# Patient Record
Sex: FEMALE | Race: WHITE | NOT HISPANIC OR LATINO | Employment: FULL TIME | URBAN - METROPOLITAN AREA
[De-identification: names, ages, dates, MRNs, and addresses within clinical notes are randomized per-mention and may not be internally consistent; named-entity substitution may affect disease eponyms.]

---

## 2017-02-27 ENCOUNTER — ALLSCRIPTS OFFICE VISIT (OUTPATIENT)
Dept: OTHER | Facility: OTHER | Age: 52
End: 2017-02-27

## 2017-02-27 DIAGNOSIS — E78.00 PURE HYPERCHOLESTEROLEMIA: ICD-10-CM

## 2017-02-27 DIAGNOSIS — Z00.00 ENCOUNTER FOR GENERAL ADULT MEDICAL EXAMINATION WITHOUT ABNORMAL FINDINGS: ICD-10-CM

## 2017-04-06 ENCOUNTER — ALLSCRIPTS OFFICE VISIT (OUTPATIENT)
Dept: OTHER | Facility: OTHER | Age: 52
End: 2017-04-06

## 2017-04-06 DIAGNOSIS — Z84.89 FAMILY HISTORY OF OTHER SPECIFIED CONDITIONS: ICD-10-CM

## 2017-04-06 DIAGNOSIS — R53.83 OTHER FATIGUE: ICD-10-CM

## 2017-04-06 DIAGNOSIS — E78.00 PURE HYPERCHOLESTEROLEMIA: ICD-10-CM

## 2017-04-06 DIAGNOSIS — R14.0 ABDOMINAL DISTENSION (GASEOUS): ICD-10-CM

## 2017-04-14 ENCOUNTER — LAB (OUTPATIENT)
Dept: LAB | Facility: CLINIC | Age: 52
End: 2017-04-14
Payer: COMMERCIAL

## 2017-04-14 DIAGNOSIS — R53.83 OTHER FATIGUE: ICD-10-CM

## 2017-04-14 DIAGNOSIS — Z84.89 FAMILY HISTORY OF OTHER SPECIFIED CONDITIONS: ICD-10-CM

## 2017-04-14 DIAGNOSIS — E78.00 PURE HYPERCHOLESTEROLEMIA: ICD-10-CM

## 2017-04-14 LAB
ALBUMIN SERPL BCP-MCNC: 3.5 G/DL (ref 3.5–5)
ALP SERPL-CCNC: 63 U/L (ref 46–116)
ALT SERPL W P-5'-P-CCNC: 21 U/L (ref 12–78)
ANION GAP SERPL CALCULATED.3IONS-SCNC: 4 MMOL/L (ref 4–13)
AST SERPL W P-5'-P-CCNC: 14 U/L (ref 5–45)
BILIRUB SERPL-MCNC: 0.44 MG/DL (ref 0.2–1)
BUN SERPL-MCNC: 13 MG/DL (ref 5–25)
CALCIUM SERPL-MCNC: 8.4 MG/DL (ref 8.3–10.1)
CHLORIDE SERPL-SCNC: 105 MMOL/L (ref 100–108)
CHOLEST SERPL-MCNC: 229 MG/DL (ref 50–200)
CO2 SERPL-SCNC: 27 MMOL/L (ref 21–32)
CREAT SERPL-MCNC: 0.85 MG/DL (ref 0.6–1.3)
CRP SERPL QL: <3 MG/L
GFR SERPL CREATININE-BSD FRML MDRD: >60 ML/MIN/1.73SQ M
GLUCOSE P FAST SERPL-MCNC: 89 MG/DL (ref 65–99)
HDLC SERPL-MCNC: 82 MG/DL (ref 40–60)
LDLC SERPL CALC-MCNC: 138 MG/DL (ref 0–100)
POTASSIUM SERPL-SCNC: 4.4 MMOL/L (ref 3.5–5.3)
PROT SERPL-MCNC: 6.8 G/DL (ref 6.4–8.2)
SODIUM SERPL-SCNC: 136 MMOL/L (ref 136–145)
TRIGL SERPL-MCNC: 45 MG/DL
TSH SERPL DL<=0.05 MIU/L-ACNC: 3.2 UIU/ML (ref 0.36–3.74)

## 2017-04-14 PROCEDURE — 83516 IMMUNOASSAY NONANTIBODY: CPT

## 2017-04-14 PROCEDURE — 80061 LIPID PANEL: CPT

## 2017-04-14 PROCEDURE — 36415 COLL VENOUS BLD VENIPUNCTURE: CPT

## 2017-04-14 PROCEDURE — 84443 ASSAY THYROID STIM HORMONE: CPT

## 2017-04-14 PROCEDURE — 80053 COMPREHEN METABOLIC PANEL: CPT

## 2017-04-14 PROCEDURE — 86140 C-REACTIVE PROTEIN: CPT

## 2017-04-17 LAB
GLIADIN PEPTIDE IGA SER-ACNC: 5 UNITS (ref 0–19)
GLIADIN PEPTIDE IGG SER-ACNC: 2 UNITS (ref 0–19)

## 2017-04-18 ENCOUNTER — GENERIC CONVERSION - ENCOUNTER (OUTPATIENT)
Dept: OTHER | Facility: OTHER | Age: 52
End: 2017-04-18

## 2017-05-01 ENCOUNTER — ALLSCRIPTS OFFICE VISIT (OUTPATIENT)
Dept: OTHER | Facility: OTHER | Age: 52
End: 2017-05-01

## 2017-06-09 DIAGNOSIS — Z12.31 ENCOUNTER FOR SCREENING MAMMOGRAM FOR MALIGNANT NEOPLASM OF BREAST: ICD-10-CM

## 2017-07-17 ENCOUNTER — ALLSCRIPTS OFFICE VISIT (OUTPATIENT)
Dept: OTHER | Facility: OTHER | Age: 52
End: 2017-07-17

## 2017-07-18 ENCOUNTER — ALLSCRIPTS OFFICE VISIT (OUTPATIENT)
Dept: OTHER | Facility: OTHER | Age: 52
End: 2017-07-18

## 2017-07-18 ENCOUNTER — HOSPITAL ENCOUNTER (OUTPATIENT)
Dept: RADIOLOGY | Age: 52
Discharge: HOME | End: 2017-07-18
Payer: COMMERCIAL

## 2017-07-18 DIAGNOSIS — R14.0 ABDOMINAL DISTENSION (GASEOUS): ICD-10-CM

## 2017-07-18 PROCEDURE — 76705 ECHO EXAM OF ABDOMEN: CPT

## 2017-07-21 ENCOUNTER — GENERIC CONVERSION - ENCOUNTER (OUTPATIENT)
Dept: OTHER | Facility: OTHER | Age: 52
End: 2017-07-21

## 2017-08-14 DIAGNOSIS — N83.209 CYST OF OVARY: ICD-10-CM

## 2017-08-16 ENCOUNTER — HOSPITAL ENCOUNTER (OUTPATIENT)
Dept: RADIOLOGY | Age: 52
Discharge: HOME | End: 2017-08-16
Payer: COMMERCIAL

## 2017-08-16 DIAGNOSIS — N83.209 CYST OF OVARY: ICD-10-CM

## 2017-08-16 PROCEDURE — 76856 US EXAM PELVIC COMPLETE: CPT

## 2017-08-16 PROCEDURE — 76830 TRANSVAGINAL US NON-OB: CPT

## 2017-08-17 ENCOUNTER — GENERIC CONVERSION - ENCOUNTER (OUTPATIENT)
Dept: OTHER | Facility: OTHER | Age: 52
End: 2017-08-17

## 2017-09-05 ENCOUNTER — ALLSCRIPTS OFFICE VISIT (OUTPATIENT)
Dept: OTHER | Facility: OTHER | Age: 52
End: 2017-09-05

## 2017-09-08 ENCOUNTER — LAB CONVERSION - ENCOUNTER (OUTPATIENT)
Dept: OTHER | Facility: OTHER | Age: 52
End: 2017-09-08

## 2017-09-08 LAB
ADDITIONAL INFORMATION (HISTORICAL): NORMAL
ADEQUACY: (HISTORICAL): NORMAL
COMMENT (HISTORICAL): NORMAL
CYTOTECHNOLOGIST: (HISTORICAL): NORMAL
HPV MRNA E6/E7 (HISTORICAL): NOT DETECTED
INTERPRETATION (HISTORICAL): NORMAL
LMP (HISTORICAL): NORMAL
PREV. BX: (HISTORICAL): NORMAL
PREV. PAP (HISTORICAL): NORMAL
SOURCE (HISTORICAL): NORMAL

## 2017-09-14 ENCOUNTER — TRANSCRIBE ORDERS (OUTPATIENT)
Dept: ADMINISTRATIVE | Facility: HOSPITAL | Age: 52
End: 2017-09-14

## 2017-09-14 DIAGNOSIS — Z12.31 ENCOUNTER FOR MAMMOGRAM TO ESTABLISH BASELINE MAMMOGRAM: Primary | ICD-10-CM

## 2017-09-15 DIAGNOSIS — Z12.31 ENCOUNTER FOR SCREENING MAMMOGRAM FOR MALIGNANT NEOPLASM OF BREAST: ICD-10-CM

## 2017-09-20 ENCOUNTER — HOSPITAL ENCOUNTER (OUTPATIENT)
Dept: RADIOLOGY | Age: 52
Discharge: HOME | End: 2017-09-20
Payer: COMMERCIAL

## 2017-09-20 DIAGNOSIS — Z12.31 ENCOUNTER FOR SCREENING MAMMOGRAM FOR MALIGNANT NEOPLASM OF BREAST: ICD-10-CM

## 2017-09-20 PROCEDURE — 77063 BREAST TOMOSYNTHESIS BI: CPT

## 2017-09-20 PROCEDURE — G0202 SCR MAMMO BI INCL CAD: HCPCS

## 2017-10-25 NOTE — PROGRESS NOTES
Assessment  1  Irregular menses (626 4) (N92 6)    Discussion/Summary    #1  Pap smear done with co-testing as well as annual visitEncouraged self breast examination as well as calcium supplementationRecommend annual mammogramDiscussed treatment options for irregular menses including low-dose OCPs, progesterone cycling versus surgery including hysterectomy  Risks and benefits reviewed  All questions answered  She will notify me of her decisionRecommend operation report 2012Return to office in one year  The patient has the current Goals: Goals met  The patent has the current Barriers: No barriers  Patient is able to Self-Care  Self Referrals: Yes      Chief Complaint  yearly      History of Present Illness  HPI: This is a 36-year-old female  3 para 3 who presents for her annual GYN exam  She states in the course of the year her cycles have become more irregular  Beginning of the year she skipped 2 consecutive months  By July and August she began bleeding and spotting intermittently throughout the month  She gets hot flashes and night sweats  She states her mother went through menopause at age 46 area she did have an endometrial ablation done in  followed by a laparoscopic right salpingo-oophorectomy in  for endometrioma  She is up-to-date with her screening mammogram  She did have a colonoscopy in 2012 which had revealed diverticulitis  She is sexually active and has been in a monogamous relationship for 28 years  did have a pelvic ultrasound done through her primary care physician last month which had revealed a normal endometrial stripe with normal left ovary  Review of Systems    Cardiovascular: no complaints of slow or fast heart rate, no chest pain, no palpitations, no leg claudication or lower extremity edema  Respiratory: no complaints of shortness of breath, no wheezing, no dyspnea on exertion, no orthopnea or PND     Breasts: no complaints of breast pain, breast lump or nipple discharge  Gastrointestinal: no complaints of abdominal pain, no constipation, no nausea or diarrhea, no vomiting, no bloody stools  Genitourinary: as noted in HPI  Over the past 2 weeks, how often have you been bothered by the following problems? 1 ) Little interest or pleasure in doing things? Not at all    2 ) Feeling down, depressed or hopeless? Several days  3 ) Trouble falling asleep or sleeping too much? Not at all    4 ) Feeling tired or having little energy? Not at all    5 ) Poor appetite or overeating? Not at all    6 ) Feeling bad about yourself, or that you are a failure, or have let yourself or your family down? Not at all    7 ) Trouble concentrating on things, such as reading a newspaper or watching television? Not at all    8 ) Moving or speaking so slowly that other people could have noticed, or the opposite, moving or speaking faster than usual? Not at all  How difficult have these problems made it for you to do your work, take care of things at home, or get along with people? Not at all  Score 1      OB History  Pregnancy History (Brief):   Prior pregnancies: : 3  Para:   Delivery type: 3 vaginal       Active Problems  1  Abnormal blood chemistry (790 6) (R79 9)   2  Acute pharyngitis (462) (J02 9)   3  Acute URI (465 9) (J06 9)   4  Bloating (787 3) (R14 0)   5  Corneal abrasion (918 1) (S05 00XA)   6  Cyst of uterus (621 8) (N85 8)   7  Diverticulitis (562 11) (K57 92)   8  Encounter for annual routine gynecological examination (V72 31) (Z01 419)   9  Encounter for routine gynecological examination (V72 31) (Z01 419)   10  Encounter for screening mammogram for breast cancer (V76 12) (Z12 31)   11  Fatigue (780 79) (R53 83)   12  History of family problem (V61 8) (Z84 89)   13  Hypercholesteremia (272 0) (E78 00)   14  IBS (irritable bowel syndrome) (564 1) (K58 9)   15  Infected cyst of skin (706 2) (L72 9,L08 9)   16   Menorrhagia with irregular cycle (626 2) (N92 1) 17  Ovarian cyst (620 2) (N83 209)   18  Palpitations (785 1) (R00 2)   19  Pap smear for cervical cancer screening (V76 2) (Z12 4)   20  PSVT (paroxysmal supraventricular tachycardia) (427 0) (I47 1)   21  Screening for breast cancer (V76 10) (Z12 31)   22  Seasonal allergies (477 9) (J30 2)   23  Stye (373 11) (H00 019)   24  Tachycardia (785 0) (R00 0)   25  Viral stomatitis (528 00,079 99) (K12 1,B97 89)    Past Medical History   · History of Diverticulosis (562 10) (K57 90)   · History of  3   · History of migraine (V12 49) (Z86 69)   · History of paroxysmal supraventricular tachycardia (V12 59) (Z86 79)   · Personal history of endometriosis (V13 29) (Z87 42)   · History of Raynauds disease (443 0) (I73 00)    The active problems and past medical history were reviewed and updated today  Surgical History   · History of Biopsy Breast Percutaneous Needle Core   · History of Dilation And Curettage   · History of Hysteroscopy With Endometrial Ablation   · History of Oophorectomy - Unilateral (Removal Of One Ovary)    The surgical history was reviewed and updated today  Family History  Mother    · Family history of dementia (V17 2) (Z81 8)   · Family history of malignant neoplasm of breast (V16 3) (Z80 3)  Father    · Family history of atrial fibrillation (V17 49) (Z82 49)   · Family history of hypertension (V17 49) (Z82 49)    The family history was reviewed and updated today  Social History   · Coffee   · Employed   · Former smoker (T99 60) (J69 887)   ·    · Occupation   · Social alcohol use (Z78 9)   · Tea  The social history was reviewed and updated today  Current Meds   1  Calcium TABS; Take 1 tablet daily; Therapy: (Recorded:01Jcm0995) to Recorded   2  DilTIAZem HCl  MG Oral Capsule Extended Release 24 Hour; TAKE 1 CAPSULE   Daily; Therapy: 74GUT3804 to (Olievr Alarcon)  Requested for: 20Xkc2974; Last   Rx:91Sab8681 Ordered   3   Fish Oil CAPS; take 1 capsule 4 times daily; Therapy: (Recorded:01Feb2016) to Recorded   4  Hyoscyamine Sulfate ER 0 375 MG Oral Tablet Extended Release 12 Hour; TAKE 1   TABLET EVERY 12 HOURS AS NEEDED; Therapy: 90CVA0318 to (Evaluate:00Zsx3770)  Requested for: 31JVU4782; Last   Rx:97Esc4641 Ordered   5  L-Lysine TABS; Therapy: (Recorded:01Feb2016) to Recorded   6  Multiple Vitamins Oral Tablet Recorded   7  Red Yeast Rice CAPS; bid; Therapy: (Recorded:01Feb2016) to Recorded   8  ValACYclovir HCl - 1 GM Oral Tablet; Take 1 tablet 3 times daily as needed; Therapy: 03AMZ9534 to (Evaluate:43Bfb7725)  Requested for: 10MWO2510 Recorded    Allergies  1  No Known Drug Allergies    Vitals   Recorded: 35JYD3452 54:85BA   Systolic 468   Diastolic 72   Height 5 ft 5 in   Weight 183 lb    BMI Calculated 30 45   BSA Calculated 1 9   LMP 92Kor1043     Physical Exam    Constitutional   General appearance: No acute distress, well appearing and well nourished  Pulmonary   Auscultation of lungs: Clear to auscultation  Cardiovascular   Auscultation of heart: Normal rate and rhythm, normal S1 and S2, no murmurs  Genitourinary   External genitalia: Normal and no lesions appreciated  Vagina: Normal, no lesions or dryness appreciated  Urethral meatus: Normal     Cervix: Normal, no palpable masses  Uterus: Normal, non-tender, not enlarged, and no palpable masses  -- mobile  Adnexa/parametria: Normal, non-tender and no fullness or masses appreciated  Anus, perineum, and rectum: Normal sphincter tone, no masses, and no prolapse  Chest   Breasts: Normal and no dimpling or skin changes noted  Abdomen   Abdomen: Normal, non-tender, and no organomegaly noted         Signatures   Electronically signed by : Andrea Burgos DO; Sep  5 2017  2:17PM EST                       (Author)

## 2017-11-29 DIAGNOSIS — R00.2 PALPITATIONS: ICD-10-CM

## 2017-11-29 DIAGNOSIS — I47.1 SUPRAVENTRICULAR TACHYCARDIA (HCC): ICD-10-CM

## 2017-12-01 ENCOUNTER — HOSPITAL ENCOUNTER (OUTPATIENT)
Dept: NON INVASIVE DIAGNOSTICS | Facility: HOSPITAL | Age: 52
Discharge: HOME | End: 2017-12-01
Payer: COMMERCIAL

## 2017-12-01 ENCOUNTER — GENERIC CONVERSION - ENCOUNTER (OUTPATIENT)
Dept: OTHER | Facility: OTHER | Age: 52
End: 2017-12-01

## 2017-12-01 DIAGNOSIS — I47.1 SUPRAVENTRICULAR TACHYCARDIA (HCC): ICD-10-CM

## 2017-12-01 DIAGNOSIS — R00.2 PALPITATIONS: ICD-10-CM

## 2017-12-01 PROCEDURE — 93226 XTRNL ECG REC<48 HR SCAN A/R: CPT

## 2017-12-01 PROCEDURE — 93225 XTRNL ECG REC<48 HRS REC: CPT

## 2018-01-09 NOTE — PROGRESS NOTES
Assessment  Assessed    1  PSVT (paroxysmal supraventricular tachycardia) (427 0) (I47 1)   2  Palpitations (785 1) (R00 2)   3  Hypercholesteremia (272 0) (E78 0)    Plan  Hypercholesteremia    · Follow-up visit in 1 year Evaluation and Treatment  Follow-up  Status: Hold For -  Scheduling  Requested for: 97HZT3211   Ordered; For: Hypercholesteremia; Ordered By: Damion Winn Performed:  Due: 84DYY3230  PSVT (paroxysmal supraventricular tachycardia)    · Diltiazem HCl  MG Oral Capsule Extended Release 24 Hour; TAKE 1  CAPSULE Daily   Rx By: Damion Winn; Dispense: 90 Days ; #:90 Capsule Extended Release 24 Hour; Refill: 3; For: PSVT (paroxysmal supraventricular tachycardia); AFSANEH = N; Sent To: [facility]    Discussion/Summary  Discussion Summary:   1  Paroxysmal SVT  Echo 7/2/14 showed normal LV size and function, EF 60%, no RWMA, normal diastolic function, normal RV size and function, no significant valvular heart disease  She had 30 day event monitor from 6/23-7/22/14, which showed sinus rhythm  When patient felt fluttering, EKG showed sinus rhythm with PACs  She did have an episode of SVT on 7/14/14 while exercising, maximum  bpm  She is on Diltiazem now and feels palpitations are well controlled  HR is bradycardic, but she denies any dizziness, lightheadedness  2  Dyslipidemia  Lipid panel 5/27/14 showed total cholesterol 228, , TG 78, HDL 62  She is currently taking fish oil, which was started after the lipid panel results  She is supposed to go for lipid panel this year  Chief Complaint  Chief Complaint Free Text Note Form: 12 mth f/u      History of Present Illness  HPI: 53year old woman with a reported history of paroxysmal SVT, who is here for follow up  She states she was diagnosed with paroxysmal SVT approximately 17 years ago, which was diagnosed with event monitor, which was done for palpitations  She is not on any medications currently   Normally the episodes convert on their own within minutes, but in 2014 she had two episodes that lasted longer than 10 minutes  She saw Dr Netta Vaughn, who ordered a Holter monitor  Holter monitor 5/19/14 showed sinus rhythm, avg HR 71 bpm, minimum HR 41 bpm, maximum  bpm  There were isolated PVCs and PACs, rare atrial couplets, but no supraventricular tachycardia  When the patient reported possible starting of a tachycardia, Holter showed sinus tachycardia,  bpm, which occurred during exercise, with an atrial couplet  She has tried bearing down without success  In general she tells me she gets palpitations several times a week, but most of them are short lived  She has only had two that are persisting greater than 10 minutes  She has only had dizziness once with it, when the tachycardia lasted longer than usual  The two episodes of longer episodes of palpitations occurred while she was having her menses  Echo 7/2/14 showed normal LV size and function, EF 60%, no RWMA, normal diastolic function, normal RV size and function, no significant valvular heart disease  She had 30 day event monitor from 6/23-7/22/14, which showed sinus rhythm  When patient felt fluttering, EKG showed sinus rhythm with PACs  She did have an episode of SVT on 7/14/14 while exercising, maximum  bpm      I had started her on Diltiazem, and she noted reduction in palpitations, less than once a month  Currently states that she has not had any significant palpitations for the last few months  She normally exercises via step classes and spin classes, denies any significant chest pain or shortness of breath with exertion  She gets mild ankle edema, worse when she stands for prolonged periods  No orthopnea, uses 1 pillow to sleep, no orthopnea  She does have a family history of atrial fibrillation in her father and uncle  She has been tried on beta blockers in the past, but developed Raynaud's phenomenon and was unable to tolerate it   No dizziness or lightheadedness  Review of Systems  Cardiology Female ROS:     Cardiac: rhythm problems and palpitations present , but no chest pain, no fainting/blackouts, no heart murmur present and no signs of swelling  Skin: No complaints of nonhealing sores or skin rash  Genitourinary: No complaints of recurrent urinary tract infections, frequent urination at night, difficult urination, blood in urine, kidney stones, loss of bladder control, kidney problems, denies any birth control or hormone replacement, is not post menopausal, not currently pregnant  Psychological: No complaints of feeling depressed, anxiety, panic attacks, or difficulty concentrating  General: No complaints of trouble sleeping, lack of energy, fatigue, appetite changes, weight changes, fever, frequent infections, or night sweats  Respiratory: No complaints of shortness of breath, cough with sputum, or wheezing  HEENT: No complaints of serious problems, hearing problems, nose problems, throat problems, or snoring  Gastrointestinal: No complaints of liver problems, nausea, vomiting, heartburn, constipation, bloody stools, diarrhea, problems swallowing, adbominal pain, or rectal bleeding  Hematologic: No complaints of bleeding disorders, anemia, blood clots, or excessive brusing  Neurological: No complaints of numbness, tingling, dizziness, weakness, seizures, headaches, syncope or fainting, AM fatigue, daytime sleepiness, no witnessed apnea episodes  Musculoskeletal: No complaints of arthritis, back pain, or painfull swelling  ROS Reviewed:   ROS reviewed  Active Problems  Problems    1  Abnormal blood chemistry (790 6) (R79 9)   2  Acute pharyngitis (462) (J02 9)   3  Acute URI (465 9) (J06 9)   4  Corneal abrasion (918 1) (S05 00XA)   5  Hypercholesteremia (272 0) (E78 0)   6  Infected cyst of skin (706 2) (L72 9,L08 9)   7  Palpitations (785 1) (R00 2)   8   PSVT (paroxysmal supraventricular tachycardia) (427 0) (I47 1) 9  Stye (373 11) (H00 019)   10  Viral stomatitis (528 00,079 99) (K12 1,B97 89)    Past Medical History  Problems    1  History of Diverticulosis (562 10) (K57 90)   2  History of migraine (V12 49) (Z86 69)   3  History of paroxysmal supraventricular tachycardia (V12 59) (Z86 79)   4  Personal history of endometriosis (V13 29) (Z87 42)   5  History of Raynauds disease (443 0) (I73 00)  Active Problems And Past Medical History Reviewed: The active problems and past medical history were reviewed and updated today  Surgical History  Problems    1  History of Oophorectomy - Unilateral (Removal Of One Ovary)  Surgical History Reviewed: The surgical history was reviewed and updated today  Family History  Mother    1  Family history of dementia (V17 2) (Z81 8)   2  Family history of malignant neoplasm of breast (V16 3) (Z80 3)  Father    3  Family history of atrial fibrillation (V17 49) (Z82 49)   4  Family history of hypertension (V17 49) (Z82 49)  Family History Reviewed: The family history was reviewed and updated today  Social History  Problems    · Coffee   · Never a smoker   · Social alcohol use (F10 99)   · Tea  Social History Reviewed: The social history was reviewed and updated today  Current Meds   1  Calcium TABS; Take 1 tablet daily; Therapy: (Recorded:01Feb2016) to Recorded   2  Diltiazem HCl  MG Oral Capsule Extended Release 24 Hour; TAKE 1 CAPSULE   Daily; Therapy: 71FEG2691 to (Evaluate:82Duf6963)  Requested for: 51HNZ0257; Last   Rx:13Rmd0036 Ordered   3  Fish Oil CAPS; take 1 capsule 4 times daily; Therapy: (Recorded:01Feb2016) to Recorded   4  L-Lysine TABS; Therapy: (Recorded:01Feb2016) to Recorded   5  Promethazine-DM 6 25-15 MG/5ML Oral Syrup; Take 1 teaspoonful every 6 hours as   needed; Therapy: 72YVN3265 to (Last Rx:25Jan2016)  Requested for: 88BLN5920 Ordered   6  Red Yeast Rice CAPS; bid; Therapy: (Recorded:01Feb2016) to Recorded   7   ValACYclovir HCl - 1 GM Oral Tablet; Take 1 tablet 3 times daily as needed; Therapy: 45ANH4743 to (Evaluate:31Bkk8079)  Requested for: 43FFK4582 Recorded  Medication List Reviewed: The medication list was reviewed and updated today  Allergies  Medication    1  No Known Drug Allergies    Vitals  Vital Signs [Data Includes: Current Encounter]    Recorded: 33VKH5060 03:08PM   Heart Rate 50   Systolic 789, RUE, Sitting   Diastolic 60, RUE, Sitting   BP Cuff Size Large   Height 5 ft 10 in   Weight 199 lb 1 oz   BMI Calculated 28 56   BSA Calculated 2 08     Physical Exam    Constitutional   General appearance: No acute distress, well appearing and well nourished  Eyes   Conjunctiva and Sclera examination: Conjunctiva pink, sclera anicteric  Ears, Nose, Mouth, and Throat - Oropharynx: Clear, nares are clear, mucous membranes are moist    Neck   Neck and thyroid: Normal, supple, trachea midline, no thyromegaly  Pulmonary   Respiratory effort: No increased work of breathing or signs of respiratory distress  Auscultation of lungs: Clear to auscultation, no rales, no rhonchi, no wheezing, good air movement  Cardiovascular   Palpation of heart: Normal PMI, no thrills  Auscultation of heart: Abnormal   Bradycardic, regular, no m/g/r  Carotid pulses: Normal, 2+ bilaterally  Examination of extremities for edema and/or varicosities: Abnormal   Trace ankle edema, skin WWP  Chest - Chest: Normal    Abdomen   Abdomen: Non-tender and no distention  Musculoskeletal Gait and station: Normal gait  Skin - Skin and subcutaneous tissue: Normal without rashes or lesions  Skin is warm and well perfused, normal turgor  Neurologic - Speech: Normal     Psychiatric - Orientation to person, place, and time: Normal  Mood and affect: Normal       Signatures   Electronically signed by :  Francisca Jacobo MD; Feb 1 2016  3:29PM EST                       (Author)

## 2018-01-11 NOTE — RESULT NOTES
Verified Results  (1) GLIADIN AB IgG/IgA ADULT 14Apr2017 07:16AM [facility]     Test Name Result Flag Reference   GLIADA 5 units  0 - 19   Negative                   0 - 19                     Weak Positive             20 - 30                     Moderate to Strong Positive   >30  Performed at:  [address]  [id number]  : Juliann Raman MD, Phone:  [phone]   GLIADG 2 units  0 - 19   Negative                   0 - 19                     Weak Positive             20 - 30                     Moderate to Strong Positive   >30

## 2018-01-13 VITALS
HEART RATE: 60 BPM | BODY MASS INDEX: 27.83 KG/M2 | RESPIRATION RATE: 16 BRPM | HEIGHT: 70 IN | DIASTOLIC BLOOD PRESSURE: 78 MMHG | TEMPERATURE: 95.1 F | SYSTOLIC BLOOD PRESSURE: 106 MMHG | WEIGHT: 194.38 LBS

## 2018-01-13 VITALS
HEIGHT: 65 IN | WEIGHT: 183 LBS | BODY MASS INDEX: 30.49 KG/M2 | DIASTOLIC BLOOD PRESSURE: 72 MMHG | SYSTOLIC BLOOD PRESSURE: 114 MMHG

## 2018-01-13 NOTE — RESULT NOTES
Verified Results  * [facility] [id number] 07:30AM Daisy Arm Order Number: [id number]    - Patient Instructions: To schedule this appointment, please contact Central Scheduling at [phone]  Test Name Result Flag Reference   US PELVIS COMPLETE (TRANSABDOMINAL AND TRANSVAGINAL) (Report)     PELVIC ULTRASOUND, COMPLETE     INDICATION: Follow-up ovarian cyst           COMPARISON: 6/8/2016     TECHNIQUE:  Transabdominal pelvic ultrasound was performed in sagittal and transverse planes with a curvilinear transducer  Additional transvaginal imaging was performed to better evaluate the endometrium and ovaries  Imaging included volumetric    sweeps as well as traditional still imaging technique  FINDINGS:     UTERUS:   The uterus is anteverted in position, measuring 9 3 x 4 4 x 5 3 cm  Contour and echotexture appear normal      The cervix shows no suspicious abnormality  ENDOMETRIUM:    Normal caliber of 10 mm  Homogenous and normal in appearance  OVARIES/ADNEXA:   Right ovary: Surgically absent  Left ovary: 3 6 x 2 8 x 1 9 cm  No suspicious left ovarian abnormality  Dominant simple benign appearing follicle measuring 1 9 cm noted  Doppler flow within normal limits  No suspicious adnexal mass or loculated collections  There is no free fluid  IMPRESSION:      Normal  Status post right oophorectomy            Workstation performed: [id number]     Signed by:   Elena Samano MD   8/17/17

## 2018-01-14 VITALS
HEIGHT: 70 IN | BODY MASS INDEX: 27.82 KG/M2 | SYSTOLIC BLOOD PRESSURE: 112 MMHG | DIASTOLIC BLOOD PRESSURE: 78 MMHG | HEART RATE: 50 BPM | WEIGHT: 194.31 LBS

## 2018-01-14 VITALS
SYSTOLIC BLOOD PRESSURE: 112 MMHG | TEMPERATURE: 99.1 F | HEART RATE: 64 BPM | HEIGHT: 65 IN | DIASTOLIC BLOOD PRESSURE: 74 MMHG | WEIGHT: 183.25 LBS | BODY MASS INDEX: 30.53 KG/M2

## 2018-01-14 NOTE — RESULT NOTES
Verified Results  Katerine Holliday 1154 11:15AM Nikita Jett Order Number: [id number]    - Patient Instructions: To schedule this appointment, please contact Central Scheduling at [phone]  Test Name Result Flag Reference   US ABDOMEN LIMITED (Report)     RIGHT UPPER QUADRANT ULTRASOUND     INDICATION: Intermittent abdominal pain     COMPARISON: None  TECHNIQUE:  Real-time ultrasound of the right upper quadrant was performed with a curvilinear transducer with both volumetric sweeps and still imaging techniques  FINDINGS:     PANCREAS: Visualized portions of the pancreas are within normal limits  AORTA AND IVC: Visualized portions are normal for patient age  LIVER:   Size: Within normal range  The liver measures 14 7 cm in the midclavicular line  Contour: Surface contour is smooth  Parenchyma: Echogenicity and echotexture are within normal limits  No evidence of suspicious mass  Limited imaging of the main portal vein shows it to be patent and hepatopetal       BILIARY:   The gallbladder is normal in caliber  No wall thickening or pericholecystic fluid  No stones or sludge identified  No sonographic Tariq's sign  No intrahepatic biliary dilatation  CBD measures 3 mm  No choledocholithiasis  KIDNEY:    Right kidney measures 10 2 x 4 2 cm  Within normal limits  ASCITES:  None         IMPRESSION:   Unremarkable exam        Workstation performed: [id number]     Signed by:   Wilner Moses MD   7/21/17

## 2018-01-15 VITALS
HEIGHT: 65 IN | DIASTOLIC BLOOD PRESSURE: 70 MMHG | WEIGHT: 183 LBS | BODY MASS INDEX: 30.49 KG/M2 | SYSTOLIC BLOOD PRESSURE: 100 MMHG

## 2018-01-16 NOTE — PROGRESS NOTES
Assessment    1  History of family problem (V61 8) (Z84 89)   2  Fatigue (780 79) (R53 83)   3  Bloating (787 3) (R14 0)   4  Encounter for preventive health examination (V70 0) (Z00 00)    Plan  Bloating    · (LC) Antigliadin Abs, IgA; Status:Active; Requested for:06Apr2017;    · [facility]; Status:Active; Requested for:06Apr2017;   Fatigue    · (1) COMPREHENSIVE METABOLIC PANEL; Status:Active; Requested for:06Apr2017;    · (1) TSH; Status:Active; Requested for:06Apr2017; History of family problem    · (1) C-REACTIVE PROTEIN; Status:Active; Requested for:06Apr2017;   Hypercholesteremia    · (1) LIPID PANEL FASTING W DIRECT LDL REFLEX; Status:Active; Requested  for:06Apr2017;     Discussion/Summary  Currently, she eats a healthy diet  cervical cancer screening is current Breast cancer screening: mammogram is current  Colorectal cancer screening: colorectal cancer screening is current  The immunizations are up to date  Await pending lab work and will treat accordingly pending results  Possible side effects of new medications were reviewed with the patient/guardian today  The treatment plan was reviewed with the patient/guardian  The patient/guardian understands and agrees with the treatment plan      Chief Complaint  WELLNESS PE      History of Present Illness  , Adult Female: The patient is being seen for a health maintenance evaluation  General Health: The patient's health since the last visit is described as good  She has regular dental visits  She denies vision problems  She denies hearing loss  Immunizations status: up to date  Lifestyle:  She consumes a diverse and healthy diet  She has weight concerns  She exercises regularly  She uses tobacco  She consumes alcohol  She uses illicit drugs  Reproductive health: the patient is premenopausal    Screening: cancer screening reviewed and current  metabolic screening reviewed and current  risk screening reviewed and current     HPI: The patient is here for a checkup also complains of fatigue and bloating as well  Review of Systems    Constitutional: feeling tired  Gastrointestinal: Positive bloating  Active Problems    1  Abnormal blood chemistry (790 6) (R79 9)   2  Acute pharyngitis (462) (J02 9)   3  Acute URI (465 9) (J06 9)   4  Corneal abrasion (918 1) (S05 00XA)   5  Diverticulitis (562 11) (K57 92)   6  Encounter for routine gynecological examination (V72 31) (Z01 419)   7  Hypercholesteremia (272 0) (E78 00)   8  Infected cyst of skin (706 2) (L72 9,L08 9)   9  Menorrhagia with irregular cycle (626 2) (N92 1)   10  Palpitations (785 1) (R00 2)   11  Pap smear for cervical cancer screening (V76 2) (Z12 4)   12  PSVT (paroxysmal supraventricular tachycardia) (427 0) (I47 1)   13  Screening for breast cancer (V76 10) (Z12 39)   14  Seasonal allergies (477 9) (J30 2)   15  Stye (373 11) (H00 019)   16  Tachycardia (785 0) (R00 0)   17   Viral stomatitis (528 00,079 99) (K12 1,B97 89)    Past Medical History    · History of Diverticulosis (562 10) (K57 90)   · History of  3   · History of migraine (V12 49) (Z86 69)   · History of paroxysmal supraventricular tachycardia (V12 59) (Z86 79)   · Personal history of endometriosis (V13 29) (Z87 42)   · History of Raynauds disease (443 0) (I73 00)    Surgical History    · History of Biopsy Breast Percutaneous Needle Core   · History of Dilation And Curettage   · History of Hysteroscopy With Endometrial Ablation   · History of Oophorectomy - Unilateral (Removal Of One Ovary)    Family History  Mother    · Family history of dementia (V17 2) (Z81 8)   · Family history of malignant neoplasm of breast (V16 3) (Z80 3)  Father    · Family history of atrial fibrillation (V17 49) (Z82 49)   · Family history of hypertension (V17 49) (Z82 49)    Social History    · Coffee   · Employed   · Former smoker (T17 26) (A79 831)   ·    · Occupation   · Social alcohol use (Z78 9)   · Tea    Current Meds   1  Calcium TABS; Take 1 tablet daily; Therapy: (Recorded:01Feb2016) to Recorded   2  DilTIAZem HCl  MG Oral Capsule Extended Release 24 Hour; TAKE 1 CAPSULE   Daily; Therapy: 80AAV0389 to (Cleopatra Salazar)  Requested for: 64Fkp3958; Last   Rx:59Qla8067 Ordered   3  Fish Oil CAPS; take 1 capsule 4 times daily; Therapy: (Recorded:01Feb2016) to Recorded   4  L-Lysine TABS; Therapy: (Recorded:01Feb2016) to Recorded   5  Multiple Vitamins Oral Tablet Recorded   6  Red Yeast Rice CAPS; bid; Therapy: (Recorded:01Feb2016) to Recorded   7  ValACYclovir HCl - 1 GM Oral Tablet; Take 1 tablet 3 times daily as needed; Therapy: 74WHE6147 to (Evaluate:23Ctq6952)  Requested for: 78XNF8707 Recorded    Allergies    1  No Known Drug Allergies    Vitals    Physical Exam    Constitutional   General appearance: No acute distress, well appearing and well nourished  Eyes   Conjunctiva and lids: No swelling, erythema or discharge  Pupils and irises: Equal, round, reactive to light  Ophthalmoscopic examination: Normal fundi and optic discs  Ears, Nose, Mouth, and Throat   External inspection of ears and nose: Normal     Otoscopic examination: Tympanic membranes translucent with normal light reflex  Canals patent without erythema  Hearing: Normal     Nasal mucosa, septum, and turbinates: Normal without edema or erythema  Lips, teeth, and gums: Normal, good dentition  Oropharynx: Normal with no erythema, edema, exudate or lesions  Neck   Neck: Supple, symmetric, trachea midline, no masses  Thyroid: Normal, no thyromegaly  Pulmonary   Respiratory effort: No increased work of breathing or signs of respiratory distress  Auscultation of lungs: Clear to auscultation  Cardiovascular   Palpation of heart: Normal PMI, no thrills  Auscultation of heart: Normal rate and rhythm, normal S1 and S2, no murmurs  Carotid pulses: 2+ bilaterally      Pedal pulses: 2+ bilaterally  Peripheral vascular exam: Normal     Examination of extremities for edema and/or varicosities: Normal     Abdomen   Abdomen: Non-tender, no masses  Liver and spleen: No hepatomegaly or splenomegaly  Lymphatic   Palpation of lymph nodes in neck: No lymphadenopathy  Musculoskeletal   Gait and station: Normal     Digits and nails: Normal without clubbing or cyanosis  Skin   Skin and subcutaneous tissue: Normal without rashes or lesions  Palpation of skin and subcutaneous tissue: Normal turgor  Neurologic   Cranial nerves: Cranial nerves II-XII intact  Reflexes: 2+ and symmetric  Sensation: No sensory loss  Psychiatric   Judgment and insight: Normal     Orientation to person, place, and time: Normal     Recent and remote memory: Intact  Mood and affect: Normal        Results/Data  PHQ-2 Adult Depression Screening 06Apr2017 04:04PM User, s     Test Name Result Flag Reference   PHQ-2 Adult Depression Score 0     Over the last two weeks, how often have you been bothered by any of the following problems? Little interest or pleasure in doing things: Not at all - 0  Feeling down, depressed, or hopeless: Not at all - 0   PHQ-2 Adult Depression Screening Negative         Signatures   Electronically signed by : Claudia Valle DO;  Apr 10 2017  5:47AM EST                       (Author)

## 2018-01-17 NOTE — PROGRESS NOTES
Assessment    1  Acute URI (465 9) (J06 9)   2  Viral stomatitis (528 00,079 99) (K12 1,B97 89)   3  Stye (373 11) (H00 019)    Plan  Acute URI    · Azithromycin 250 MG Oral Tablet; TAKE 2 TABLETS ON DAY 1 THEN TAKE 1  TABLET A DAY FOR 4 DAYS   · Promethazine-DM 6 25-15 MG/5ML Oral Syrup; Take 1 teaspoonful every 6 hours  as needed  Stye    · Tobramycin 0 3 % Ophthalmic Solution; INSTILL 1 DROP INTO AFFECTED EYE 4  TIMES A DAY FOR 10 DAYS  Viral stomatitis    · ValACYclovir HCl - 1 GM Oral Tablet; TAKE 1 TABLET 3 TIMES DAILY    Discussion/Summary    If no better in 72 rhs; pt to call  Possible side effects of new medications were reviewed with the patient/guardian today  The treatment plan was reviewed with the patient/guardian  The patient/guardian understands and agrees with the treatment plan      Chief Complaint  cyst on her left eye      History of Present Illness  HPI: The pateint reports one week of recurrent cold sores, dry , nonproductive cough, and stye on bottom of left eye  Review of Systems    Constitutional: No fever, no chills, feels well, no tiredness, no recent weight gain or loss  ENT: stye bottom of left eye; apthus ulcers of external lips, but no ear ache, no loss of hearing, no nosebleeds or nasal discharge, no sore throat or hoarseness  Cardiovascular: no complaints of slow or fast heart rate, no chest pain, no palpitations, no leg claudication or lower extremity edema  Respiratory: cough, PND and +dry cough, but no complaints of shortness of breath, no wheezing, no dyspnea on exertion, no orthopnea or PND  Breasts: no complaints of breast pain, breast lump or nipple discharge  Gastrointestinal: no complaints of abdominal pain, no constipation, no nausea or diarrhea, no vomiting, no bloody stools  Genitourinary: no complaints of dysuria, no incontinence, no pelvic pain, no dysmenorrhea, no vaginal discharge or abnormal vaginal bleeding     Musculoskeletal: no complaints of arthralgia, no myalgia, no joint swelling or stiffness, no limb pain or swelling  Integumentary: no complaints of skin rash or lesion, no itching or dry skin, no skin wounds  Neurological: no complaints of headache, no confusion, no numbness or tingling, no dizziness or fainting  Active Problems    1  Abnormal blood chemistry (790 6) (R79 9)   2  Acute pharyngitis (462) (J02 9)   3  Corneal abrasion (918 1) (S05 00XA)   4  Hypercholesteremia (272 0) (E78 0)   5  Infected cyst of skin (706 2) (L72 9,L08 9)   6  Palpitations (785 1) (R00 2)   7  PSVT (paroxysmal supraventricular tachycardia) (427 0) (I47 1)    Past Medical History    1  History of Diverticulosis (562 10) (K57 90)   2  History of migraine (V12 49) (Z86 69)   3  History of paroxysmal supraventricular tachycardia (V12 59) (Z86 79)   4  Personal history of endometriosis (V13 29) (Z87 42)   5  History of Raynauds disease (443 0) (I73 00)  Active Problems And Past Medical History Reviewed: The active problems and past medical history were reviewed and updated today  Family History    1  Family history of dementia (V17 2) (Z81 8)   2  Family history of malignant neoplasm of breast (V16 3) (Z80 3)    3  Family history of atrial fibrillation (V17 49) (Z82 49)   4  Family history of hypertension (V17 49) (Z82 49)  Family History Reviewed: The family history was reviewed and updated today  Social History    · Coffee   · Never a smoker   · Social alcohol use (F10 99)   · Tea  The social history was reviewed and updated today  The social history was reviewed and is unchanged  Surgical History    1  History of Oophorectomy - Unilateral (Removal Of One Ovary)  Surgical History Reviewed: The surgical history was reviewed and updated today  Current Meds   1  Calcium TABS; TAKE 1 TABLET Other; Therapy: (Recorded:73Tae7801) to Recorded   2   Diltiazem HCl  MG Oral Capsule Extended Release 24 Hour; TAKE 1 CAPSULE   Daily; Therapy: 70HSS9761 to (Evaluate:37Fhq5145)  Requested for: 03RLM3544; Last   Rx:[id number] Ordered   3  Fish Oil CAPS; TAKE 2 CAPSULE Daily; Therapy: (Recorded:14Hgb8289) to Recorded   4  Red Yeast Rice CAPS; Therapy: (Recorded:84Kwm6088) to Recorded    The medication list was reviewed and updated today  Allergies    1  No Known Drug Allergies    Vitals   Recorded: 49OWQ8090 02:51PM   Temperature 99 7 F   Heart Rate 80   Systolic 179   Diastolic 62   Height 5 ft 10 in   Weight 194 lb    BMI Calculated 27 84   BSA Calculated 2 07     Physical Exam    Constitutional   General appearance: No acute distress, well appearing and well nourished  Eyes   Conjunctiva and lids: Abnormal   bottom left conjuctival injection with stye noted  Pupils and irises: Equal, round and reactive to light  Ears, Nose, Mouth, and Throat   External inspection of ears and nose: Normal     Otoscopic examination: Abnormal   tm dull bilat  Nasal mucosa, septum, and turbinates: Abnormal   + turb injection  Oropharynx: Abnormal   + pnd    Pulmonary   Respiratory effort: No increased work of breathing or signs of respiratory distress  Auscultation of lungs: Clear to auscultation  Cardiovascular   Auscultation of heart: Normal rate and rhythm, normal S1 and S2, without murmurs  Examination of extremities for edema and/or varicosities: Normal     Abdomen   Abdomen: Non-tender, no masses  Liver and spleen: No hepatomegaly or splenomegaly  Lymphatic   Palpation of lymph nodes in neck: Abnormal   + ant cervical lymph  Musculoskeletal   Digits and nails: Normal without clubbing or cyanosis  Skin   Skin and subcutaneous tissue: Normal without rashes or lesions  Neurologic   Reflexes: 2+ and symmetric      Psychiatric   Orientation to person, place, and time: Normal     Mood and affect: Normal          Future Appointments    Date/Time Provider Specialty Site   02/01/2016 03:00 PM Francisco Laguna MD Cardiology [facility] [address] [place] [address] [facility]     Signatures   Electronically signed by : Gregorio Nicole DO; Jan 26 2016  6:07AM EST                       (Author)

## 2018-01-22 VITALS
SYSTOLIC BLOOD PRESSURE: 102 MMHG | BODY MASS INDEX: 27.94 KG/M2 | WEIGHT: 195.13 LBS | HEART RATE: 56 BPM | DIASTOLIC BLOOD PRESSURE: 70 MMHG | TEMPERATURE: 98.8 F | HEIGHT: 70 IN

## 2018-01-23 NOTE — RESULT NOTES
Message  Reviewed holter showing SR with PACs causing palpitations  Called and left VM for patient to let her know results, and recommending she can try increasing Diltiazem from 120 to 240 if she would like to help prevent her from feeling PACs as much  Signatures   Electronically signed by :  Ramya Brody MD; Dec  4 2017 10:12AM EST                       (Author)

## 2018-01-30 PROBLEM — E78.5 DYSLIPIDEMIA: Status: ACTIVE | Noted: 2018-01-30

## 2018-01-30 PROBLEM — K58.9 IBS (IRRITABLE BOWEL SYNDROME): Status: ACTIVE | Noted: 2017-07-18

## 2018-01-30 PROBLEM — I49.1 PAC (PREMATURE ATRIAL CONTRACTION): Status: ACTIVE | Noted: 2018-01-30

## 2018-01-30 PROBLEM — N85.8 CYST OF UTERUS: Status: ACTIVE | Noted: 2017-08-14

## 2018-01-30 PROBLEM — N83.209 OVARIAN CYST: Status: ACTIVE | Noted: 2017-08-14

## 2018-01-30 NOTE — PROGRESS NOTES
Cardiology Follow up Note    Aylin Naidu 47 y o  female MRN: [id number]    02/02/18          Assessment/Plan:    1  Paroxysmal SVT/PACs  Echo 7/2/14 showed normal LV size and function, EF 60%, no RWMA, normal diastolic function, normal RV size and function, no significant valvular heart disease  She had 30 day event monitor from 6/23-7/22/14, which showed sinus rhythm  When patient felt fluttering, EKG showed sinus rhythm with PACs  She did have an episode of SVT on 7/14/14 while exercising, maximum  bpm  Diltiazem 120 was started in 2014 with symptomatic improvement  Holter monitor was done 12/17 for increased palpitations, which showed average HR 61 BPM, minimum HR 42 BPM, and maximum  BPM  3412 supraventricular ectopic beats, majority of which were PACs, 11 atrial couplets  Longest R-R interval was 1 7 seconds  Palpitations mostly correlated with PACs  2  Dyslipidemia  Lipid panel 5/27/14 showed total cholesterol 228, , TG 78, HDL 62  She is currently taking fish oil, which was started after the lipid panel results  Lipid panel 4/17 showed total cholesterol 229, , TG 45, HDL 82  She is now also taking red yeast rice  Will order repeat lipid panel for 4/18  3  LE edema  Occurred when she traveled long distances, I told her to call me prior to next travel, can prescribe HCTZ or Lasix prior to next trip  None currently  HPI:     47year old woman with a reported history of paroxysmal SVT, who is here for follow up  She states she was diagnosed with paroxysmal SVT around 2000, which was diagnosed with event monitor, which was done for palpitations  Normally the episodes convert on their own within minutes, but in 2014 she had two episodes that lasted longer than 10 minutes  She has tried bearing down without success  She does have a family history of atrial fibrillation in her father and uncle   She has been tried on beta blockers in the past, but developed Raynaud's phenomenon and was unable to tolerate it  Holter monitor 5/19/14 showed sinus rhythm, avg HR 71 bpm, minimum HR 41 bpm, maximum  bpm  There were isolated PVCs and PACs, rare atrial couplets, but no supraventricular tachycardia  When the patient reported possible starting of a tachycardia, Holter showed sinus tachycardia,  bpm, which occurred during exercise, with an atrial couplet  Echo 7/2/14 showed normal LV size and function, EF 60%, no RWMA, normal diastolic function, normal RV size and function, no significant valvular heart disease  She had 30 day event monitor from 6/23-7/22/14, which showed sinus rhythm  When patient felt fluttering, EKG showed sinus rhythm with PACs  She did have an episode of SVT on 7/14/14 while exercising, maximum  bpm      I had started her on Diltiazem 120 in 2014, and she had noted reduction in palpitations  Holter monitor was done 12/17 for increased palpitations, which showed average HR 61 BPM, minimum HR 42 BPM, and maximum  BPM  3412 supraventricular ectopic beats, majority of which were PACs, 11 atrial couplets  Longest R-R interval was 1 7 seconds  Palpitations mostly correlated with PACs  I had recommended she consider increasing Diltiazem from 120 to 240 if she needed to symptomatically  She reports she never increased the Diltiazem, because by the time Holter results were available the palpitations had improved on their own  She normally exercises via step classes and spin classes, also swimming, denies any significant chest pain or significant shortness of breath with exertion  She gets mild ankle edema, worse when she stands for prolonged periods or when she travels on flights, none recently  No orthopnea, uses 1 pillow to sleep, no PND  No dizziness or lightheadedness          Patient Active Problem List   Diagnosis    Hypercholesteremia    IBS (irritable bowel syndrome)    Menorrhagia with irregular cycle    Diverticulitis  Cyst of uterus    Corneal abrasion    Ovarian cyst    PSVT (paroxysmal supraventricular tachycardia) (HCC)    Seasonal allergies    PAC (premature atrial contraction)    Dyslipidemia       No Known Allergies      Current Outpatient Prescriptions:     Calcium Carb-Cholecalciferol (CALCIUM 1000 + D) 1000-800 MG-UNIT TABS, Take 1 tablet by mouth daily, Disp: , Rfl:     diltiazem (CARDIZEM SR) 120 mg 12 hr capsule, Take 1 capsule by mouth daily, Disp: , Rfl:     L-Lysine 1000 MG TABS, Take by mouth, Disp: , Rfl:     MULTIPLE VITAMINS PO, Take by mouth, Disp: , Rfl:     Omega-3 Fatty Acids (FISH OIL) 645 MG CAPS, Take 1 capsule by mouth 4 (four) times a day, Disp: , Rfl:     Red Yeast Rice 600 MG CAPS, Take by mouth 2 (two) times a day, Disp: , Rfl:     valACYclovir (VALTREX) 1,000 mg tablet, Take 1 tablet by mouth 3 (three) times a day as needed, Disp: , Rfl:     hyoscyamine (LEVBID) 0 375 mg 12 hr tablet, Take 1 tablet by mouth every 12 (twelve) hours as needed, Disp: , Rfl:     Past Medical History:   Diagnosis Date    Hypercholesterolemia     Palpitations     Paroxysmal a-fib ([address] )        Family History   Problem Relation Age of Onset    Cancer Mother     Arrhythmia Father     Hypertension Father        Past Surgical History:   Procedure Laterality Date    BREAST BIOPSY      DILATION AND CURETTAGE OF UTERUS      ENDOMETRIAL ABLATION      OOPHORECTOMY         Social History     Social History    Marital status: /Civil Union     Spouse name: N/A    Number of children: N/A    Years of education: N/A     Occupational History    Not on file       Social History Main Topics    Smoking status: Former Smoker    Smokeless tobacco: Never Used    Alcohol use Yes    Drug use: No    Sexual activity: Not on file     Other Topics Concern    Not on file     Social History Narrative    No narrative on file       Review of Systems   Constitution: Negative for chills, decreased appetite, diaphoresis, fever, weakness, malaise/fatigue, night sweats, weight gain and weight loss  HENT: Negative for ear pain, hearing loss, hoarse voice, nosebleeds, sore throat and tinnitus  Eyes: Negative for blurred vision and pain  Cardiovascular: Negative  Negative for chest pain, claudication, cyanosis, dyspnea on exertion, irregular heartbeat, leg swelling, near-syncope, orthopnea, palpitations, paroxysmal nocturnal dyspnea and syncope  Respiratory: Negative for cough, hemoptysis, shortness of breath, sleep disturbances due to breathing, snoring, sputum production and wheezing  Hematologic/Lymphatic: Negative for adenopathy and bleeding problem  Does not bruise/bleed easily  Skin: Negative for color change, dry skin, flushing, itching, poor wound healing and rash  Musculoskeletal: Negative for arthritis, back pain, falls, joint pain, muscle cramps, muscle weakness, myalgias and neck pain  Gastrointestinal: Negative for abdominal pain, constipation, diarrhea, dysphagia, heartburn, hematemesis, hematochezia, melena, nausea and vomiting  Genitourinary: Negative for dysuria, frequency, hematuria, hesitancy, non-menstrual bleeding and urgency  Neurological: Negative for excessive daytime sleepiness, dizziness, focal weakness, headaches, light-headedness, loss of balance, numbness, paresthesias, tremors and vertigo  Psychiatric/Behavioral: Negative for altered mental status, depression and memory loss  The patient does not have insomnia and is not nervous/anxious  Allergic/Immunologic: Positive for environmental allergies  Negative for persistent infections  Vitals: /62 (BP Location: Right arm, Patient Position: Sitting, Cuff Size: Standard)   Pulse (!) 52   Ht 5' 5" (1 651 m)   Wt 87 4 kg (192 lb 9 6 oz)   BMI 32 05 kg/m²       Physical Exam:     GEN: Alert and oriented x 3, in no acute distress  Well appearing and well nourished     HEENT: Sclera anicteric, conjunctivae pink, mucous membranes moist  Oropharynx clear  NECK: Supple, no carotid bruits, no significant JVD  Trachea midline, no thyromegaly  HEART: Regular rhythm, normal S1 and S2, no murmurs, clicks, gallops or rubs  PMI nondisplaced, no thrills  LUNGS: Clear to auscultation bilaterally; no wheezes, rales, or rhonchi  No increased work of breathing or signs of respiratory distress  ABDOMEN: Soft, nontender, nondistended, normoactive bowel sounds  EXTREMITIES: Skin warm and well perfused, no clubbing, cyanosis, or edema  NEURO: No focal findings  Normal gait  Normal speech  Mood and affect normal    SKIN: Normal without suspicious lesions on exposed skin  Lab Results:       No results found for: HGBA1C  Lab Results   Component Value Date    CHOL 229 (H) 2017     Lab Results   Component Value Date    HDL 82 (H) 2017     Lab Results   Component Value Date    LDLCALC 138 (H) 2017     Lab Results   Component Value Date    TRIG 45 2017     No components found for: [place], [place]      Cardiac testing:   Results for orders placed in visit on 14   Echo complete with contrast if indicated    [facility] [address] [address]   [address] [place] [address]   Phone: [phone]   TRANSTHORACIC ECHOCARDIOGRAM   2D, M-MODE, DOPPLER, AND COLOR DOPPLER   Study date:  2014   Patient: Lennox Cai   MR number: [id number]   Account number: [de-identified]   : 1965   Age: 53 years   Gender: Female   Status: Outpatient   Location: [address] [place] [address] [facility] [place]   Height: 65 in   Weight: 182 lb   BP: 122/ 80 mmHg   Indications: Assess left ventricular function     Diagnoses: 785 0 - TACHYCARDIA NOS   Sonographer:  ARIC Geiger   Primary Physician:  Joe Vega DO   Referring Physician:  Ramya Brody MD   Group:  [facility]   Interpreting Physician:  Reine Hodgkin, MD   SUMMARY   LEFT VENTRICLE:   Size was normal    Systolic function was normal  Ejection fraction was estimated to be 60 %  Wall thickness was normal    Left ventricular diastolic function parameters were normal    RIGHT VENTRICLE:   The size was normal    Systolic function was normal    MITRAL VALVE:   There was trace regurgitation  TRICUSPID VALVE:   There was trace regurgitation  HISTORY: PRIOR HISTORY: Supraventricular arrhythmia  No history of   hypercholesterolemia or hypertension  PROCEDURE: The study was performed in the [address] [facility] [place]  This was a routine study  The transthoracic approach was used  The study   TRANSTHORACIC ECHOCARDIOGRAM   Patient: Jean Carlos Sultana   MR number: [id number]    ------ Page 2   included complete 2D imaging, M-mode, complete spectral Doppler, and color   Doppler  Echocardiographic views were limited due to decreased penetration  This was a technically difficult study  LEFT VENTRICLE: Size was normal  Systolic function was normal  Ejection   fraction was estimated to be 60 %  There were no regional wall motion   abnormalities  Wall thickness was normal  DOPPLER: Left ventricular diastolic   function parameters were normal    RIGHT VENTRICLE: The size was normal  Systolic function was normal  Wall   thickness was normal    LEFT ATRIUM: Size was normal    RIGHT ATRIUM: Size was normal    MITRAL VALVE: Valve structure was normal  There was normal leaflet separation  DOPPLER: The transmitral velocity was within the normal range  There was no   evidence for stenosis  There was trace regurgitation  AORTIC VALVE: The valve was trileaflet  Leaflets exhibited normal thickness    and   normal cuspal separation  DOPPLER: Transaortic velocity was within the normal   range  There was no evidence for stenosis  There was no regurgitation  TRICUSPID VALVE: The valve structure was normal  There was normal leaflet   separation  DOPPLER: The transtricuspid velocity was within the normal range     There was no evidence for stenosis  There was trace regurgitation  The   tricuspid jet envelope definition was inadequate for estimation of RV systolic   pressure  There are no indirect findings suggestive of moderate or severe   pulmonary hypertension  PULMONIC VALVE: Leaflets exhibited normal thickness, no calcification, and   normal cuspal separation  DOPPLER: The transpulmonic velocity was within the   normal range  There was no regurgitation  PERICARDIUM: There was no pericardial effusion  The pericardium was normal in   appearance  AORTA: The root exhibited normal size  SYSTEMIC VEINS: IVC: The inferior vena cava was normal in size and course     Respirophasic changes were normal    SYSTEM MEASUREMENT TABLES   2D   %FS: 34 57 %   Ao Diam: 3 11 cm   EDV(Teich): 104 82 ml   EF(Cube): 71 99 %   TRANSTHORACIC ECHOCARDIOGRAM   Patient: Nisha Rizo   MR number: [id number]    ------ Page 3   EF(Teich): 63 64 %   ESV(Cube): 29 98 ml   ESV(Teich): 38 11 ml   IVSd: 1 17 cm   LA Area: 15 38 cm2   LA Diam: 3 77 cm   LVEDV MOD A4C: 116 43 ml   LVEF MOD A4C: 65 04 %   LVESV MOD A4C: 40 7 ml   LVIDd: 4 75 cm   LVIDs: 3 11 cm   LVLd A4C: 8 37 cm   LVLs A4C: 6 67 cm   LVPWd: 1 07 cm   RA Area: 17 52 cm2   RV Diam : 3 09 cm   SV MOD A4C: 75 73 ml   SV(Cube): 77 06 ml   SV(Teich): 66 71 ml   CW   TR Vmax: 2 24 m/s   TR maxP 09 mmHg   MM   TAPSE: 2 43 cm   PW   E': 0 09 m/s   E/E': 10   MV A [place]: 0 59 m/s   MV Dec [place]: 3 95 m/s2   MV DecT: 238 15 ms   MV E Dam[place]: 0 94 m/s   MV E/A Ratio: 1 6   [facility] Accredited [facility]   Prepared and electronically signed by   Estelle Abraham MD   Signed [id number] 16:45:51

## 2018-02-01 RX ORDER — HYOSCYAMINE SULFATE EXTENDED-RELEASE 0.38 MG/1
1 TABLET ORAL EVERY 12 HOURS PRN
COMMUNITY
Start: 2017-07-18 | End: 2019-05-21 | Stop reason: SDUPTHER

## 2018-02-01 RX ORDER — DILTIAZEM HYDROCHLORIDE 120 MG/1
1 CAPSULE, EXTENDED RELEASE ORAL DAILY
COMMUNITY
Start: 2014-06-16 | End: 2018-02-02 | Stop reason: SDUPTHER

## 2018-02-01 RX ORDER — VALACYCLOVIR HYDROCHLORIDE 1 G/1
1 TABLET, FILM COATED ORAL 3 TIMES DAILY PRN
COMMUNITY
Start: 2016-01-25

## 2018-02-01 RX ORDER — AMPICILLIN TRIHYDRATE 250 MG
CAPSULE ORAL 2 TIMES DAILY
COMMUNITY
End: 2019-10-04 | Stop reason: ALTCHOICE

## 2018-02-02 ENCOUNTER — OFFICE VISIT (OUTPATIENT)
Dept: CARDIOLOGY CLINIC | Facility: CLINIC | Age: 53
End: 2018-02-02
Payer: COMMERCIAL

## 2018-02-02 VITALS
HEIGHT: 65 IN | BODY MASS INDEX: 32.09 KG/M2 | SYSTOLIC BLOOD PRESSURE: 102 MMHG | WEIGHT: 192.6 LBS | HEART RATE: 52 BPM | DIASTOLIC BLOOD PRESSURE: 62 MMHG

## 2018-02-02 DIAGNOSIS — R00.2 PALPITATION: Primary | ICD-10-CM

## 2018-02-02 DIAGNOSIS — I49.1 PAC (PREMATURE ATRIAL CONTRACTION): ICD-10-CM

## 2018-02-02 DIAGNOSIS — E78.2 MIXED DYSLIPIDEMIA: ICD-10-CM

## 2018-02-02 DIAGNOSIS — I47.1 PSVT (PAROXYSMAL SUPRAVENTRICULAR TACHYCARDIA) (HCC): ICD-10-CM

## 2018-02-02 PROBLEM — E78.5 DYSLIPIDEMIA: Status: RESOLVED | Noted: 2018-01-30 | Resolved: 2018-02-02

## 2018-02-02 PROCEDURE — 99214 OFFICE O/P EST MOD 30 MIN: CPT | Performed by: INTERNAL MEDICINE

## 2018-02-02 PROCEDURE — 93000 ELECTROCARDIOGRAM COMPLETE: CPT | Performed by: INTERNAL MEDICINE

## 2018-02-02 RX ORDER — DILTIAZEM HYDROCHLORIDE 120 MG/1
120 CAPSULE, EXTENDED RELEASE ORAL DAILY
Qty: 90 CAPSULE | Refills: 3 | Status: SHIPPED | OUTPATIENT
Start: 2018-02-02 | End: 2019-02-11 | Stop reason: SDUPTHER

## 2018-02-26 DIAGNOSIS — I47.1 PAROXYSMAL SVT (SUPRAVENTRICULAR TACHYCARDIA) (HCC): Primary | ICD-10-CM

## 2018-02-26 RX ORDER — DILTIAZEM HYDROCHLORIDE 120 MG/1
CAPSULE, EXTENDED RELEASE ORAL
Qty: 90 CAPSULE | Refills: 3 | Status: SHIPPED | OUTPATIENT
Start: 2018-02-26 | End: 2019-02-11 | Stop reason: SDUPTHER

## 2018-07-02 ENCOUNTER — OFFICE VISIT (OUTPATIENT)
Dept: OBGYN CLINIC | Facility: OTHER | Age: 53
End: 2018-07-02
Payer: COMMERCIAL

## 2018-07-02 VITALS
DIASTOLIC BLOOD PRESSURE: 79 MMHG | HEIGHT: 65 IN | BODY MASS INDEX: 31.99 KG/M2 | WEIGHT: 192 LBS | HEART RATE: 54 BPM | SYSTOLIC BLOOD PRESSURE: 117 MMHG

## 2018-07-02 DIAGNOSIS — M76.61 ACHILLES TENDINITIS OF RIGHT LOWER EXTREMITY: Primary | ICD-10-CM

## 2018-07-02 PROCEDURE — 99203 OFFICE O/P NEW LOW 30 MIN: CPT | Performed by: STUDENT IN AN ORGANIZED HEALTH CARE EDUCATION/TRAINING PROGRAM

## 2018-07-02 RX ORDER — PREDNISOLONE ACETATE 10 MG/ML
SUSPENSION/ DROPS OPHTHALMIC
Refills: 0 | COMMUNITY
Start: 2018-03-28 | End: 2019-10-04 | Stop reason: ALTCHOICE

## 2018-07-02 NOTE — PROGRESS NOTES
Assessment:       1  Achilles tendinitis of right lower extremity          Plan:    Eduardo Solomon appears to be experiencing insertional achilles tendinitis based on history and physical exam likely secondary to a calcaneal spur  She has tried a foot night-splint, which helps slightly  She was advised to undergo a course of physical therapy, as well as home modalities such as TENS unit, ice, rest and a heel lift in her shoes  She will return to the office in approximately 2 months for follow evaluation  She acknowledged understanding and agreement with the plan  Subjective:     Patient ID: Maynor Iglesias is a 47 y o  female  Chief Complaint:    HPI     Eduardo Solomon is a 47year old female that comes to the office due to concerns of right posterior heel pain  Her symptoms began in October 2017, which have been becoming gradually more progressive  She undertakes regular activity in the form of spinning classes, step-up aerobics, walking and running  Her pain is intermittent, worse when taking a first step in the morning and after prolonged sitting, it is sharp in quality, lasts a couple of seconds, non-radiating  She is not taking anything for the pain  Has tried a night splint she obtained previously while being treated for plantar fascitis, which helps a little bit  No precipitating injuries or trauma reported  Further denies numbness, tingling, weakness  Social History     Occupational History    Not on file  Social History Main Topics    Smoking status: Former Smoker    Smokeless tobacco: Never Used    Alcohol use Yes    Drug use: No    Sexual activity: Not on file      Review of Systems   Constitutional: Negative  HENT: Negative  Respiratory: Negative  Cardiovascular: Negative  Gastrointestinal: Negative  Musculoskeletal: Positive for arthralgias (foot pain (right))  Negative for back pain, gait problem and joint swelling  Neurological: Negative              Objective: Right Ankle Exam     Tenderness   Right ankle tenderness location: insertional site of achilles tendon on calcaneus  Range of Motion   The patient has normal right ankle ROM  Muscle Strength   The patient has normal right ankle strength  Tests   Anterior drawer: negative  Other   Erythema: absent  Scars: absent  Sensation: normal  Pulse: present           Observations     Right Ankle/Foot   Negative for adhesive scar  Strength/Myotome Testing     Right Ankle/Foot   Normal strength  Physical Exam   Constitutional: She is oriented to person, place, and time  She appears well-developed and well-nourished  No distress  HENT:   Head: Normocephalic and atraumatic  Eyes: EOM are normal    Neck: Neck supple  Pulmonary/Chest: Effort normal    Abdominal: Soft  Neurological: She is alert and oriented to person, place, and time  Psychiatric: She has a normal mood and affect

## 2018-07-10 ENCOUNTER — EVALUATION (OUTPATIENT)
Dept: PHYSICAL THERAPY | Facility: CLINIC | Age: 53
End: 2018-07-10
Payer: COMMERCIAL

## 2018-07-10 DIAGNOSIS — M76.61 ACHILLES TENDINITIS OF RIGHT LOWER EXTREMITY: ICD-10-CM

## 2018-07-10 PROCEDURE — G8979 MOBILITY GOAL STATUS: HCPCS | Performed by: PHYSICAL THERAPIST

## 2018-07-10 PROCEDURE — 97161 PT EVAL LOW COMPLEX 20 MIN: CPT | Performed by: PHYSICAL THERAPIST

## 2018-07-10 PROCEDURE — G8978 MOBILITY CURRENT STATUS: HCPCS | Performed by: PHYSICAL THERAPIST

## 2018-07-10 PROCEDURE — 97110 THERAPEUTIC EXERCISES: CPT | Performed by: PHYSICAL THERAPIST

## 2018-07-10 NOTE — PROGRESS NOTES
PT Evaluation     Today's date: 7/10/2018  Patient name: Musa Worthington  : 1965  MRN: [id number]  Referring provider: Camelia Hoyt MD  Dx:   Encounter Diagnosis     ICD-10-CM    1  Achilles tendinitis of right lower extremity M76 61 Ambulatory referral to Physical Therapy                  Assessment    Assessment details: Musa Worthington is a pleasant 47 y o  presenting to physical therapy with MD referral for Achilles tendinitis of right lower extremity  Problem list:  Mild Limited foot ROM, decreased lower extremity/core strength, and limited lower extremity flexibility    Treatment to include: Manual therapy techniques (gabi), lower extremity/core strengthening, neuromuscular control exercises, balance/proprioception training, instruction in a comprehensive HEP, and modalities as needed  This pt would benefit from skilled PT services to address their impairments and functional limitations to maximize functional outcome  Barriers to therapy: Tachycardia, palpitations  Understanding of Dx/Px/POC: good   Prognosis: good    Goals  ST  Pt will improve hip abduction strength 4+/5 in 3 weeks  2  Pt will be able to perform 10 heel raises on right prior to increase in pain in 3 weeks  LT  Pt will be able to return to running with minimal to no discomfort in 6 weeks  2  Pt will be independent in a comprehensive HEP in 6 weeks  Plan  Patient would benefit from: skilled physical therapy  Frequency: 2x week  Duration in weeks: 6  Treatment plan discussed with: patient        Subjective Evaluation    History of Present Illness  Mechanism of injury: Pt reports she spins, runs, walks, swims and does step aerobics  Pt states 2017, she began to experience heel pain which is sharp in nature which occurs initiating walking after sitting  Pt states she has been self treating with stretching and ice   Pt saw ortho who performed a diagnostic ultrasound which revealed small bone spurs and  recommended physical therapy  Premorbid status:  - ADLs: Independent with no difficulty  - Work: Full time, Full duty - [employer]   - Recreation:aerobics 5 days per week  Current status:  - ADLs/Functional activities:   - Stairs Reciprocal pattern descending with Pain Levels: severe pain   - Sit to stand after prolonged sitting with severe pain   - Walking over uneven ground with sporadic sharp pain   - Sleeping with 0 nightly sleep disturbances due to pain (wearing a foot splint)   - Running pain prior to warming up, then every few strides with a sharp stabbing pain, and no pain afterwards   - Spinning- pain in dorsiflexed position               - Moderate pain with direct pressure      - Work: Full time, Full duty  - Recreation: 3-4 days per week  Pain  Current pain ratin  At best pain ratin  At worst pain rating: 10  Location: distal achilles  Quality: sharp  Relieving factors: rest  Aggravating factors: stair climbing and running  Progression: worsening          Objective     Observations     Additional Observation Details  Normal arches bilaterally, no swelling present    Palpation     Additional Palpation Details  Increased tenderness to palpation over distal achilles insertion, no tenderness over mid achilles  Increased tension noted in right gastroc as compared to left      Active Range of Motion   Left Ankle/Foot   Dorsiflexion (ke): 10 degrees   Plantar flexion: 70 degrees   Inversion: 40 degrees   Eversion: 10 degrees   Great toe flexion: 40 degrees     Right Ankle/Foot   Dorsiflexion (ke): 13 degrees   Plantar flexion: 70 degrees   Inversion: 40 degrees   Eversion: 10 degrees   Great toe extension: 55 degrees     Passive Range of Motion   Left Ankle/Foot    Dorsiflexion (ke): 13 degrees     Right Ankle/Foot    Dorsiflexion (ke): 15 degrees     Strength/Myotome Testing     Left Hip   Planes of Motion   Flexion: 4+  Extension: 4-  Abduction: 4-  Adduction: 4-  External rotation: 4+  Internal rotation: 4+    Right Hip   Planes of Motion   Flexion: 4+  Extension: 4-  Abduction: 4-  Adduction: 4-  External rotation: 4+  Internal rotation: 4+    Left Knee   Flexion: 4+  Extension: 4+    Right Knee   Flexion: 4+  Extension: 4+    Left Ankle/Foot   Dorsiflexion: 5  Plantar flexion: 5  Inversion: 5  Eversion: 5    Right Ankle/Foot   Dorsiflexion: 5  Plantar flexion: 5  Inversion: 5  Eversion: 5    Additional Strength Details  SLS L: 30 seconds  SLS R: 30  Seconds    Squat: to 90 degrees knee flexion with no abnormalities    Heel raises:  L: 25 reps, no pain  R: 25 reps today, (first 5 reps or so, mod pain, by rep 25, mild pain and tightness in gastroc)    Tests   Left Ankle/Foot   Negative for [place]  Right Ankle/Foot   Negative for [place]  Precautions: tachycardia, palpitations    Daily Treatment Diary     Manual  7-10 (IE)            Graston to achilles insertion             STM to gastroc                                                        Exercise Diary  7-10 (IE)            Upright bike  5 mins NV                         Seated:             - BAPs (M/L, A/P, CW/CCW) 20 ea NV            - ankle 4 way 20 x ea GTB                         Standing:             - rockerboard A/P, M/L 20 taps, 30" balance NV            - TB hip 4 way              - lateral step up and over             - lateral heel taps             - Eccentric heel raises  10 x (up with 2, down on R)            - lateral band walk             - retroband walks                          Laying:             - hip abd 3 x 10 ea NV  HEP          - hip add 3 x 10 ea NV  HEP          - hip ext 3 x 10 ea NV  HEP          - bridges with TB around knees 2 x 10 Green TB NV            - SL clams 2 x 10 ea Green TB NV                                          Modalities                                                       * On initial evaluation, educated pt on anatomy, pathology, and exercise rationale   Reviewed that rest is important and to temporarily restrict running and step aerobics and focus on cycling and swimming  Educated pt on proper footwear choices  Provided pt with basic HEP and ensured proper exercise performance  Pt arrived to session 15 minutes late  Access Code: [email or web address]   URL: [email or web address]   Date: 07/10/2018   Prepared by: Allison Ellsworth      Exercises  · Gastroc Stretch on Wall - 4 reps - 30 seconds hold - 4x weekly  · Soleus Stretch on Wall - 4 reps - 30 seconds hold - 4x weekly

## 2018-07-16 ENCOUNTER — OFFICE VISIT (OUTPATIENT)
Dept: PHYSICAL THERAPY | Facility: CLINIC | Age: 53
End: 2018-07-16
Payer: COMMERCIAL

## 2018-07-16 DIAGNOSIS — M76.61 ACHILLES TENDINITIS OF RIGHT LOWER EXTREMITY: Primary | ICD-10-CM

## 2018-07-16 PROCEDURE — 97140 MANUAL THERAPY 1/> REGIONS: CPT

## 2018-07-16 PROCEDURE — 97110 THERAPEUTIC EXERCISES: CPT

## 2018-07-16 NOTE — PROGRESS NOTES
Daily Note     Today's date: 2018  Patient name: Anjum Hall  : 1965  MRN: [id number]  Referring provider: Lesly Colby MD  Dx:   Encounter Diagnosis     ICD-10-CM    1  Achilles tendinitis of right lower extremity M76 61                   Subjective: Patient reports no significant change in symptoms since here IE  She has been compliant with HEP and notes gastroc stretch feels good  Objective: Margo Goldmann, STM, and exercise program today as indicated  Provided patient with updated HEP to include table hip exercises and gastroc towel stretch   See treatment diary below    Precautions: tachycardia, palpitations     Daily Treatment Diary      Manual  -10 (IE)                     Graston to achilles insertion    5'                   STM to gastroc    10'                                                                                                 Exercise Diary  7-10 (IE)                     Upright bike  5 mins NV  5'                                           Seated:                       - BAPs (M/L, A/P, CW/CCW) 20 ea NV  20 ea                   - ankle 4 way 20 x ea GTB 20 x ea GTB                                           Standing:                       - rockerboard A/P, M/L 20 taps, 30" balance NV  20 taps, 30" balance                   - TB hip 4 way                        - lateral step up and over                       - lateral heel taps                       - Eccentric heel raises  10 x (up with 2, down on R)  15 x (up with 2, down on R)                   - lateral band walk                       - retroband walks                                               Laying:                       - hip abd 3 x 10 ea NV  3 x 10 ea HEP                 - hip add 3 x 10 ea NV  3 x 10 ea HEP                 - hip ext 3 x 10 ea NV  3 x 10 ea HEP                 - bridges with TB around knees 2 x 10 Green TB NV 2 x 10 GTB                   - SL clams 2 x 10 ea Green TB NV 2 x 10 ea GTB                                                                         Modalities                                                                                                  Assessment: Tolerated treatment well  Tender to achilles tendon near insertion on calcaneous with graston  No increased pain with exercises performed this session  Patient would benefit from continued PT      Plan: Progress treatment as tolerated

## 2018-07-19 ENCOUNTER — APPOINTMENT (OUTPATIENT)
Dept: PHYSICAL THERAPY | Facility: CLINIC | Age: 53
End: 2018-07-19
Payer: COMMERCIAL

## 2018-07-24 ENCOUNTER — OFFICE VISIT (OUTPATIENT)
Dept: PHYSICAL THERAPY | Facility: CLINIC | Age: 53
End: 2018-07-24
Payer: COMMERCIAL

## 2018-07-24 DIAGNOSIS — M76.61 ACHILLES TENDINITIS OF RIGHT LOWER EXTREMITY: Primary | ICD-10-CM

## 2018-07-24 PROCEDURE — 97530 THERAPEUTIC ACTIVITIES: CPT

## 2018-07-24 PROCEDURE — 97110 THERAPEUTIC EXERCISES: CPT

## 2018-07-24 PROCEDURE — 97140 MANUAL THERAPY 1/> REGIONS: CPT

## 2018-07-24 NOTE — PROGRESS NOTES
Daily Note     Today's date: 2018  Patient name: Jace Hensley  : 1965  MRN: [id number]  Referring provider: Anisa Cristina MD  Dx:   Encounter Diagnosis     ICD-10-CM    1  Achilles tendinitis of right lower extremity M76 61                   Subjective: Patient reports she felt "really good, like I was healed" for ~36 hours following last session at which point her symptoms returned      Objective: See treatment diary below     Precautions: tachycardia, palpitations     Daily Treatment Diary      Manual  -10 (IE)                   Graston to achilles insertion    5'  7'                 STM to gastroc    10'  8' /c stretching                                                                                               Exercise Diary  -10 (IE)                   Upright bike  5 mins NV  5'  5'                                         Seated:                       - BAPs (M/L, A/P, CW/CCW) 20 ea NV  20 ea  20 ea                  - ankle 4 way 20 x ea GTB 20 x ea GTB  30 x ea GTB                 - towel gastroc stretch   :20x4 :20x4                              Standing:                       - rockerboard A/P, M/L 20 taps, 30" balance NV  20 taps, 30" balance  20 taps, 30" balance ea                 - TB hip 4 way                        - lateral step up and over                       - lateral heel taps                       - Eccentric heel raises  10 x (up with 2, down on R)  15 x (up with 2, down on R)  10 x lowering to count of :05 (up /c 2, down on R)                 - lateral band walk                       - retroband walks                                               Laying:                       - hip abd 3 x 10 ea NV  3 x 10 ea HEP                 - hip add 3 x 10 ea NV  3 x 10 ea HEP                 - hip ext 3 x 10 ea NV  3 x 10 ea HEP                 - bridges with TB around knees 2 x 10 Green TB NV 2 x 10 GTB  3 x 10 GTB                 - SL clams 2 x 10 ea Green TB NV 2 x 10 ea GTB  3 x 10 ea GTB                                                                       Modalities                                                                                                   Assessment: Tolerated treatment well  Continued tenderness to achilles tendon at insertion on calcaneous  Challenged by single leg eccentric lowering to a count of :05    Patient would benefit from continued PT      Plan: Progress treatment as tolerated

## 2018-07-26 ENCOUNTER — OFFICE VISIT (OUTPATIENT)
Dept: PHYSICAL THERAPY | Facility: CLINIC | Age: 53
End: 2018-07-26
Payer: COMMERCIAL

## 2018-07-26 DIAGNOSIS — M76.61 ACHILLES TENDINITIS OF RIGHT LOWER EXTREMITY: Primary | ICD-10-CM

## 2018-07-26 PROCEDURE — 97140 MANUAL THERAPY 1/> REGIONS: CPT

## 2018-07-26 PROCEDURE — 97110 THERAPEUTIC EXERCISES: CPT

## 2018-07-26 NOTE — PROGRESS NOTES
Daily Note     Today's date: 2018  Patient name: Jace Hensley  : 1965  MRN: [id number]  Referring provider: Anisa Cristina MD  Dx:   Encounter Diagnosis     ICD-10-CM    1   Achilles tendinitis of right lower extremity M76 61                   Subjective: Patient reports she felt good all day Wednesday (last session was Tuesday), but pain returned upon waking this morning      Objective: See treatment diary below     Precautions: tachycardia, palpitations     Daily Treatment Diary      Manual  7-10 (IE)                 Graston to achilles insertion    5'  7'  7'               STM to gastroc    10'  8' /c stretching  8'                                                                                             Exercise Diary  7-10 (IE)                 Upright bike  5 mins NV  5'  5'  5'                                       Seated:                       - BAPs (M/L, A/P, CW/CCW) 20 ea NV  20 ea  20 ea   20 ea               - ankle 4 way 20 x ea GTB 20 x ea GTB  30 x ea GTB  30 x ea GTB               - towel gastroc stretch   :20x4 :20x4 :20x5                                       Standing:                       - rockerboard A/P, M/L 20 taps, 30" balance NV  20 taps, 30" balance  20 taps, 30" balance ea  20 taps, 30" balance ea               - TB hip 4 way                        - lateral step up and over                       - lateral heel taps                       - Eccentric heel raises  10 x (up with 2, down on R)  15 x (up with 2, down on R)  10 x lowering to count of :05 (up /c 2, down on R)  15x lowering to count of :05 (up /c 2, down on R)               - lateral band walk                       - retroband walks                                               Laying:                       - hip abd 3 x 10 ea NV  3 x 10 ea HEP                 - hip add 3 x 10 ea NV  3 x 10 ea HEP                 - hip ext 3 x 10 ea NV  3 x 10 ea HEP                 - bridges with TB around knees 2 x 10 Green TB NV 2 x 10 GTB  3 x 10 GTB                 - SL clams 2 x 10 ea Green TB NV 2 x 10 ea GTB  3 x 10 ea GTB                                                                       Modalities                                                                                                    Assessment: Tolerated treatment fair  "Hot spot" present with graston at achilles tendon insertion today  Continues to be most challenged by single leg eccentric heel raises  Suggested patient trial performing stretches before getting out of bed in the AM   Patient would benefit from continued PT      Plan: Progress treatment as tolerated

## 2018-07-31 ENCOUNTER — OFFICE VISIT (OUTPATIENT)
Dept: PHYSICAL THERAPY | Facility: CLINIC | Age: 53
End: 2018-07-31
Payer: COMMERCIAL

## 2018-07-31 DIAGNOSIS — M76.61 ACHILLES TENDINITIS OF RIGHT LOWER EXTREMITY: Primary | ICD-10-CM

## 2018-07-31 PROCEDURE — 97140 MANUAL THERAPY 1/> REGIONS: CPT | Performed by: PHYSICAL THERAPIST

## 2018-07-31 PROCEDURE — 97110 THERAPEUTIC EXERCISES: CPT | Performed by: PHYSICAL THERAPIST

## 2018-07-31 NOTE — PROGRESS NOTES
Daily Note     Today's date: 2018  Patient name: David Becerril  : 1965  MRN: [id number]  Referring provider: Marjan Greenwood MD  Dx:   Encounter Diagnosis     ICD-10-CM    1   Achilles tendinitis of right lower extremity M76 61                   Subjective: notes less sharp pain and more stiffness; with initial weight bearing was painful when getting out of the car prior to entering the building today     Objective: See treatment diary below     Precautions: tachycardia, palpitations     Daily Treatment Diary      Manual  7-10 (IE)               Graston to achilles insertion    5'  7'  7'  7'             STM to gastroc    10'  8' /c stretching  8' 8'                                                                                           Exercise Diary  7-10 (IE)               Upright bike  5 mins NV  5'  5'  5'  5'                                     Seated:                       - BAPs (M/L, A/P, CW/CCW) 20 ea NV  20 ea  20 ea   20 ea  30ea             - ankle 4 way 20 x ea GTB 20 x ea GTB  30 x ea GTB  30 x ea GTB  blue 30ea, :30x4 for PF             - towel gastroc stretch   :20x4 :20x4 :20x5  :20x5                                     Standing:                       - rockerboard A/P, M/L 20 taps, 30" balance NV  20 taps, 30" balance  20 taps, 30" balance ea  20 taps, 30" balance ea  30ea             - TB hip 4 way                        - lateral step up and over                       - lateral heel taps                       - Eccentric heel raises  10 x (up with 2, down on R)  15 x (up with 2, down on R)  10 x lowering to count of :05 (up /c 2, down on R)  15x lowering to count of :05 (up /c 2, down on R)  #70  :10 hold, :05 lower, 10 reps             - lateral band walk                       - retroband walks                                               Laying:                       - hip abd 3 x 10 ea NV  3 x 10 ea HEP                 - hip add 3 x 10 ea NV  3 x 10 ea HEP                 - hip ext 3 x 10 ea NV  3 x 10 ea HEP                 - bridges with TB around knees 2 x 10 Green TB NV 2 x 10 GTB  3 x 10 GTB                 - SL clams 2 x 10 ea Green TB NV 2 x 10 ea GTB  3 x 10 ea GTB                                                                       Modalities                                                                                                    Assessment: Tolerated treatment fair  Patient would benefit from continued PT  Tender area about 5cm in length, 1cm width along medial achilles between 1/2 and distal 1/3rd  Plan: Progress treatment as tolerated

## 2018-08-02 ENCOUNTER — OFFICE VISIT (OUTPATIENT)
Dept: PHYSICAL THERAPY | Facility: CLINIC | Age: 53
End: 2018-08-02
Payer: COMMERCIAL

## 2018-08-02 DIAGNOSIS — M76.61 ACHILLES TENDINITIS OF RIGHT LOWER EXTREMITY: Primary | ICD-10-CM

## 2018-08-02 PROCEDURE — 97110 THERAPEUTIC EXERCISES: CPT

## 2018-08-02 PROCEDURE — 97140 MANUAL THERAPY 1/> REGIONS: CPT

## 2018-08-02 NOTE — PROGRESS NOTES
Daily Note     Today's date: 2018  Patient name: Dima Son  : 1965  MRN: [id number]  Referring provider: Enoc De MD  Dx:   Encounter Diagnosis     ICD-10-CM    1   Achilles tendinitis of right lower extremity M76 61                   Subjective: Patient did not have pain for two days after treatment, notes onset of stiffness today but no sharp pain      Objective: See treatment diary below     Precautions: tachycardia, palpitations     Daily Treatment Diary      Manual  7-10 (IE)    82           Graston to achilles insertion    5'  7'  7'  7'  7'           STM to gastroc    10'  8' /c stretching  8'  8'  8'                                                                                         Exercise Diary  7-10 (IE)  2           Upright bike  5 mins NV  5'  5'  5'  5'  5'                                   Seated:                       - BAPs (M/L, A/P, CW/CCW) 20 ea NV  20 ea  20 ea   20 ea  30ea  30 ea           - ankle 4 way 20 x ea GTB 20 x ea GTB  30 x ea GTB  30 x ea GTB  blue 30ea, :30x4 for PF  blue 30ea :30x4 for PF           - towel gastroc stretch   :20x4 :20x4 :20x5  :20x5 :20x5                                   Standing:                       - rockerboard A/P, M/L 20 taps, 30" balance NV  20 taps, 30" balance  20 taps, 30" balance ea  20 taps, 30" balance ea  30ea  30 ea           - TB hip 4 way                        - lateral step up and over                       - lateral heel taps                       - Eccentric heel raises  10 x (up with 2, down on R)  15 x (up with 2, down on R)  10 x lowering to count of :05 (up /c 2, down on R)  15x lowering to count of :05 (up /c 2, down on R)  #70  :10 hold, :05 lower, 10 reps performed at gym this AM           - lateral band walk                       - retroband walks                                               Laying:                       - hip abd 3 x 10 ea NV  3 x 10 ea HEP                 - hip add 3 x 10 ea NV  3 x 10 ea HEP                 - hip ext 3 x 10 ea NV  3 x 10 ea HEP                 - bridges with TB around knees 2 x 10 Green TB NV 2 x 10 GTB  3 x 10 GTB                 - SL clams 2 x 10 ea Green TB NV 2 x 10 ea GTB  3 x 10 ea GTB                                                                       Modalities                                                                                                     Assessment: Tolerated treatment well  Tender area along medial achilles between 1/2 and distal 1/3rd  Patient would benefit from continued PT       Plan: Continue per plan of care

## 2018-08-06 ENCOUNTER — APPOINTMENT (OUTPATIENT)
Dept: PHYSICAL THERAPY | Facility: CLINIC | Age: 53
End: 2018-08-06
Payer: COMMERCIAL

## 2018-08-09 ENCOUNTER — APPOINTMENT (OUTPATIENT)
Dept: PHYSICAL THERAPY | Facility: CLINIC | Age: 53
End: 2018-08-09
Payer: COMMERCIAL

## 2018-08-13 ENCOUNTER — APPOINTMENT (OUTPATIENT)
Dept: PHYSICAL THERAPY | Facility: CLINIC | Age: 53
End: 2018-08-13
Payer: COMMERCIAL

## 2018-08-16 ENCOUNTER — APPOINTMENT (OUTPATIENT)
Dept: PHYSICAL THERAPY | Facility: CLINIC | Age: 53
End: 2018-08-16
Payer: COMMERCIAL

## 2018-08-20 ENCOUNTER — OFFICE VISIT (OUTPATIENT)
Dept: PHYSICAL THERAPY | Facility: CLINIC | Age: 53
End: 2018-08-20
Payer: COMMERCIAL

## 2018-08-20 DIAGNOSIS — M76.61 ACHILLES TENDINITIS OF RIGHT LOWER EXTREMITY: Primary | ICD-10-CM

## 2018-08-20 PROCEDURE — 97110 THERAPEUTIC EXERCISES: CPT | Performed by: PHYSICAL MEDICINE & REHABILITATION

## 2018-08-20 PROCEDURE — 97140 MANUAL THERAPY 1/> REGIONS: CPT | Performed by: PHYSICAL MEDICINE & REHABILITATION

## 2018-08-20 NOTE — PROGRESS NOTES
Daily Note     Today's date: 2018  Patient name: Clinton Patel  : 1965  MRN: [id number]  Referring provider: Jessica Narayan MD  Dx:   Encounter Diagnosis     ICD-10-CM    1  Achilles tendinitis of right lower extremity M76 61                   Subjective: Today is patient's first visit since  secondary to family commitments  Patient denies issue or change in status since last visit   Patient notes she wore a low heel today which keeps symptoms bearable vs  Flats      Objective: See treatment diary below     Precautions: tachycardia, palpitations     Daily Treatment Diary      Manual  7-10 (IE)           Graston to achilles insertion    5'  7'  7'  7'  7' [facility]         STM to gastroc    10'  8' /c stretching  8'  8'  8' [facility]                                                                                       Exercise Diary  7-10 (IE)         Upright bike  5 mins NV  5'  5'  5'  5'  5'  5'                             Seated:                     - BAPs (M/L, A/P, CW/CCW) 20 ea NV  20 ea  20 ea   20 ea  30ea  30 ea         - ankle 4 way 20 x ea GTB 20 x ea GTB  30 x ea GTB  30 x ea GTB  blue 30ea, :30x4 for PF  blue 30ea :30x4 for PF         - towel gastroc stretch   :20x4 :20x4 :20x5  :20x5 :20x5                               Standing:                     - rockerboard A/P, M/L 20 taps, 30" balance NV  20 taps, 30" balance  20 taps, 30" balance ea  20 taps, 30" balance ea  30ea  30 ea  30x ea       - TB hip 4 way                      - lateral step up and over                     - lateral heel taps                     - Eccentric heel raises  10 x (up with 2, down on R)  15 x (up with 2, down on R)  10 x lowering to count of :05 (up /c 2, down on R)  15x lowering to count of :05 (up /c 2, down on R)  #70  :10 hold, :05 lower, 10 reps performed at gym this AM  15x, 70#, 10" hold w/ 5" lower       - lateral band walk                   - retroband walks                                           Laying:                     - hip abd 3 x 10 ea NV  3 x 10 ea HEP               - hip add 3 x 10 ea NV  3 x 10 ea HEP               - hip ext 3 x 10 ea NV  3 x 10 ea HEP               - bridges with TB around knees 2 x 10 Green TB NV 2 x 10 GTB  3 x 10 GTB               - SL clams 2 x 10 ea Green TB NV 2 x 10 ea GTB  3 x 10 ea GTB                                                                 Modalities                                                                                                     Assessment: Tolerated treatment well  Adhesions and discomfort through medial Achilles insertion  Patient would benefit from continued PT       Plan: Continue per plan of care

## 2018-08-23 ENCOUNTER — OFFICE VISIT (OUTPATIENT)
Dept: PHYSICAL THERAPY | Facility: CLINIC | Age: 53
End: 2018-08-23
Payer: COMMERCIAL

## 2018-08-23 DIAGNOSIS — M76.61 ACHILLES TENDINITIS OF RIGHT LOWER EXTREMITY: Primary | ICD-10-CM

## 2018-08-23 PROCEDURE — 97530 THERAPEUTIC ACTIVITIES: CPT

## 2018-08-23 PROCEDURE — 97140 MANUAL THERAPY 1/> REGIONS: CPT

## 2018-08-23 PROCEDURE — 97110 THERAPEUTIC EXERCISES: CPT

## 2018-08-23 NOTE — PROGRESS NOTES
Daily Note     Today's date: 2018  Patient name: Sher Cisneros  : 1965  MRN: [id number]  Referring provider: Prakash Parekh MD  Dx:   Encounter Diagnosis     ICD-10-CM    1  Achilles tendinitis of right lower extremity M76 61                   Subjective: Patient reports she has felt pretty good since last visit though notes she has not yet tried running        Objective: Trialed MH to begin session and continued with graston and TE as indicated    See treatment diary below     Precautions: tachycardia, palpitations     Daily Treatment Diary      Manual  7-10 (IE)         Graston to achilles insertion    5'  7'  7'  7'  7' [facility]  7'       STM to gastroc    10'  8' /c stretching  8'  8'  8' [facility]  8'                                                                                     Exercise Diary  7-10 (IE)       Upright bike  5 mins NV  5'  5'  5'  5'  5'  5'                             Seated:                     - BAPs (M/L, A/P, CW/CCW) 20 ea NV  20 ea  20 ea   20 ea  30ea  30 ea         - ankle 4 way 20 x ea GTB 20 x ea GTB  30 x ea GTB  30 x ea GTB  blue 30ea, :30x4 for PF  blue 30ea :30x4 for PF   Blue 30 ea :30x4 PF     - towel gastroc stretch   :20x4 :20x4 :20x5  :20x5 :20x5  :20 5x                           Standing:                     - rockerboard A/P, M/L 20 taps, 30" balance NV  20 taps, 30" balance  20 taps, 30" balance ea  20 taps, 30" balance ea  30ea  30 ea  30x ea  30x ea     - TB hip 4 way                      - lateral step up and over                     - lateral heel taps                     - Eccentric heel raises  10 x (up with 2, down on R)  15 x (up with 2, down on R)  10 x lowering to count of :05 (up /c 2, down on R)  15x lowering to count of :05 (up /c 2, down on R)  #70  :10 hold, :05 lower, 10 reps performed at gym this AM  15x, 70#, 10" hold w/ 5" lower  70# :10 hold /c :05 lower 15x     - lateral band walk                     - retroband walks                                           Laying:                     - hip abd 3 x 10 ea NV  3 x 10 ea HEP               - hip add 3 x 10 ea NV  3 x 10 ea HEP               - hip ext 3 x 10 ea NV  3 x 10 ea HEP               - bridges with TB around knees 2 x 10 Green TB NV 2 x 10 GTB  3 x 10 GTB               - SL clams 2 x 10 ea Green TB NV 2 x 10 ea GTB  3 x 10 ea GTB                                                                 Modalities                                                                                                     Assessment: Tolerated treatment well  Continued tenderness to medial edge of achilles tendon insertion  No increased pain with exercises  Patient would benefit from continued PT      Plan: Progress treatment as tolerated

## 2018-08-27 ENCOUNTER — APPOINTMENT (OUTPATIENT)
Dept: PHYSICAL THERAPY | Facility: CLINIC | Age: 53
End: 2018-08-27
Payer: COMMERCIAL

## 2018-08-27 PROCEDURE — G8978 MOBILITY CURRENT STATUS: HCPCS | Performed by: PHYSICAL THERAPIST

## 2018-08-27 PROCEDURE — G8979 MOBILITY GOAL STATUS: HCPCS | Performed by: PHYSICAL THERAPIST

## 2018-08-30 ENCOUNTER — APPOINTMENT (OUTPATIENT)
Dept: PHYSICAL THERAPY | Facility: CLINIC | Age: 53
End: 2018-08-30
Payer: COMMERCIAL

## 2018-09-04 ENCOUNTER — OFFICE VISIT (OUTPATIENT)
Dept: OBGYN CLINIC | Facility: OTHER | Age: 53
End: 2018-09-04
Payer: COMMERCIAL

## 2018-09-04 VITALS
DIASTOLIC BLOOD PRESSURE: 72 MMHG | SYSTOLIC BLOOD PRESSURE: 103 MMHG | BODY MASS INDEX: 33.05 KG/M2 | WEIGHT: 198.6 LBS | HEART RATE: 66 BPM

## 2018-09-04 DIAGNOSIS — M76.61 ACHILLES TENDINITIS OF RIGHT LOWER EXTREMITY: Primary | ICD-10-CM

## 2018-09-04 PROCEDURE — 99213 OFFICE O/P EST LOW 20 MIN: CPT | Performed by: ORTHOPAEDIC SURGERY

## 2018-09-04 NOTE — PROGRESS NOTES
Assessment:       1  Achilles tendinitis of right lower extremity          Plan:        Explain my current clinical findings to [facility] today  She has had good improvement of her right ankle insertional Achilles tendinitis  I have advised her to continue with home-based exercises and she may also consider using a topical Lidoderm patch for comfort on an as-needed basis  I will see her back in the office on an as-needed basis  Subjective:     Patient ID: Becky France is a 47 y o  female  Chief Complaint:    HPI  [facility] is here today for follow-up of right posterior heel pain  She was seen in this regard about 2 months ago and was referred to physical therapy for insertional Achilles tendinitis  Now she reports a significant improvement in her right posterior heel pain the she has not yet started exercising  Currently the posterior heel pain is of mild intensity and only intermittent in nature and does not interfere with her regular activities  Denies any distal tingling numbness or new injuries in this regard  Social History     Occupational History    Not on file  Social History Main Topics    Smoking status: Former Smoker    Smokeless tobacco: Never Used    Alcohol use Yes    Drug use: No    Sexual activity: Not on file      Review of Systems   Constitutional: Negative  HENT: Negative  Eyes: Negative  Respiratory: Negative  Cardiovascular: Negative  Gastrointestinal: Negative  Endocrine: Negative  Genitourinary: Negative  Skin: Negative  Allergic/Immunologic: Negative  Neurological: Negative  Hematological: Negative  Psychiatric/Behavioral: Negative  Objective:     Ortho ExamPhysical Exam   Constitutional: She is oriented to person, place, and time  She appears well-developed and well-nourished  HENT:   Head: Normocephalic and atraumatic  Eyes: Conjunctivae are normal  Pupils are equal, round, and reactive to light  Cardiovascular: Normal rate and regular rhythm  Pulmonary/Chest: Effort normal  No respiratory distress  Neurological: She is alert and oriented to person, place, and time  No cranial nerve deficit  Skin: Skin is warm and dry  No erythema  Psychiatric: She has a normal mood and affect  Her behavior is normal  Judgment and thought content normal        Right ankle examination:   No swelling or deformity  Minimal tenderness on the medial aspect of Achilles insertion  No retro malleolar tenderness on the medial lateral side  No tenderness or thickening of the Achilles midsubstance or proximal myotendinous junction  Ankle dorsiflexion is 7° with the knee in extension and normal plantar flexion  Grade 5/5 strength of all ankle movements  Clinically intact distal neurovascular status

## 2018-09-20 ENCOUNTER — ANNUAL EXAM (OUTPATIENT)
Dept: OBGYN CLINIC | Facility: CLINIC | Age: 53
End: 2018-09-20
Payer: COMMERCIAL

## 2018-09-20 VITALS
BODY MASS INDEX: 33.26 KG/M2 | SYSTOLIC BLOOD PRESSURE: 118 MMHG | DIASTOLIC BLOOD PRESSURE: 84 MMHG | WEIGHT: 199.6 LBS | HEIGHT: 65 IN

## 2018-09-20 DIAGNOSIS — Z01.419 ENCOUNTER FOR ANNUAL ROUTINE GYNECOLOGICAL EXAMINATION: Primary | ICD-10-CM

## 2018-09-20 DIAGNOSIS — Z12.39 BREAST CANCER SCREENING: ICD-10-CM

## 2018-09-20 PROCEDURE — S0612 ANNUAL GYNECOLOGICAL EXAMINA: HCPCS | Performed by: OBSTETRICS & GYNECOLOGY

## 2018-09-20 NOTE — PROGRESS NOTES
Assessment/Plan:    Pap smear deferred due to low risk status  Encouraged self-breast examination as well as calcium supplementation  Continue annual 3D mammogram   Reviewed jeremiah menopausal symptoms  She will keep a menstrual diary over the year  She does have follow-up colonoscopy with gastroenterology next month  Return to office in 1 year  No problem-specific Assessment & Plan notes found for this encounter  Diagnoses and all orders for this visit:    Encounter for annual routine gynecological examination    Breast cancer screening  -     Mammo screening bilateral w 3d & cad; Future          Subjective:      Patient ID: Jody Whitman is a 47 y o  female  HPI     This is a 46-year-old female P3 ( x3) presents for annual gyn exam   In the course of the year her cycles are irregular at mo she skipped 4 consecutive months with hot flashes and night sweats more recently she has regular every 4 weeks lasting 7 days with no breakthrough bleeding  She is currently being evaluated for 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 and is scheduled for colonoscopy next month  She denies any changes in bladder function  Her gyn history is significant for endometrial ablation in  followed by laparoscopy right salpingo-oophorectomy in  for endometriosis  Patient is sexually active and has been in a monogamous relationship for the last 30 years  All her Pap smears have been normal   Last Pap smear 2017 within normal limits  The following portions of the patient's history were reviewed and updated as appropriate: allergies, current medications, past family history, past medical history, past social history, past surgical history and problem list     Review of Systems   Constitutional: Negative for fatigue, fever and unexpected weight change  Respiratory: Negative for cough, chest tightness, shortness of breath and wheezing  Cardiovascular: Negative    Negative for chest pain and palpitations  Gastrointestinal: Negative  Negative for abdominal distention, abdominal pain, blood in stool, constipation, diarrhea, nausea and vomiting  Genitourinary: Negative  Negative for difficulty urinating, dyspareunia, dysuria, flank pain, frequency, genital sores, hematuria, pelvic pain, urgency, vaginal bleeding, vaginal discharge and vaginal pain  Skin: Negative for rash  Objective:      /84   Ht 5' 5" (1 651 m)   Wt 90 5 kg (199 lb 9 6 oz)   LMP 09/13/2018 (Exact Date)   Breastfeeding? No   BMI 33 22 kg/m²          Physical Exam   Constitutional: She appears well-developed and well-nourished  Cardiovascular: Normal rate and regular rhythm  Pulmonary/Chest: Effort normal and breath sounds normal  Right breast exhibits no inverted nipple, no mass, no nipple discharge, no skin change and no tenderness  Left breast exhibits no inverted nipple, no mass, no nipple discharge, no skin change and no tenderness  Abdominal: Soft  Bowel sounds are normal  She exhibits no distension  There is no tenderness  There is no rebound and no guarding  Genitourinary: Vagina normal and uterus normal  There is no lesion on the right labia  There is no lesion on the left labia  Cervix exhibits no discharge and no friability  Right adnexum displays no mass, no tenderness and no fullness  Left adnexum displays no mass, no tenderness and no fullness  No vaginal discharge found  Genitourinary Comments: Rectovaginal exam is confirmatory

## 2019-01-17 ENCOUNTER — OFFICE VISIT (OUTPATIENT)
Dept: FAMILY MEDICINE CLINIC | Facility: CLINIC | Age: 54
End: 2019-01-17
Payer: COMMERCIAL

## 2019-01-17 VITALS
HEART RATE: 60 BPM | BODY MASS INDEX: 34.82 KG/M2 | DIASTOLIC BLOOD PRESSURE: 80 MMHG | SYSTOLIC BLOOD PRESSURE: 124 MMHG | TEMPERATURE: 98.9 F | HEIGHT: 65 IN | WEIGHT: 209 LBS

## 2019-01-17 DIAGNOSIS — Z12.11 COLON CANCER SCREENING: ICD-10-CM

## 2019-01-17 DIAGNOSIS — R63.5 WEIGHT GAIN: ICD-10-CM

## 2019-01-17 DIAGNOSIS — E78.00 HYPERCHOLESTEROLEMIA: ICD-10-CM

## 2019-01-17 DIAGNOSIS — R53.83 OTHER FATIGUE: ICD-10-CM

## 2019-01-17 DIAGNOSIS — I10 ESSENTIAL HYPERTENSION: Primary | ICD-10-CM

## 2019-01-17 PROCEDURE — 99214 OFFICE O/P EST MOD 30 MIN: CPT | Performed by: FAMILY MEDICINE

## 2019-01-17 PROCEDURE — 3079F DIAST BP 80-89 MM HG: CPT | Performed by: FAMILY MEDICINE

## 2019-01-17 PROCEDURE — 3074F SYST BP LT 130 MM HG: CPT | Performed by: FAMILY MEDICINE

## 2019-01-17 NOTE — PROGRESS NOTES
Patient ID: Ruchi Platt is a 47 y o  female  HPI: 47 y  o female presents for follow up of hypertension, hypercholesterolemia, fatigue and complains of weight gain over past 6 mos  She is also due for a colon screening and has decided on a cologuard test      SUBJECTIVE    Family History   Problem Relation Age of Onset    Cancer Mother     Stroke Mother     Dementia Mother     Arrhythmia Father     Hypertension Father     Atrial fibrillation Father      Social History     Social History    Marital status: /Civil Union     Spouse name: N/A    Number of children: N/A    Years of education: N/A     Occupational History    Not on file       Social History Main Topics    Smoking status: Former Smoker    Smokeless tobacco: Never Used    Alcohol use Yes    Drug use: No    Sexual activity: Not on file     Other Topics Concern    Not on file     Social History Narrative    No narrative on file     Past Medical History:   Diagnosis Date    Hypercholesterolemia     Palpitations     Paroxysmal A-fib ([address] )     Per pt, she has tachycardia not A-fib on 7-10-18     Past Surgical History:   Procedure Laterality Date    BREAST BIOPSY      DILATION AND CURETTAGE OF UTERUS  2003    ENDOMETRIAL ABLATION  11/2009    OOPHORECTOMY Right 04/2012    Robotic assisted laparoscopic RSO, excision endometriosis, elevated      No Known Allergies    Current Outpatient Prescriptions:     Calcium Carb-Cholecalciferol (CALCIUM 1000 + D) 1000-800 MG-UNIT TABS, Take 1 tablet by mouth daily, Disp: , Rfl:     CARTIA  MG 24 hr capsule, TAKE 1 CAPSULE DAILY, Disp: 90 capsule, Rfl: 3    diltiazem (CARDIZEM SR) 120 mg 12 hr capsule, Take 1 capsule (120 mg total) by mouth daily, Disp: 90 capsule, Rfl: 3    hyoscyamine (LEVBID) 0 375 mg 12 hr tablet, Take 1 tablet by mouth every 12 (twelve) hours as needed, Disp: , Rfl:     L-Lysine 1000 MG TABS, Take by mouth, Disp: , Rfl:     MULTIPLE VITAMINS PO, Take by mouth, Disp: , Rfl:     Omega-3 Fatty Acids (FISH OIL) 645 MG CAPS, Take 1 capsule by mouth 4 (four) times a day, Disp: , Rfl:     prednisoLONE acetate (PRED FORTE) 1 % ophthalmic suspension, INSTILL 1 DROP IN THE LEFT EYE THREE TIMES DAILY, Disp: , Rfl: 0    Red Yeast Rice 600 MG CAPS, Take by mouth 2 (two) times a day, Disp: , Rfl:     valACYclovir (VALTREX) 1,000 mg tablet, Take 1 tablet by mouth 3 (three) times a day as needed, Disp: , Rfl:     Review of Systems  Constitutional:     Denies fever, chills ,fatigue ,weakness ,weight loss,   +weight gain     ENT: Denies earache ,loss of hearing ,nosebleed, nasal discharge,nasal congestion ,sore throat ,hoarseness  Pulmonary: Denies shortness of breath ,cough  ,dyspnea on exertion, orthopnea  ,PND   Cardiovascular:  Denies bradycardia , tachycardia  ,palpations, lower extremity edema leg, claudication  Breast:  Denies new or changing breast lumps ,nipple discharge ,nipple changes  Abdomen:  Denies abdominal pain , anorexia , indigestion, nausea, vomiting, constipation, diarrhea  Musculoskeletal: Denies myalgias, arthralgias, joint swelling, joint stiffness , limb pain, limb swelling  Gu: denies dysuria, polyuria  Skin: Denies skin rash, skin lesion, skin wound, itching, dry skin  Neuro: Denies headache, numbness, tingling, confusion, loss of consciousness, dizziness, vertigo  Psychiatric: Denies feelings of depression, suicidal ideation, anxiety, sleep disturbances    OBJECTIVE  /80   Pulse 60   Temp 98 9 °F (37 2 °C)   Ht 5' 5" (1 651 m)   Wt 94 8 kg (209 lb)   BMI 34 78 kg/m²   Constitutional:   NAD, well appearing and well nourished      ENT:   Conjunctiva and lids: no injection, edema, or discharge     Pupils and iris: BELEM bilaterally    External inspection of ears and nose: normal without deformities or discharge  Otoscopic exam: Canals patent without erythema         Nasal mucosa, septum and turbinates: Normal or edema or discharge Oropharynx:  Moist mucosa, normal tongue and tonsils without lesions  No erythema        Pulmonary:Respiratory effort normal rate and rhythm, no increased work of breathing  Auscultation of lungs:  Clear bilaterally with no adventitious breath sounds       Cardiovascular: regular rate and rhythm, S1 and S2, no murmur, no edema and/or varicosities of LE      Abdomen: Soft and non-distended     Positive bowel sounds      No heptomegaly or splenomegaly      Gu: no suprapubic tenderness or CVA tenderness, no urethral discharge  Lymphatic:  No anterior or posterior cervical lymphadenopathy         Musculoskeletal:  Gait and station: Normal gait      Digits and nails normal without clubbing or cyanosis       Inspection/palpation of joints, bones, and muscles:  No joint tenderness, swelling, full active and passive range of motion       Skin: Normal skin turgor and no rashes      Neuro:     Normal reflexes     Psych:   alert and oriented to person, place and time     normal mood and affect       Assessment/Plan:Diagnoses and all orders for this visit:    Essential hypertension  Comments:    Stable on current therapy  Orders:  -     Comprehensive metabolic panel; Future    Hypercholesterolemia  -     Lipid Panel with Direct LDL reflex; Future    Other fatigue  -     CBC and differential; Future    Weight gain  -     TSH, 3rd generation; Future    Colon cancer screening  -     Cologuard; Future        Will await labs and treat treat accordingly

## 2019-02-07 NOTE — PROGRESS NOTES
Cardiology Follow up Note    Rajinder Sanchez 47 y o  female MRN: [id number]    02/11/19          Assessment/Plan:    1  Paroxysmal SVT/PACs  Echo 7/2/14 showed normal LV size and function, EF 60%, no RWMA, normal diastolic function, normal RV size and function, no significant valvular heart disease  She had 30 day event monitor from 6/23-7/22/14, which showed sinus rhythm  When patient felt fluttering, EKG showed sinus rhythm with PACs  She did have an episode of SVT on 7/14/14 while exercising, maximum  bpm  Diltiazem 120 was started in 2014 with symptomatic improvement  Holter monitor was done 12/17 for increased palpitations, which showed average HR 61 BPM, minimum HR 42 BPM, and maximum  BPM  3412 supraventricular ectopic beats, majority of which were PACs, 11 atrial couplets  Longest R-R interval was 1 7 seconds  Palpitations mostly correlated with PACs  Currently reports palpitations mostly controlled on Diltiazem 120     2  Dyslipidemia  Lipid panel 5/27/14 showed total cholesterol 228, , TG 78, HDL 62  She is currently taking fish oil, which was started after the lipid panel results  Lipid panel 4/17 showed total cholesterol 229, , TG 45, HDL 82  She is taking red yeast rice and fish oil  She has script from Dr Kelly to recheck  3  LE edema  Mild, improves with elevation, continue to monitor  1  PSVT (paroxysmal supraventricular tachycardia) (HCC)  POCT ECG   2  PAC (premature atrial contraction)  POCT ECG   3  Mixed dyslipidemia  POCT ECG   4  Palpitation  POCT ECG       HPI:     47 y o  woman with a reported history of paroxysmal SVT, who is here for follow up  She states she was diagnosed with paroxysmal SVT around 2000, which was diagnosed with event monitor, which was done for palpitations  Normally the episodes convert on their own within minutes, but in 2014 she had two episodes that lasted longer than 10 minutes  She has tried bearing down without success   She does have a family history of atrial fibrillation in her father and uncle  She has been tried on beta blockers in the past, but developed Raynaud's phenomenon and was unable to tolerate it  Holter monitor 5/19/14 showed sinus rhythm, avg HR 71 bpm, minimum HR 41 bpm, maximum  bpm  There were isolated PVCs and PACs, rare atrial couplets, but no supraventricular tachycardia  When the patient reported possible starting of a tachycardia, Holter showed sinus tachycardia,  bpm, which occurred during exercise, with an atrial couplet  Echo 7/2/14 showed normal LV size and function, EF 60%, no RWMA, normal diastolic function, normal RV size and function, no significant valvular heart disease  She had 30 day event monitor from 6/23-7/22/14, which showed sinus rhythm  When patient felt fluttering, EKG showed sinus rhythm with PACs  She did have an episode of SVT on 7/14/14 while exercising, maximum  bpm      I had started her on Diltiazem 120 in 2014, and she had noted reduction in palpitations  Holter monitor was done 12/17 for increased palpitations, which showed average HR 61 BPM, minimum HR 42 BPM, and maximum  BPM  3412 supraventricular ectopic beats, majority of which were PACs, 11 atrial couplets  Longest R-R interval was 1 7 seconds  Palpitations mostly correlated with PACs  I had recommended she consider increasing Diltiazem from 120 to 240 if she needed to symptomatically  She reports she never increased the Diltiazem, because by the time Holter results were available the palpitations had improved on their own  She normally exercises via step classes and spin classes, denies any significant chest pain or significant shortness of breath with exertion  She had one episode at night in setting of her period as well as being dehydrated, but it resolved on its own  She reports she drank more water the following day and it did not recur   She is perimenopausal  She gets mild ankle edema, left greater than right  Usually improves with elevation, not severe, feels it might be related to Achilles tendinitis bilaterally  No orthopnea, uses 1 pillow to sleep, no PND  No dizziness or lightheadedness        Patient Active Problem List   Diagnosis    Hypercholesteremia    IBS (irritable bowel syndrome)    Menorrhagia with irregular cycle    Diverticulitis    Cyst of uterus    Corneal abrasion    Ovarian cyst    PSVT (paroxysmal supraventricular tachycardia) (HCC)    Seasonal allergies    PAC (premature atrial contraction)    Mixed dyslipidemia    Palpitation    Achilles tendinitis of right lower extremity       No Known Allergies      Current Outpatient Medications:     Calcium Carb-Cholecalciferol (CALCIUM 1000 + D) 1000-800 MG-UNIT TABS, Take 1 tablet by mouth daily, Disp: , Rfl:     hyoscyamine (LEVBID) 0 375 mg 12 hr tablet, Take 1 tablet by mouth every 12 (twelve) hours as needed, Disp: , Rfl:     L-Lysine 1000 MG TABS, Take by mouth, Disp: , Rfl:     Magnesium 100 MG CAPS, Take by mouth, Disp: , Rfl:     MULTIPLE VITAMINS PO, Take by mouth, Disp: , Rfl:     Omega-3 Fatty Acids (FISH OIL) 645 MG CAPS, Take 1 capsule by mouth 4 (four) times a day, Disp: , Rfl:     Red Yeast Rice 600 MG CAPS, Take by mouth 2 (two) times a day, Disp: , Rfl:     valACYclovir (VALTREX) 1,000 mg tablet, Take 1 tablet by mouth 3 (three) times a day as needed, Disp: , Rfl:     CARTIA  MG 24 hr capsule, TAKE 1 CAPSULE DAILY (Patient not taking: Reported on 2/11/2019), Disp: 90 capsule, Rfl: 3    prednisoLONE acetate (PRED FORTE) 1 % ophthalmic suspension, INSTILL 1 DROP IN THE LEFT EYE THREE TIMES DAILY, Disp: , Rfl: 0    Past Medical History:   Diagnosis Date    Hypercholesterolemia     Palpitations     Paroxysmal A-fib (HCC)     Per pt, she has tachycardia not A-fib on 7-10-18       Family History   Problem Relation Age of Onset    Cancer Mother     Stroke Mother     Dementia Mother  Arrhythmia Father     Hypertension Father     Atrial fibrillation Father        Past Surgical History:   Procedure Laterality Date    BREAST BIOPSY      DILATION AND CURETTAGE OF UTERUS  2003    ENDOMETRIAL ABLATION  11/2009    OOPHORECTOMY Right 04/2012    Robotic assisted laparoscopic RSO, excision endometriosis, elevated        Social History     Socioeconomic History    Marital status: /Civil Union     Spouse name: Not on file    Number of children: Not on file    Years of education: Not on file    Highest education level: Not on file   Occupational History    Not on file   Social Needs    Financial resource strain: Not on file    Food insecurity:     Worry: Not on file     Inability: Not on file    Transportation needs:     Medical: Not on file     Non-medical: Not on file   Tobacco Use    Smoking status: Former Smoker    Smokeless tobacco: Never Used   Substance and Sexual Activity    Alcohol use: Yes    Drug use: No    Sexual activity: Not on file   Lifestyle    Physical activity:     Days per week: Not on file     Minutes per session: Not on file    Stress: Not on file   Relationships    Social connections:     Talks on phone: Not on file     Gets together: Not on file     Attends Oriental orthodox service: Not on file     Active member of club or organization: Not on file     Attends meetings of clubs or organizations: Not on file     Relationship status: Not on file    Intimate partner violence:     Fear of current or ex partner: Not on file     Emotionally abused: Not on file     Physically abused: Not on file     Forced sexual activity: Not on file   Other Topics Concern    Not on file   Social History Narrative    Not on file       Review of Systems   Constitution: Positive for weight gain  Negative for chills, decreased appetite, diaphoresis, fever, malaise/fatigue, night sweats and weight loss     HENT: Negative for ear pain, hearing loss, hoarse voice, nosebleeds, sore throat and tinnitus  Eyes: Negative for blurred vision and pain  Cardiovascular: Negative  Negative for chest pain, claudication, cyanosis, dyspnea on exertion, irregular heartbeat, leg swelling, near-syncope, orthopnea, palpitations, paroxysmal nocturnal dyspnea and syncope  Respiratory: Negative for cough, hemoptysis, shortness of breath, sleep disturbances due to breathing, snoring, sputum production and wheezing  Hematologic/Lymphatic: Negative for adenopathy and bleeding problem  Does not bruise/bleed easily  Skin: Negative for color change, dry skin, flushing, itching, poor wound healing and rash  Musculoskeletal: Positive for joint pain  Negative for arthritis, back pain, falls, muscle cramps, muscle weakness, myalgias and neck pain  Gastrointestinal: Negative for abdominal pain, constipation, diarrhea, dysphagia, heartburn, hematemesis, hematochezia, melena, nausea and vomiting  Genitourinary: Negative for dysuria, frequency, hematuria, hesitancy, non-menstrual bleeding and urgency  Neurological: Negative for excessive daytime sleepiness, dizziness, focal weakness, headaches, light-headedness, loss of balance, numbness, paresthesias, tremors and vertigo  Psychiatric/Behavioral: Negative for altered mental status, depression and memory loss  The patient does not have insomnia and is not nervous/anxious  Allergic/Immunologic: Positive for environmental allergies  Negative for persistent infections  Vitals: /68 (BP Location: Left arm, Patient Position: Sitting, Cuff Size: Large)   Pulse 57   Ht 5' 5" (1 651 m)   Wt 94 kg (207 lb 3 2 oz)   BMI 34 48 kg/m²       Physical Exam:     GEN: Alert and oriented x 3, in no acute distress  Well appearing and well nourished  HEENT: Sclera anicteric, conjunctivae pink, mucous membranes moist  Oropharynx clear  NECK: Supple, no carotid bruits, no significant JVD  Trachea midline, no thyromegaly     HEART: Regular rhythm, mildly bradycardic, normal S1 and S2, no murmurs, clicks, gallops or rubs  PMI nondisplaced, no thrills  LUNGS: Clear to auscultation bilaterally; no wheezes, rales, or rhonchi  No increased work of breathing or signs of respiratory distress  ABDOMEN: Soft, nontender, nondistended, normoactive bowel sounds  EXTREMITIES: Skin warm and well perfused, no clubbing, cyanosis, or edema  NEURO: No focal findings  Normal gait  Normal speech  Mood and affect normal    SKIN: Normal without suspicious lesions on exposed skin        Lab Results:       No results found for: HGBA1C  No results found for: CHOL  Lab Results   Component Value Date    HDL 82 (H) 04/14/2017     Lab Results   Component Value Date    LDLCALC 138 (H) 04/14/2017     Lab Results   Component Value Date    TRIG 45 04/14/2017     No results found for: CHOLHDL

## 2019-02-11 ENCOUNTER — OFFICE VISIT (OUTPATIENT)
Dept: CARDIOLOGY CLINIC | Facility: CLINIC | Age: 54
End: 2019-02-11
Payer: COMMERCIAL

## 2019-02-11 VITALS
DIASTOLIC BLOOD PRESSURE: 68 MMHG | SYSTOLIC BLOOD PRESSURE: 118 MMHG | WEIGHT: 207.2 LBS | HEART RATE: 57 BPM | HEIGHT: 65 IN | BODY MASS INDEX: 34.52 KG/M2

## 2019-02-11 DIAGNOSIS — I47.1 PSVT (PAROXYSMAL SUPRAVENTRICULAR TACHYCARDIA) (HCC): Primary | ICD-10-CM

## 2019-02-11 DIAGNOSIS — I47.1 PAROXYSMAL SVT (SUPRAVENTRICULAR TACHYCARDIA) (HCC): ICD-10-CM

## 2019-02-11 DIAGNOSIS — R00.2 PALPITATION: ICD-10-CM

## 2019-02-11 DIAGNOSIS — I49.1 PAC (PREMATURE ATRIAL CONTRACTION): ICD-10-CM

## 2019-02-11 DIAGNOSIS — E78.2 MIXED DYSLIPIDEMIA: ICD-10-CM

## 2019-02-11 PROCEDURE — 93000 ELECTROCARDIOGRAM COMPLETE: CPT | Performed by: INTERNAL MEDICINE

## 2019-02-11 PROCEDURE — 99214 OFFICE O/P EST MOD 30 MIN: CPT | Performed by: INTERNAL MEDICINE

## 2019-02-11 RX ORDER — DILTIAZEM HYDROCHLORIDE 120 MG/1
120 CAPSULE, COATED, EXTENDED RELEASE ORAL DAILY
Qty: 90 CAPSULE | Refills: 3 | Status: SHIPPED | OUTPATIENT
Start: 2019-02-11 | End: 2020-02-06

## 2019-03-11 ENCOUNTER — HOSPITAL ENCOUNTER (OUTPATIENT)
Dept: RADIOLOGY | Age: 54
Discharge: HOME | End: 2019-03-11
Payer: COMMERCIAL

## 2019-03-11 VITALS — BODY MASS INDEX: 34.49 KG/M2 | WEIGHT: 207 LBS | HEIGHT: 65 IN

## 2019-03-11 DIAGNOSIS — Z12.39 BREAST CANCER SCREENING: ICD-10-CM

## 2019-03-11 PROCEDURE — 77063 BREAST TOMOSYNTHESIS BI: CPT

## 2019-03-11 PROCEDURE — 77067 SCR MAMMO BI INCL CAD: CPT

## 2019-05-21 DIAGNOSIS — K58.9 IRRITABLE BOWEL SYNDROME WITHOUT DIARRHEA: Primary | ICD-10-CM

## 2019-05-21 RX ORDER — HYOSCYAMINE SULFATE EXTENDED-RELEASE 0.38 MG/1
0.38 TABLET ORAL 2 TIMES DAILY
Qty: 60 TABLET | Refills: 5 | Status: SHIPPED | OUTPATIENT
Start: 2019-05-21 | End: 2020-11-16

## 2019-10-04 ENCOUNTER — OFFICE VISIT (OUTPATIENT)
Dept: FAMILY MEDICINE CLINIC | Facility: CLINIC | Age: 54
End: 2019-10-04
Payer: COMMERCIAL

## 2019-10-04 VITALS
HEIGHT: 65 IN | WEIGHT: 206 LBS | SYSTOLIC BLOOD PRESSURE: 120 MMHG | TEMPERATURE: 98.6 F | HEART RATE: 70 BPM | DIASTOLIC BLOOD PRESSURE: 82 MMHG | BODY MASS INDEX: 34.32 KG/M2

## 2019-10-04 DIAGNOSIS — J01.90 ACUTE SINUSITIS, RECURRENCE NOT SPECIFIED, UNSPECIFIED LOCATION: Primary | ICD-10-CM

## 2019-10-04 PROCEDURE — 3008F BODY MASS INDEX DOCD: CPT | Performed by: FAMILY MEDICINE

## 2019-10-04 PROCEDURE — 99213 OFFICE O/P EST LOW 20 MIN: CPT | Performed by: FAMILY MEDICINE

## 2019-10-04 RX ORDER — BROMPHENIRAMINE MALEATE, PSEUDOEPHEDRINE HYDROCHLORIDE, AND DEXTROMETHORPHAN HYDROBROMIDE 2; 30; 10 MG/5ML; MG/5ML; MG/5ML
10 SYRUP ORAL 4 TIMES DAILY PRN
Qty: 180 ML | Refills: 0 | Status: SHIPPED | OUTPATIENT
Start: 2019-10-04 | End: 2020-02-18 | Stop reason: CLARIF

## 2019-10-04 RX ORDER — AZITHROMYCIN 250 MG/1
TABLET, FILM COATED ORAL
Qty: 6 TABLET | Refills: 0 | Status: SHIPPED | OUTPATIENT
Start: 2019-10-04 | End: 2019-10-08

## 2019-10-04 NOTE — PROGRESS NOTES
BMI Counseling: Body mass index is 34 28 kg/m²  The BMI is above normal  Nutrition recommendations include reducing portion sizes  Patient ID: Qing Turcios is a 49 y o  female  HPI: 49 y  o female presenting with symptoms of sinus pain,pressure, nasal congestion, pnd dry cough , ear and throat pain  SUBJECTIVE    Family History   Problem Relation Age of Onset   Ferrell Cancer Mother    Ferrell Stroke Mother     Dementia Mother    Ferrell Breast cancer Mother 59    Arrhythmia Father     Hypertension Father     Atrial fibrillation Father     Melanoma Sister 50        with mets     Social History     Socioeconomic History    Marital status: /Civil Union     Spouse name: Not on file    Number of children: Not on file    Years of education: Not on file    Highest education level: Not on file   Occupational History    Not on file   Social Needs    Financial resource strain: Not on file    Food insecurity:     Worry: Not on file     Inability: Not on file    Transportation needs:     Medical: Not on file     Non-medical: Not on file   Tobacco Use    Smoking status: Former Smoker    Smokeless tobacco: Never Used   Substance and Sexual Activity    Alcohol use:  Yes    Drug use: No    Sexual activity: Not on file   Lifestyle    Physical activity:     Days per week: Not on file     Minutes per session: Not on file    Stress: Not on file   Relationships    Social connections:     Talks on phone: Not on file     Gets together: Not on file     Attends Yazidism service: Not on file     Active member of club or organization: Not on file     Attends meetings of clubs or organizations: Not on file     Relationship status: Not on file    Intimate partner violence:     Fear of current or ex partner: Not on file     Emotionally abused: Not on file     Physically abused: Not on file     Forced sexual activity: Not on file   Other Topics Concern    Not on file   Social History Narrative    Not on file     Past Medical History:   Diagnosis Date    Hypercholesterolemia     Palpitations     Paroxysmal A-fib ([address] )     Per pt, she has tachycardia not A-fib on 7-10-18     Past Surgical History:   Procedure Laterality Date    BREAST BIOPSY Right 03/03/2003    stereotactic core bx    DILATION AND CURETTAGE OF UTERUS  2003    ENDOMETRIAL ABLATION  11/2009    OOPHORECTOMY Right 04/2012    Robotic assisted laparoscopic RSO, excision endometriosis, elevated      No Known Allergies    Current Outpatient Medications:     Calcium Carb-Cholecalciferol (CALCIUM 1000 + D) 1000-800 MG-UNIT TABS, Take 1 tablet by mouth daily, Disp: , Rfl:     diltiazem (CARTIA XT) 120 mg 24 hr capsule, Take 1 capsule (120 mg total) by mouth daily, Disp: 90 capsule, Rfl: 3    hyoscyamine (LEVBID) 0 375 mg 12 hr tablet, Take 1 tablet (0 375 mg total) by mouth 2 (two) times a day, Disp: 60 tablet, Rfl: 5    Magnesium 100 MG CAPS, Take by mouth, Disp: , Rfl:     MULTIPLE VITAMINS PO, Take by mouth, Disp: , Rfl:     Omega-3 Fatty Acids (FISH OIL) 645 MG CAPS, Take 1 capsule by mouth 4 (four) times a day, Disp: , Rfl:     valACYclovir (VALTREX) 1,000 mg tablet, Take 1 tablet by mouth 3 (three) times a day as needed, Disp: , Rfl:     azithromycin (ZITHROMAX) 250 mg tablet, Take 2 tablets today then 1 tablet daily x 4 days, Disp: 6 tablet, Rfl: 0    brompheniramine-pseudoephedrine-DM 30-2-10 MG/5ML syrup, Take 10 mL by mouth 4 (four) times a day as needed for congestion or cough, Disp: 180 mL, Rfl: 0    Review of Systems  Constitutional:     Denies fever, chills, fatigue, weakness ,weight loss, weight gain      ENT: Denies earache, loss of hearing, nosebleed, nasal discharge,but complains of nasal congestion, sore throat,hoarseness and sinus pain and pressure    Pulmonary: Denies shortness of breath ,cough , dyspnea on exertionon, orthopnea ,+ PND   Cardiovascular:  Denies bradycardia , tachycardia ,palpations, lower extremity, edema leg, claudication  Breast:  Denies new or changing breast lumps,  nipple discharge, nipple changes,  Abdomen:  Denies abdominal pain , anorexia ,indigestion, nausea ,vomiting, constipation , diarrhea  Musculoskeletal: Denies myalgias, arthralgias, joint swelling, joint stiffness ,limb pain, limb swelling  Lymph:+ swollen glands  Gu: no dysuria or urinary frequency  Skin: Denies skin rash, skin lesion, skin wound, itching,dry skin  Neuro: Denies headache, numbness, tingling, confusion, loss of consciousness, dizziness ,vertigo  Psychiatric: Denies feelings of depression, suicidal ideation, anxiety, sleep disturbances    OBJECTIVE  /82   Pulse 70   Temp 98 6 °F (37 °C)   Ht 5' 5" (1 651 m)   Wt 93 4 kg (206 lb)   BMI 34 28 kg/m²   Constitutional:   NAD, well appearing and well nourished      ENT:   Conjunctiva and lids: no injection, edema, or discharge    Pupils and iris: BELEM bilaterally   External inspection of ears and nose: normal without deformities or discharge  Otoscopic exam: Canals patent ; tm are dull, with with erythem and effusions  ENasal mucosa, septum and turbinates: Turbinae injection with discharge   Oropharynx:  Moist mucosa, normal tongue and tonsils without lesions  Erythema and injection  of post pharynx with pnd      Pulmonary:Respiratory effort normal rate and rhythm, no increased work of breathing   Auscultation of lungs:  Clear bilaterally with no adventitious breath sounds       Cardiovascular: regular rate and rhythm, S1 and S2, no murmur, no edema and/or varicosities of LE      Abdomen: Soft and non-distended    Positive bowel sounds    No heptomegaly or splenomegaly    Lymphatic: Anterior  cervical lymphadenopathy         Muscskeletal:  Gait and station: Normal gait     Digits and nails normal without clubbing or cyanosis     Inspection/palpation of joints, bones, and muscles:  No joint tenderness, swelling, full active and passive range of motion      Gu: no suprabubic tenderness, CVA tenderness or urethral discharge  Skin: Normal skin turgor and no rashes    Neuro:    Normal reflexes   Psych:   alert and oriented to person, place and time  normal mood and affect      Assessment/Plan:Diagnoses and all orders for this visit:    Acute sinusitis, recurrence not specified, unspecified location  -     brompheniramine-pseudoephedrine-DM 30-2-10 MG/5ML syrup; Take 10 mL by mouth 4 (four) times a day as needed for congestion or cough  -     azithromycin (ZITHROMAX) 250 mg tablet; Take 2 tablets today then 1 tablet daily x 4 days        Reviewed with patient plan to treat with above plan      Patient instructed to call in 72 hours if not feeling better or if symptoms worsen

## 2019-10-07 ENCOUNTER — ANNUAL EXAM (OUTPATIENT)
Dept: OBGYN CLINIC | Facility: CLINIC | Age: 54
End: 2019-10-07
Payer: COMMERCIAL

## 2019-10-07 VITALS
HEIGHT: 65 IN | WEIGHT: 206.6 LBS | SYSTOLIC BLOOD PRESSURE: 122 MMHG | BODY MASS INDEX: 34.42 KG/M2 | DIASTOLIC BLOOD PRESSURE: 76 MMHG

## 2019-10-07 DIAGNOSIS — Z12.39 BREAST CANCER SCREENING: ICD-10-CM

## 2019-10-07 DIAGNOSIS — Z01.419 ENCOUNTER FOR ANNUAL ROUTINE GYNECOLOGICAL EXAMINATION: Primary | ICD-10-CM

## 2019-10-07 PROCEDURE — S0612 ANNUAL GYNECOLOGICAL EXAMINA: HCPCS | Performed by: OBSTETRICS & GYNECOLOGY

## 2019-10-07 NOTE — PROGRESS NOTES
Assessment/Plan:    Pap smear deferred due to low risk status  Encouraged self-breast examination as well as calcium supplementation  Continue annual mammogram   Reviewed jeremiah menopausal symptoms  She will continue to follow-up with her primary care physician as scheduled  Return to office in 1 year or p r n  No problem-specific Assessment & Plan notes found for this encounter  Diagnoses and all orders for this visit:    Encounter for annual routine gynecological examination    Breast cancer screening  -     Mammo screening bilateral w 3d & cad; Future          Subjective:      Patient ID: Krystal Lewis is a 49 y o  female  HPI     This is a 27-year-old female P3 ( x3, age 33, 26, 26) presents for annual gyn exam   Her last menstrual cycle was 2019  She does get hot flashes which are tolerable since the cool weather has come  She denies any changes in bowel or bladder function  She has been in a monogamous relationship with her  for the last 31 years  Her gyn history significant for endometrial ablation  followed by laparoscopic right salpingo-oophorectomy in  for endometriosis  She does have a history of irritable bowel syndrome  She had a colonoscopy at age 53  She did the colo guard test this year which was normal   She does follow up with her family physician on an annual basis  Her youngest child will be graduating from college in May  Her oldest will be [facility] early next [facility]  Her middle child is employed as a part-time   The following portions of the patient's history were reviewed and updated as appropriate: allergies, current medications, past family history, past medical history, past social history, past surgical history and problem list     Review of Systems   Constitutional: Negative for fatigue, fever and unexpected weight change  Respiratory: Negative for cough, chest tightness, shortness of breath and wheezing  Cardiovascular: Negative  Negative for chest pain and palpitations  Gastrointestinal: Negative  Negative for abdominal distention, abdominal pain, blood in stool, constipation, diarrhea, nausea and vomiting  Genitourinary: Negative  Negative for difficulty urinating, dyspareunia, dysuria, flank pain, frequency, genital sores, hematuria, pelvic pain, urgency, vaginal bleeding, vaginal discharge and vaginal pain  Skin: Negative for rash  Objective:      /76   Ht 5' 5" (1 651 m)   Wt 93 7 kg (206 lb 9 6 oz)   LMP  (Exact Date)   Breastfeeding? No   BMI 34 38 kg/m²          Physical Exam   Constitutional: She appears well-developed and well-nourished  Cardiovascular: Normal rate and regular rhythm  Pulmonary/Chest: Effort normal and breath sounds normal  Right breast exhibits no inverted nipple, no mass, no nipple discharge, no skin change and no tenderness  Left breast exhibits no inverted nipple, no mass, no nipple discharge, no skin change and no tenderness  Abdominal: Soft  Bowel sounds are normal  She exhibits no distension  There is no tenderness  There is no rebound and no guarding  Genitourinary: Vagina normal and uterus normal  There is no lesion on the right labia  There is no lesion on the left labia  Cervix exhibits no discharge and no friability  Right adnexum displays no mass, no tenderness and no fullness  Left adnexum displays no mass, no tenderness and no fullness  No vaginal discharge found  Genitourinary Comments: The vagina is evident of slight estrogen deficiency  There is no evidence of pelvic floor prolapse  Rectovaginal exam is confirmatory

## 2019-12-11 ENCOUNTER — OFFICE VISIT (OUTPATIENT)
Dept: FAMILY MEDICINE CLINIC | Facility: CLINIC | Age: 54
End: 2019-12-11
Payer: COMMERCIAL

## 2019-12-11 VITALS
WEIGHT: 214.8 LBS | HEART RATE: 68 BPM | DIASTOLIC BLOOD PRESSURE: 62 MMHG | SYSTOLIC BLOOD PRESSURE: 120 MMHG | BODY MASS INDEX: 35.79 KG/M2 | HEIGHT: 65 IN | TEMPERATURE: 98.5 F

## 2019-12-11 DIAGNOSIS — H00.14 CHALAZION OF LEFT UPPER EYELID: Primary | ICD-10-CM

## 2019-12-11 PROCEDURE — 1036F TOBACCO NON-USER: CPT | Performed by: FAMILY MEDICINE

## 2019-12-11 PROCEDURE — 99213 OFFICE O/P EST LOW 20 MIN: CPT | Performed by: FAMILY MEDICINE

## 2019-12-11 PROCEDURE — 3008F BODY MASS INDEX DOCD: CPT | Performed by: FAMILY MEDICINE

## 2019-12-11 RX ORDER — CEPHALEXIN 500 MG/1
500 CAPSULE ORAL EVERY 8 HOURS SCHEDULED
Qty: 30 CAPSULE | Refills: 0 | Status: SHIPPED | OUTPATIENT
Start: 2019-12-11 | End: 2019-12-21

## 2019-12-11 NOTE — PROGRESS NOTES
Patient ID: Jose Donahue is a 49 y o  female  HPI: 49 y  o female presenting with a red, painful lump on her left upper eyelid  She denies any blurred vision or drainage from her eye  SUBJECTIVE    Family History   Problem Relation Age of Onset   Ada Godfrey Cancer Mother    Ada Godfrey Stroke Mother     Dementia Mother    Ada Godfrey Breast cancer Mother 59    Arrhythmia Father     Hypertension Father     Atrial fibrillation Father     Melanoma Sister 50        with mets     Social History     Socioeconomic History    Marital status: /Civil Union     Spouse name: Not on file    Number of children: Not on file    Years of education: Not on file    Highest education level: Not on file   Occupational History    Not on file   Social Needs    Financial resource strain: Not on file    Food insecurity:     Worry: Not on file     Inability: Not on file    Transportation needs:     Medical: Not on file     Non-medical: Not on file   Tobacco Use    Smoking status: Former Smoker    Smokeless tobacco: Never Used   Substance and Sexual Activity    Alcohol use:  Yes    Drug use: No    Sexual activity: Not on file   Lifestyle    Physical activity:     Days per week: Not on file     Minutes per session: Not on file    Stress: Not on file   Relationships    Social connections:     Talks on phone: Not on file     Gets together: Not on file     Attends Holiness service: Not on file     Active member of club or organization: Not on file     Attends meetings of clubs or organizations: Not on file     Relationship status: Not on file    Intimate partner violence:     Fear of current or ex partner: Not on file     Emotionally abused: Not on file     Physically abused: Not on file     Forced sexual activity: Not on file   Other Topics Concern    Not on file   Social History Narrative    Not on file     Past Medical History:   Diagnosis Date    Hypercholesterolemia     Palpitations     Paroxysmal A-fib ([address] )     Per pt, she has tachycardia not A-fib on 7-10-18     Past Surgical History:   Procedure Laterality Date    BREAST BIOPSY Right 03/03/2003    stereotactic core bx    DILATION AND CURETTAGE OF UTERUS  2003    ENDOMETRIAL ABLATION  11/2009    OOPHORECTOMY Right 04/2012    Robotic assisted laparoscopic RSO, excision endometriosis, elevated      No Known Allergies    Current Outpatient Medications:     Calcium Carb-Cholecalciferol (CALCIUM 1000 + D) 1000-800 MG-UNIT TABS, Take 1 tablet by mouth daily, Disp: , Rfl:     diltiazem (CARTIA XT) 120 mg 24 hr capsule, Take 1 capsule (120 mg total) by mouth daily, Disp: 90 capsule, Rfl: 3    hyoscyamine (LEVBID) 0 375 mg 12 hr tablet, Take 1 tablet (0 375 mg total) by mouth 2 (two) times a day, Disp: 60 tablet, Rfl: 5    Magnesium 100 MG CAPS, Take by mouth, Disp: , Rfl:     MULTIPLE VITAMINS PO, Take by mouth, Disp: , Rfl:     Omega-3 Fatty Acids (FISH OIL) 645 MG CAPS, Take 1 capsule by mouth 4 (four) times a day, Disp: , Rfl:     valACYclovir (VALTREX) 1,000 mg tablet, Take 1 tablet by mouth 3 (three) times a day as needed, Disp: , Rfl:     brompheniramine-pseudoephedrine-DM 30-2-10 MG/5ML syrup, Take 10 mL by mouth 4 (four) times a day as needed for congestion or cough (Patient not taking: Reported on 12/11/2019), Disp: 180 mL, Rfl: 0    cephalexin (KEFLEX) 500 mg capsule, Take 1 capsule (500 mg total) by mouth every 8 (eight) hours for 10 days, Disp: 30 capsule, Rfl: 0    Review of Systems  Constitutional:     Denies fever, chills ,fatigue ,weakness ,weight loss, weight gain     ENT: Denies earache ,loss of hearing ,nosebleed, nasal discharge,nasal congestion ,sore throat ,hoarseness + red , painful bump on left upper eyelid  Pulmonary: Denies shortness of breath ,cough  ,dyspnea on exertion, orthopnea  ,PND   Cardiovascular:  Denies bradycardia , tachycardia  ,palpations, lower extremity edema leg, claudication  Breast:  Denies new or changing breast lumps ,nipple discharge ,nipple changes  Abdomen:  Denies abdominal pain , anorexia , indigestion, nausea, vomiting, constipation, diarrhea  Musculoskeletal: Denies myalgias, arthralgias, joint swelling, joint stiffness , limb pain, limb swelling  Gu: denies dysuria, polyuria  Skin: Denies skin rash, skin lesion, skin wound, itching, dry skin  Neuro: Denies headache, numbness, tingling, confusion, loss of consciousness, dizziness, vertigo  Psychiatric: Denies feelings of depression, suicidal ideation, anxiety, sleep disturbances    OBJECTIVE  /62   Pulse 68   Temp 98 5 °F (36 9 °C)   Ht 5' 5" (1 651 m)   Wt 97 4 kg (214 lb 12 8 oz)   LMP 02/28/2019 (Exact Date)   BMI 35 74 kg/m²   Constitutional:   NAD, well appearing and well nourished      ENT:   Conjunctiva and lids: left upper eyelid papule ; no conjunctival injection and sclera white   Pupils and iris: BELEM bilaterally    External inspection of ears and nose: normal without deformities or discharge  Otoscopic exam: Canals patent without erythema  Nasal mucosa, septum and turbinates: Normal or edema or discharge         Oropharynx:  Moist mucosa, normal tongue and tonsils without lesions  No erythema        Pulmonary:Respiratory effort normal rate and rhythm, no increased work of breathing   Auscultation of lungs:  Clear bilaterally with no adventitious breath sounds       Cardiovascular: regular rate and rhythm, S1 and S2, no murmur, no edema and/or varicosities of LE      Abdomen: Soft and non-distended     Positive bowel sounds      No heptomegaly or splenomegaly      Gu: no suprapubic tenderness or CVA tenderness, no urethral discharge  Lymphatic:  No anterior or posterior cervical lymphadenopathy         Musculoskeletal:  Gait and station: Normal gait      Digits and nails normal without clubbing or cyanosis       Inspection/palpation of joints, bones, and muscles:  No joint tenderness, swelling, full active and passive range of motion       Skin: Normal skin turgor and no rashes      Neuro:       Normal reflexes      Psych:   alert and oriented to person, place and time     normal mood and affect       Assessment/Plan:Diagnoses and all orders for this visit:    Chalazion of left upper eyelid  -     cephalexin (KEFLEX) 500 mg capsule; Take 1 capsule (500 mg total) by mouth every 8 (eight) hours for 10 days        Reviewed with patient plan to treat with above plan      Patient instructed to call in 72 hours if not feeling better or if symptoms worsen

## 2020-02-06 DIAGNOSIS — I47.1 PAROXYSMAL SVT (SUPRAVENTRICULAR TACHYCARDIA) (HCC): ICD-10-CM

## 2020-02-06 RX ORDER — DILTIAZEM HYDROCHLORIDE 120 MG/1
CAPSULE, EXTENDED RELEASE ORAL
Qty: 90 CAPSULE | Refills: 3 | Status: SHIPPED | OUTPATIENT
Start: 2020-02-06 | End: 2021-02-17 | Stop reason: SDUPTHER

## 2020-02-17 NOTE — PROGRESS NOTES
Cardiology Follow up Note    Swapna Cordova 49 y o  female MRN: [id number]    02/18/20          Assessment/Plan:    1  Paroxysmal SVT/PACs  Echo 7/2/14 showed normal LV size and function, EF 60%, no RWMA, normal diastolic function, normal RV size and function, no significant valvular heart disease  She had 30 day event monitor from 6/23-7/22/14, which showed sinus rhythm  When patient felt fluttering, EKG showed sinus rhythm with PACs  She did have an episode of SVT on 7/14/14 while exercising, maximum  bpm  Diltiazem 120 was started in 2014 with symptomatic improvement  Holter monitor was done 12/17 for increased palpitations, which showed average HR 61 BPM, minimum HR 42 BPM, and maximum  BPM  3412 supraventricular ectopic beats, majority of which were PACs, 11 atrial couplets  Longest R-R interval was 1 7 seconds  Palpitations mostly correlated with PACs  Currently reports palpitations mostly controlled on Diltiazem 120, relatively rare currently, maybe less than once a month  2  Dyslipidemia  Lipid panel 5/27/14 showed total cholesterol 228, , TG 78, HDL 62  She is currently taking fish oil, which was started after the lipid panel results  Lipid panel 4/17 showed total cholesterol 229, , TG 45, HDL 82  She is taking red yeast rice and fish oil  She has script from Dr Antonia Romo to recheck, she knows she needs to get it rechecked  She reports she had it tested as part of giving blood, she was told total cholesterol was around 250 she believes, so she is hesitant to get it officially checked  She is now exercising to help get it better controlled  3  LE edema  Mild, improves with elevation, and unchanged from previously continue to monitor  1  PSVT (paroxysmal supraventricular tachycardia) ([address] )     2  PAC (premature atrial contraction)     3  Hypercholesteremia     4  Mixed dyslipidemia     5   Palpitation         HPI:     49 y o  woman with a reported history of paroxysmal SVT, who is here for follow up  She states she was diagnosed with paroxysmal SVT around 2000, which was diagnosed with event monitor, which was done for palpitations  Normally the episodes convert on their own within minutes, but in 2014 she had two episodes that lasted longer than 10 minutes  She has tried bearing down without success  She does have a family history of atrial fibrillation in her father and uncle  She has been tried on beta blockers in the past, but developed Raynaud's phenomenon and was unable to tolerate it  Holter monitor 5/19/14 showed sinus rhythm, avg HR 71 bpm, minimum HR 41 bpm, maximum  bpm  There were isolated PVCs and PACs, rare atrial couplets, but no supraventricular tachycardia  When the patient reported possible starting of a tachycardia, Holter showed sinus tachycardia,  bpm, which occurred during exercise, with an atrial couplet  Echo 7/2/14 showed normal LV size and function, EF 60%, no RWMA, normal diastolic function, normal RV size and function, no significant valvular heart disease  She had 30 day event monitor from 6/23-7/22/14, which showed sinus rhythm  When patient felt fluttering, EKG showed sinus rhythm with PACs  She did have an episode of SVT on 7/14/14 while exercising, maximum  bpm      I had started her on Diltiazem 120 in 2014, and she had noted reduction in palpitations  Holter monitor was done 12/17 for increased palpitations, which showed average HR 61 BPM, minimum HR 42 BPM, and maximum  BPM  3412 supraventricular ectopic beats, majority of which were PACs, 11 atrial couplets  Longest R-R interval was 1 7 seconds  Palpitations mostly correlated with PACs  I had recommended she consider increasing Diltiazem from 120 to 240 if she needed to symptomatically  She reports she never increased the Diltiazem, because by the time Holter results were available the palpitations had improved on their own       Currently reports palpitations are rare, less than monthly  Longest episode lasted about 20 minutes, occurred during spin class, but it resolved when she went in shower  She normally exercises via step classes and spin classes, also swims, denies any significant chest pain or significant shortness of breath with exertion  Has not had period since April 2019  Having hot flashes  She gets mild ankle edema, left greater than right  Usually improves with elevation, not severe, chronic and stable  No orthopnea, uses 1 pillow to sleep, no PND  No dizziness or lightheadedness        Patient Active Problem List   Diagnosis    Hypercholesteremia    IBS (irritable bowel syndrome)    Menorrhagia with irregular cycle    Diverticulitis    Cyst of uterus    Corneal abrasion    Ovarian cyst    PSVT (paroxysmal supraventricular tachycardia) (HCC)    Seasonal allergies    PAC (premature atrial contraction)    Mixed dyslipidemia    Palpitation    Achilles tendinitis of right lower extremity       No Known Allergies      Current Outpatient Medications:     Calcium Carb-Cholecalciferol (CALCIUM 1000 + D) 1000-800 MG-UNIT TABS, Take 1 tablet by mouth daily, Disp: , Rfl:     CARTIA  MG 24 hr capsule, TAKE 1 CAPSULE DAILY, Disp: 90 capsule, Rfl: 3    LYSINE PO, Take by mouth daily, Disp: , Rfl:     Magnesium 100 MG CAPS, Take by mouth, Disp: , Rfl:     MULTIPLE VITAMINS PO, Take by mouth, Disp: , Rfl:     Omega-3 Fatty Acids (FISH OIL) 645 MG CAPS, Take 1 capsule by mouth 2 (two) times a day , Disp: , Rfl:     hyoscyamine (LEVBID) 0 375 mg 12 hr tablet, Take 1 tablet (0 375 mg total) by mouth 2 (two) times a day (Patient not taking: Reported on 2/18/2020), Disp: 60 tablet, Rfl: 5    valACYclovir (VALTREX) 1,000 mg tablet, Take 1 tablet by mouth 3 (three) times a day as needed, Disp: , Rfl:     Past Medical History:   Diagnosis Date    Hypercholesterolemia     Palpitations     Paroxysmal SVT (supraventricular tachycardia) (HCC) Family History   Problem Relation Age of Onset   Senthil Garcia Cancer Mother     Stroke Mother     Dementia Mother    Senthil Garcia Breast cancer Mother 59    Arrhythmia Father     Hypertension Father     Atrial fibrillation Father     Melanoma Sister 50        with mets       Past Surgical History:   Procedure Laterality Date    BREAST BIOPSY Right 03/03/2003    stereotactic core bx    DILATION AND CURETTAGE OF UTERUS  2003    ENDOMETRIAL ABLATION  11/2009    OOPHORECTOMY Right 04/2012    Robotic assisted laparoscopic RSO, excision endometriosis, elevated        Social History     Socioeconomic History    Marital status: /Civil Union     Spouse name: Not on file    Number of children: Not on file    Years of education: Not on file    Highest education level: Not on file   Occupational History    Not on file   Social Needs    Financial resource strain: Not on file    Food insecurity:     Worry: Not on file     Inability: Not on file    Transportation needs:     Medical: Not on file     Non-medical: Not on file   Tobacco Use    Smoking status: Former Smoker    Smokeless tobacco: Never Used   Substance and Sexual Activity    Alcohol use:  Yes    Drug use: No    Sexual activity: Not on file   Lifestyle    Physical activity:     Days per week: Not on file     Minutes per session: Not on file    Stress: Not on file   Relationships    Social connections:     Talks on phone: Not on file     Gets together: Not on file     Attends Buddhist service: Not on file     Active member of club or organization: Not on file     Attends meetings of clubs or organizations: Not on file     Relationship status: Not on file    Intimate partner violence:     Fear of current or ex partner: Not on file     Emotionally abused: Not on file     Physically abused: Not on file     Forced sexual activity: Not on file   Other Topics Concern    Not on file   Social History Narrative    Not on file       Review of Systems Constitution: Negative for chills, decreased appetite, diaphoresis, fever, malaise/fatigue, night sweats, weight gain and weight loss  Hot flashes from menopause   HENT: Negative for ear pain, hearing loss, hoarse voice, nosebleeds, sore throat and tinnitus  Eyes: Negative for blurred vision and pain  Cardiovascular: Negative  Negative for chest pain, claudication, cyanosis, dyspnea on exertion, irregular heartbeat, leg swelling, near-syncope, orthopnea, palpitations, paroxysmal nocturnal dyspnea and syncope  Respiratory: Negative for cough, hemoptysis, shortness of breath, sleep disturbances due to breathing, snoring, sputum production and wheezing  Hematologic/Lymphatic: Negative for adenopathy and bleeding problem  Does not bruise/bleed easily  Skin: Negative for color change, dry skin, flushing, itching, poor wound healing and rash  Musculoskeletal: Positive for joint pain  Negative for arthritis, back pain, falls, muscle cramps, muscle weakness, myalgias and neck pain  Gastrointestinal: Negative for abdominal pain, constipation, diarrhea, dysphagia, heartburn, hematemesis, hematochezia, melena, nausea and vomiting  Genitourinary: Negative for dysuria, frequency, hematuria, hesitancy, non-menstrual bleeding and urgency  Neurological: Negative for excessive daytime sleepiness, dizziness, focal weakness, headaches, light-headedness, loss of balance, numbness, paresthesias, tremors, vertigo and weakness  Psychiatric/Behavioral: Negative for altered mental status, depression and memory loss  The patient does not have insomnia and is not nervous/anxious  Allergic/Immunologic: Positive for environmental allergies  Negative for persistent infections         Vitals: /72 (BP Location: Right arm, Patient Position: Sitting, Cuff Size: Large)   Pulse 58   Ht 5' 5" (1 651 m)   Wt 95 7 kg (211 lb)   LMP 02/28/2019 (Exact Date)   BMI 35 11 kg/m²       Physical Exam:     GEN: Alert and oriented x 3, in no acute distress  Well appearing and well nourished  HEENT: Sclera anicteric, conjunctivae pink, mucous membranes moist  Oropharynx clear  NECK: Supple, no carotid bruits, no significant JVD  Trachea midline, no thyromegaly  HEART: Regular rhythm, mildly bradycardic, normal S1 and S2, no murmurs, clicks, gallops or rubs  PMI nondisplaced, no thrills  LUNGS: Clear to auscultation bilaterally; no wheezes, rales, or rhonchi  No increased work of breathing or signs of respiratory distress  ABDOMEN: Obese, soft, nontender, nondistended, normoactive bowel sounds  EXTREMITIES: Skin warm and well perfused, no clubbing, cyanosis, or edema  NEURO: No focal findings  Normal gait  Normal speech  Mood and affect normal    SKIN: Normal without suspicious lesions on exposed skin        Lab Results:       No results found for: HGBA1C  No results found for: CHOL  Lab Results   Component Value Date    HDL 82 (H) 04/14/2017     Lab Results   Component Value Date    LDLCALC 138 (H) 04/14/2017     Lab Results   Component Value Date    TRIG 45 04/14/2017     No results found for: CHOLHDL

## 2020-02-18 ENCOUNTER — OFFICE VISIT (OUTPATIENT)
Dept: CARDIOLOGY CLINIC | Facility: CLINIC | Age: 55
End: 2020-02-18
Payer: COMMERCIAL

## 2020-02-18 VITALS
HEIGHT: 65 IN | WEIGHT: 211 LBS | BODY MASS INDEX: 35.16 KG/M2 | HEART RATE: 58 BPM | SYSTOLIC BLOOD PRESSURE: 108 MMHG | DIASTOLIC BLOOD PRESSURE: 72 MMHG

## 2020-02-18 DIAGNOSIS — E78.2 MIXED DYSLIPIDEMIA: ICD-10-CM

## 2020-02-18 DIAGNOSIS — R00.2 PALPITATION: ICD-10-CM

## 2020-02-18 DIAGNOSIS — I49.1 PAC (PREMATURE ATRIAL CONTRACTION): ICD-10-CM

## 2020-02-18 DIAGNOSIS — E78.00 HYPERCHOLESTEREMIA: ICD-10-CM

## 2020-02-18 DIAGNOSIS — I47.1 PSVT (PAROXYSMAL SUPRAVENTRICULAR TACHYCARDIA) (HCC): Primary | ICD-10-CM

## 2020-02-18 PROCEDURE — 3078F DIAST BP <80 MM HG: CPT | Performed by: INTERNAL MEDICINE

## 2020-02-18 PROCEDURE — 1036F TOBACCO NON-USER: CPT | Performed by: INTERNAL MEDICINE

## 2020-02-18 PROCEDURE — 3008F BODY MASS INDEX DOCD: CPT | Performed by: INTERNAL MEDICINE

## 2020-02-18 PROCEDURE — 99214 OFFICE O/P EST MOD 30 MIN: CPT | Performed by: INTERNAL MEDICINE

## 2020-02-18 PROCEDURE — 3074F SYST BP LT 130 MM HG: CPT | Performed by: INTERNAL MEDICINE

## 2020-09-25 ENCOUNTER — TELEPHONE (OUTPATIENT)
Dept: OBGYN CLINIC | Facility: CLINIC | Age: 55
End: 2020-09-25

## 2020-09-25 DIAGNOSIS — N95.0 POST-MENOPAUSAL BLEEDING: Primary | ICD-10-CM

## 2020-09-25 NOTE — TELEPHONE ENCOUNTER
Patient has not had period in 17 months and started with spotting    Do you want her to have u/s prior to appt or visit here first?

## 2020-09-28 NOTE — TELEPHONE ENCOUNTER
Recommend pelvic ultrasound followed by office visit for endometrial biopsy, take NSAIDs prior to office visit

## 2020-09-28 NOTE — TELEPHONE ENCOUNTER
Pt informed of KA's recom - she will schedule appt for pelvic U/S ([facility])    Also scheduled appt for EMB with pre-instr given

## 2020-09-30 ENCOUNTER — HOSPITAL ENCOUNTER (OUTPATIENT)
Dept: RADIOLOGY | Age: 55
Discharge: HOME | End: 2020-09-30
Payer: COMMERCIAL

## 2020-09-30 VITALS — HEIGHT: 65 IN | BODY MASS INDEX: 34.99 KG/M2 | WEIGHT: 210 LBS

## 2020-09-30 DIAGNOSIS — Z12.31 ENCOUNTER FOR SCREENING MAMMOGRAM FOR MALIGNANT NEOPLASM OF BREAST: ICD-10-CM

## 2020-09-30 PROCEDURE — 77067 SCR MAMMO BI INCL CAD: CPT

## 2020-09-30 PROCEDURE — 77063 BREAST TOMOSYNTHESIS BI: CPT

## 2020-10-12 ENCOUNTER — APPOINTMENT (OUTPATIENT)
Dept: RADIOLOGY | Age: 55
End: 2020-10-12
Attending: FAMILY MEDICINE
Payer: COMMERCIAL

## 2020-10-12 ENCOUNTER — OFFICE VISIT (OUTPATIENT)
Dept: URGENT CARE | Age: 55
End: 2020-10-12
Payer: COMMERCIAL

## 2020-10-12 ENCOUNTER — APPOINTMENT (OUTPATIENT)
Dept: URGENT CARE | Age: 55
End: 2020-10-12
Attending: FAMILY MEDICINE
Payer: COMMERCIAL

## 2020-10-12 ENCOUNTER — HOSPITAL ENCOUNTER (OUTPATIENT)
Dept: RADIOLOGY | Age: 55
Discharge: HOME | End: 2020-10-12
Payer: COMMERCIAL

## 2020-10-12 VITALS
DIASTOLIC BLOOD PRESSURE: 58 MMHG | OXYGEN SATURATION: 98 % | HEART RATE: 56 BPM | RESPIRATION RATE: 16 BRPM | SYSTOLIC BLOOD PRESSURE: 136 MMHG | TEMPERATURE: 97.8 F

## 2020-10-12 DIAGNOSIS — N95.0 POST-MENOPAUSAL BLEEDING: ICD-10-CM

## 2020-10-12 DIAGNOSIS — S99.921A INJURY OF TOE ON RIGHT FOOT, INITIAL ENCOUNTER: ICD-10-CM

## 2020-10-12 DIAGNOSIS — S89.91XA INJURY OF RIGHT KNEE, INITIAL ENCOUNTER: ICD-10-CM

## 2020-10-12 DIAGNOSIS — S99.921A INJURY OF TOE ON RIGHT FOOT, INITIAL ENCOUNTER: Primary | ICD-10-CM

## 2020-10-12 PROCEDURE — 73564 X-RAY EXAM KNEE 4 OR MORE: CPT

## 2020-10-12 PROCEDURE — G0382 LEV 3 HOSP TYPE B ED VISIT: HCPCS | Performed by: FAMILY MEDICINE

## 2020-10-12 PROCEDURE — 90715 TDAP VACCINE 7 YRS/> IM: CPT

## 2020-10-12 PROCEDURE — 73660 X-RAY EXAM OF TOE(S): CPT

## 2020-10-12 PROCEDURE — 76830 TRANSVAGINAL US NON-OB: CPT

## 2020-10-12 PROCEDURE — 90471 IMMUNIZATION ADMIN: CPT | Performed by: FAMILY MEDICINE

## 2020-10-12 PROCEDURE — S9083 URGENT CARE CENTER GLOBAL: HCPCS | Performed by: FAMILY MEDICINE

## 2020-10-12 PROCEDURE — 76856 US EXAM PELVIC COMPLETE: CPT

## 2020-10-19 ENCOUNTER — OFFICE VISIT (OUTPATIENT)
Dept: OBGYN CLINIC | Facility: HOSPITAL | Age: 55
End: 2020-10-19
Payer: COMMERCIAL

## 2020-10-19 VITALS
DIASTOLIC BLOOD PRESSURE: 82 MMHG | HEART RATE: 55 BPM | WEIGHT: 210 LBS | BODY MASS INDEX: 34.99 KG/M2 | HEIGHT: 65 IN | SYSTOLIC BLOOD PRESSURE: 117 MMHG

## 2020-10-19 DIAGNOSIS — M25.561 ACUTE PAIN OF RIGHT KNEE: Primary | ICD-10-CM

## 2020-10-19 DIAGNOSIS — S83.8X1A INJURY OF MENISCUS OF RIGHT KNEE, INITIAL ENCOUNTER: ICD-10-CM

## 2020-10-19 PROCEDURE — 99213 OFFICE O/P EST LOW 20 MIN: CPT | Performed by: PHYSICIAN ASSISTANT

## 2020-10-19 PROCEDURE — 3074F SYST BP LT 130 MM HG: CPT | Performed by: PHYSICIAN ASSISTANT

## 2020-10-19 PROCEDURE — 3079F DIAST BP 80-89 MM HG: CPT | Performed by: PHYSICIAN ASSISTANT

## 2020-10-19 PROCEDURE — 20610 DRAIN/INJ JOINT/BURSA W/O US: CPT | Performed by: PHYSICIAN ASSISTANT

## 2020-10-19 PROCEDURE — 1036F TOBACCO NON-USER: CPT | Performed by: PHYSICIAN ASSISTANT

## 2020-10-19 RX ORDER — TRIAMCINOLONE ACETONIDE 40 MG/ML
80 INJECTION, SUSPENSION INTRA-ARTICULAR; INTRAMUSCULAR
Status: COMPLETED | OUTPATIENT
Start: 2020-10-19 | End: 2020-10-19

## 2020-10-19 RX ORDER — BUPIVACAINE HYDROCHLORIDE 5 MG/ML
2 INJECTION, SOLUTION EPIDURAL; INTRACAUDAL
Status: COMPLETED | OUTPATIENT
Start: 2020-10-19 | End: 2020-10-19

## 2020-10-19 RX ORDER — LIDOCAINE HYDROCHLORIDE 10 MG/ML
2 INJECTION, SOLUTION INFILTRATION; PERINEURAL
Status: COMPLETED | OUTPATIENT
Start: 2020-10-19 | End: 2020-10-19

## 2020-10-19 RX ADMIN — LIDOCAINE HYDROCHLORIDE 2 ML: 10 INJECTION, SOLUTION INFILTRATION; PERINEURAL at 11:02

## 2020-10-19 RX ADMIN — BUPIVACAINE HYDROCHLORIDE 2 ML: 5 INJECTION, SOLUTION EPIDURAL; INTRACAUDAL at 11:02

## 2020-10-19 RX ADMIN — TRIAMCINOLONE ACETONIDE 80 MG: 40 INJECTION, SUSPENSION INTRA-ARTICULAR; INTRAMUSCULAR at 11:02

## 2020-10-23 ENCOUNTER — EVALUATION (OUTPATIENT)
Dept: PHYSICAL THERAPY | Facility: CLINIC | Age: 55
End: 2020-10-23
Payer: COMMERCIAL

## 2020-10-23 DIAGNOSIS — S86.911S: ICD-10-CM

## 2020-10-23 DIAGNOSIS — R26.9 GAIT DIFFICULTY: ICD-10-CM

## 2020-10-23 DIAGNOSIS — M25.561 ACUTE PAIN OF RIGHT KNEE: Primary | ICD-10-CM

## 2020-10-23 PROCEDURE — 97116 GAIT TRAINING THERAPY: CPT | Performed by: PHYSICAL THERAPIST

## 2020-10-23 PROCEDURE — 97110 THERAPEUTIC EXERCISES: CPT | Performed by: PHYSICAL THERAPIST

## 2020-10-23 PROCEDURE — 97112 NEUROMUSCULAR REEDUCATION: CPT | Performed by: PHYSICAL THERAPIST

## 2020-10-23 PROCEDURE — 97162 PT EVAL MOD COMPLEX 30 MIN: CPT | Performed by: PHYSICAL THERAPIST

## 2020-10-27 ENCOUNTER — OFFICE VISIT (OUTPATIENT)
Dept: PHYSICAL THERAPY | Facility: CLINIC | Age: 55
End: 2020-10-27
Payer: COMMERCIAL

## 2020-10-27 DIAGNOSIS — R26.9 GAIT DIFFICULTY: ICD-10-CM

## 2020-10-27 DIAGNOSIS — M25.561 ACUTE PAIN OF RIGHT KNEE: Primary | ICD-10-CM

## 2020-10-27 DIAGNOSIS — S86.911S: ICD-10-CM

## 2020-10-27 PROCEDURE — 97530 THERAPEUTIC ACTIVITIES: CPT | Performed by: PHYSICAL THERAPIST

## 2020-10-27 PROCEDURE — 97110 THERAPEUTIC EXERCISES: CPT | Performed by: PHYSICAL THERAPIST

## 2020-10-27 PROCEDURE — 97112 NEUROMUSCULAR REEDUCATION: CPT | Performed by: PHYSICAL THERAPIST

## 2020-10-29 ENCOUNTER — OFFICE VISIT (OUTPATIENT)
Dept: PHYSICAL THERAPY | Facility: CLINIC | Age: 55
End: 2020-10-29
Payer: COMMERCIAL

## 2020-10-29 DIAGNOSIS — M25.561 ACUTE PAIN OF RIGHT KNEE: Primary | ICD-10-CM

## 2020-10-29 DIAGNOSIS — S86.911S: ICD-10-CM

## 2020-10-29 DIAGNOSIS — R26.9 GAIT DIFFICULTY: ICD-10-CM

## 2020-10-29 PROCEDURE — 97110 THERAPEUTIC EXERCISES: CPT

## 2020-10-29 PROCEDURE — 97112 NEUROMUSCULAR REEDUCATION: CPT

## 2020-10-29 PROCEDURE — 97530 THERAPEUTIC ACTIVITIES: CPT

## 2020-11-02 ENCOUNTER — OFFICE VISIT (OUTPATIENT)
Dept: PHYSICAL THERAPY | Facility: CLINIC | Age: 55
End: 2020-11-02
Payer: COMMERCIAL

## 2020-11-02 DIAGNOSIS — R26.9 GAIT DIFFICULTY: ICD-10-CM

## 2020-11-02 DIAGNOSIS — S86.911S: ICD-10-CM

## 2020-11-02 DIAGNOSIS — M25.561 ACUTE PAIN OF RIGHT KNEE: Primary | ICD-10-CM

## 2020-11-02 PROCEDURE — 97140 MANUAL THERAPY 1/> REGIONS: CPT | Performed by: PHYSICAL THERAPIST

## 2020-11-02 PROCEDURE — 97110 THERAPEUTIC EXERCISES: CPT | Performed by: PHYSICAL THERAPIST

## 2020-11-05 ENCOUNTER — OFFICE VISIT (OUTPATIENT)
Dept: PHYSICAL THERAPY | Facility: CLINIC | Age: 55
End: 2020-11-05
Payer: COMMERCIAL

## 2020-11-05 DIAGNOSIS — M25.561 ACUTE PAIN OF RIGHT KNEE: Primary | ICD-10-CM

## 2020-11-05 DIAGNOSIS — S86.911S: ICD-10-CM

## 2020-11-05 DIAGNOSIS — R26.9 GAIT DIFFICULTY: ICD-10-CM

## 2020-11-05 PROCEDURE — 97112 NEUROMUSCULAR REEDUCATION: CPT | Performed by: PHYSICAL THERAPIST

## 2020-11-05 PROCEDURE — 97110 THERAPEUTIC EXERCISES: CPT | Performed by: PHYSICAL THERAPIST

## 2020-11-05 PROCEDURE — 97140 MANUAL THERAPY 1/> REGIONS: CPT | Performed by: PHYSICAL THERAPIST

## 2020-11-11 ENCOUNTER — OFFICE VISIT (OUTPATIENT)
Dept: PHYSICAL THERAPY | Facility: CLINIC | Age: 55
End: 2020-11-11
Payer: COMMERCIAL

## 2020-11-11 DIAGNOSIS — S86.911S: ICD-10-CM

## 2020-11-11 DIAGNOSIS — R26.9 GAIT DIFFICULTY: ICD-10-CM

## 2020-11-11 DIAGNOSIS — M25.561 ACUTE PAIN OF RIGHT KNEE: Primary | ICD-10-CM

## 2020-11-11 PROCEDURE — 97112 NEUROMUSCULAR REEDUCATION: CPT | Performed by: PHYSICAL THERAPIST

## 2020-11-11 PROCEDURE — 97140 MANUAL THERAPY 1/> REGIONS: CPT | Performed by: PHYSICAL THERAPIST

## 2020-11-12 ENCOUNTER — OFFICE VISIT (OUTPATIENT)
Dept: OBGYN CLINIC | Facility: CLINIC | Age: 55
End: 2020-11-12
Payer: COMMERCIAL

## 2020-11-12 VITALS
DIASTOLIC BLOOD PRESSURE: 79 MMHG | TEMPERATURE: 97.7 F | WEIGHT: 210 LBS | HEIGHT: 65 IN | BODY MASS INDEX: 34.99 KG/M2 | HEART RATE: 60 BPM | SYSTOLIC BLOOD PRESSURE: 117 MMHG

## 2020-11-12 DIAGNOSIS — M25.461 EFFUSION OF RIGHT KNEE: ICD-10-CM

## 2020-11-12 DIAGNOSIS — M25.561 ACUTE PAIN OF RIGHT KNEE: Primary | ICD-10-CM

## 2020-11-12 DIAGNOSIS — R29.898 KNEE CLICKING: ICD-10-CM

## 2020-11-12 PROCEDURE — 99213 OFFICE O/P EST LOW 20 MIN: CPT | Performed by: ORTHOPAEDIC SURGERY

## 2020-11-12 PROCEDURE — 1036F TOBACCO NON-USER: CPT | Performed by: ORTHOPAEDIC SURGERY

## 2020-11-12 PROCEDURE — 3078F DIAST BP <80 MM HG: CPT | Performed by: ORTHOPAEDIC SURGERY

## 2020-11-12 PROCEDURE — 3074F SYST BP LT 130 MM HG: CPT | Performed by: ORTHOPAEDIC SURGERY

## 2020-11-16 ENCOUNTER — ANNUAL EXAM (OUTPATIENT)
Dept: OBGYN CLINIC | Facility: CLINIC | Age: 55
End: 2020-11-16
Payer: COMMERCIAL

## 2020-11-16 VITALS
TEMPERATURE: 97.2 F | WEIGHT: 210 LBS | BODY MASS INDEX: 34.99 KG/M2 | SYSTOLIC BLOOD PRESSURE: 124 MMHG | HEIGHT: 65 IN | DIASTOLIC BLOOD PRESSURE: 90 MMHG

## 2020-11-16 DIAGNOSIS — Z01.419 ENCOUNTER FOR ANNUAL ROUTINE GYNECOLOGICAL EXAMINATION: Primary | ICD-10-CM

## 2020-11-16 DIAGNOSIS — Z12.39 ENCOUNTER FOR SCREENING FOR MALIGNANT NEOPLASM OF BREAST, UNSPECIFIED SCREENING MODALITY: ICD-10-CM

## 2020-11-16 PROCEDURE — S0612 ANNUAL GYNECOLOGICAL EXAMINA: HCPCS | Performed by: OBSTETRICS & GYNECOLOGY

## 2020-11-16 PROCEDURE — G0145 SCR C/V CYTO,THINLAYER,RESCR: HCPCS | Performed by: OBSTETRICS & GYNECOLOGY

## 2020-11-18 ENCOUNTER — HOSPITAL ENCOUNTER (OUTPATIENT)
Dept: MRI IMAGING | Facility: HOSPITAL | Age: 55
Discharge: HOME | End: 2020-11-18
Attending: ORTHOPAEDIC SURGERY
Payer: COMMERCIAL

## 2020-11-18 DIAGNOSIS — R29.898 KNEE CLICKING: ICD-10-CM

## 2020-11-18 DIAGNOSIS — M25.461 EFFUSION OF RIGHT KNEE: ICD-10-CM

## 2020-11-18 DIAGNOSIS — M25.561 ACUTE PAIN OF RIGHT KNEE: ICD-10-CM

## 2020-11-18 PROCEDURE — G1004 CDSM NDSC: HCPCS

## 2020-11-18 PROCEDURE — 73721 MRI JNT OF LWR EXTRE W/O DYE: CPT

## 2020-11-19 LAB
LAB AP GYN PRIMARY INTERPRETATION: NORMAL
Lab: NORMAL

## 2020-11-23 ENCOUNTER — TELEPHONE (OUTPATIENT)
Dept: OBGYN CLINIC | Facility: HOSPITAL | Age: 55
End: 2020-11-23

## 2020-11-25 ENCOUNTER — PROCEDURE VISIT (OUTPATIENT)
Dept: OBGYN CLINIC | Facility: CLINIC | Age: 55
End: 2020-11-25
Payer: COMMERCIAL

## 2020-11-25 VITALS
DIASTOLIC BLOOD PRESSURE: 82 MMHG | WEIGHT: 210.8 LBS | SYSTOLIC BLOOD PRESSURE: 124 MMHG | BODY MASS INDEX: 35.12 KG/M2 | HEIGHT: 65 IN

## 2020-11-25 DIAGNOSIS — N95.0 POSTMENOPAUSAL BLEEDING: Primary | ICD-10-CM

## 2020-11-25 PROCEDURE — 88305 TISSUE EXAM BY PATHOLOGIST: CPT | Performed by: PATHOLOGY

## 2020-11-25 PROCEDURE — 58100 BIOPSY OF UTERUS LINING: CPT | Performed by: OBSTETRICS & GYNECOLOGY

## 2020-11-25 PROCEDURE — 3008F BODY MASS INDEX DOCD: CPT | Performed by: ORTHOPAEDIC SURGERY

## 2020-12-01 ENCOUNTER — TELEPHONE (OUTPATIENT)
Dept: OBGYN CLINIC | Facility: CLINIC | Age: 55
End: 2020-12-01

## 2020-12-03 ENCOUNTER — OFFICE VISIT (OUTPATIENT)
Dept: OBGYN CLINIC | Facility: CLINIC | Age: 55
End: 2020-12-03
Payer: COMMERCIAL

## 2020-12-03 VITALS
BODY MASS INDEX: 34.99 KG/M2 | DIASTOLIC BLOOD PRESSURE: 84 MMHG | HEIGHT: 65 IN | WEIGHT: 210 LBS | HEART RATE: 71 BPM | SYSTOLIC BLOOD PRESSURE: 128 MMHG

## 2020-12-03 DIAGNOSIS — S72.491A CLOSED OSTEOCHONDRAL FRACTURE OF DISTAL END OF RIGHT FEMUR, INITIAL ENCOUNTER (HCC): ICD-10-CM

## 2020-12-03 DIAGNOSIS — S83.241D ACUTE MEDIAL MENISCUS TEAR, RIGHT, SUBSEQUENT ENCOUNTER: ICD-10-CM

## 2020-12-03 DIAGNOSIS — M17.11 PRIMARY OSTEOARTHRITIS OF RIGHT KNEE: ICD-10-CM

## 2020-12-03 DIAGNOSIS — M25.561 ACUTE PAIN OF RIGHT KNEE: Primary | ICD-10-CM

## 2020-12-03 PROCEDURE — 99213 OFFICE O/P EST LOW 20 MIN: CPT | Performed by: ORTHOPAEDIC SURGERY

## 2020-12-03 RX ORDER — MELOXICAM 7.5 MG/1
7.5 TABLET ORAL DAILY
Qty: 20 TABLET | Refills: 0 | Status: SHIPPED | OUTPATIENT
Start: 2020-12-03 | End: 2021-04-15 | Stop reason: ALTCHOICE

## 2020-12-15 ENCOUNTER — OFFICE VISIT (OUTPATIENT)
Dept: OBGYN CLINIC | Facility: CLINIC | Age: 55
End: 2020-12-15
Payer: COMMERCIAL

## 2020-12-15 VITALS
DIASTOLIC BLOOD PRESSURE: 84 MMHG | BODY MASS INDEX: 34.99 KG/M2 | SYSTOLIC BLOOD PRESSURE: 127 MMHG | HEIGHT: 65 IN | WEIGHT: 210 LBS | HEART RATE: 66 BPM

## 2020-12-15 DIAGNOSIS — S72.491A CLOSED OSTEOCHONDRAL FRACTURE OF DISTAL END OF RIGHT FEMUR, INITIAL ENCOUNTER (HCC): Primary | ICD-10-CM

## 2020-12-15 PROCEDURE — 99243 OFF/OP CNSLTJ NEW/EST LOW 30: CPT | Performed by: ORTHOPAEDIC SURGERY

## 2020-12-15 PROCEDURE — 1036F TOBACCO NON-USER: CPT | Performed by: ORTHOPAEDIC SURGERY

## 2020-12-15 PROCEDURE — 3074F SYST BP LT 130 MM HG: CPT | Performed by: ORTHOPAEDIC SURGERY

## 2020-12-15 PROCEDURE — 3079F DIAST BP 80-89 MM HG: CPT | Performed by: ORTHOPAEDIC SURGERY

## 2020-12-15 PROCEDURE — 3008F BODY MASS INDEX DOCD: CPT | Performed by: ORTHOPAEDIC SURGERY

## 2020-12-29 ENCOUNTER — EVALUATION (OUTPATIENT)
Dept: PHYSICAL THERAPY | Facility: CLINIC | Age: 55
End: 2020-12-29
Payer: COMMERCIAL

## 2020-12-29 DIAGNOSIS — M23.306 MENISCUS DEGENERATION, RIGHT: ICD-10-CM

## 2020-12-29 DIAGNOSIS — M25.561 RIGHT KNEE PAIN, UNSPECIFIED CHRONICITY: Primary | ICD-10-CM

## 2020-12-29 PROCEDURE — 97110 THERAPEUTIC EXERCISES: CPT | Performed by: PHYSICAL THERAPIST

## 2020-12-29 PROCEDURE — 97140 MANUAL THERAPY 1/> REGIONS: CPT | Performed by: PHYSICAL THERAPIST

## 2020-12-29 PROCEDURE — 97112 NEUROMUSCULAR REEDUCATION: CPT | Performed by: PHYSICAL THERAPIST

## 2021-01-05 ENCOUNTER — OFFICE VISIT (OUTPATIENT)
Dept: PHYSICAL THERAPY | Facility: CLINIC | Age: 56
End: 2021-01-05
Payer: COMMERCIAL

## 2021-01-05 DIAGNOSIS — M25.561 RIGHT KNEE PAIN, UNSPECIFIED CHRONICITY: Primary | ICD-10-CM

## 2021-01-05 DIAGNOSIS — M23.306 MENISCUS DEGENERATION, RIGHT: ICD-10-CM

## 2021-01-05 PROCEDURE — 97112 NEUROMUSCULAR REEDUCATION: CPT | Performed by: PHYSICAL THERAPIST

## 2021-01-05 PROCEDURE — 97140 MANUAL THERAPY 1/> REGIONS: CPT | Performed by: PHYSICAL THERAPIST

## 2021-01-05 PROCEDURE — 97110 THERAPEUTIC EXERCISES: CPT | Performed by: PHYSICAL THERAPIST

## 2021-01-05 NOTE — PROGRESS NOTES
Daily Note     Today's date: 2021  Patient name: Eduarda Goldsmith  : 1965  MRN: [id number]  Referring provider: Janeen Hernández  Dx:   Encounter Diagnosis     ICD-10-CM    1  Right knee pain, unspecified chronicity  M25 561    2  Meniscus degeneration, right  M23 306        Start Time: 0800  Stop Time: 0840  Total time in clinic (min): 40 minutes    Subjective: No new complaints today  The patient reports improvement in symptoms since previous session  Objective: See treatment diary below      Assessment: The PT progressed the patient's workout program during today's treatment  New exercises were added to the patient's current home program, a hard copy with tracking chart was provided, and all exercises were practiced in-session to promote appropriate performance  The patient tolerated manual and active treatment well today  The patient would benefit from further skilled PT services as she continues to experience pain with prolonged activity, and still demonstrates some weakness in her R LE  Plan: Continue per plan of care        Precautions: Cardiac arrhythmia      Manuals 10/23 10/27 10/29 11/2 11/5  12/29 1/5/21     Knee med/lat MWM     SRB   SRB     Patellar glides all planes (gr II-III)     SRB   SRB     Tibiofemoral joints A-P glides all planes (gr II-III)     SRB   SRB     Tibiofemoral joint distraction      SRB   SRB     Neuro Re-Ed             Quad set 5 sec x10, HEP 5 sec x10 w/ SLR 2# 2x10 w/ SLR 2# 2x10 w/ SLR         Standing SLR 3 ways   RTB 10 ea ea BL 2x10 ea GTB, HEP    Steamboats, 10 slow+10 fast GTB, HEP     Side stepping   NV x3 laps GTB, HEP    x3 laps GTB, HEP                                                         Ther Ex             Active warmup  Upright bike x5 min Upright bike 5 min Upright bike 8 min Upright bike 5 min   TM x6 min     Supine hamstring stretch 10 sec x10, HEP            Sidelying hip abduction  2x15 ea LE, HEP 2# 2x10 ea          Single leg bridge  2x15 ea LE, HEP 2x10 ea          Leg press   70# 3x10 80# 3x10         TKE @ [place]   15# 15x5"          Retro-walking @ [place]   15# x10 laps          Calf raises @ Leg press    80# 3x10         Lunges w/ slider    2x15 ea         Lateral lunges w/ slider    2x15 ea         Lateral step-down        2x20 4", HEP     Ther Activity             Step ups  6" 2x15 ea LE, HEP 6" 2x15 ea          Heel raises  Up 2, down 1 x10 ea LE, HEP Up 2, down 1 x10 ea           Gait Training             Stair ascent/descent education x5 min            Assistive device training/modification x5 min            Modalities                                       Assessment IE    Re-assessment of joint integrity  RE, FOTO      Education Prognosis, HEP POC UHEP     UPOC, Exam findings

## 2021-01-12 ENCOUNTER — OFFICE VISIT (OUTPATIENT)
Dept: PHYSICAL THERAPY | Facility: CLINIC | Age: 56
End: 2021-01-12
Payer: COMMERCIAL

## 2021-01-12 DIAGNOSIS — M23.306 MENISCUS DEGENERATION, RIGHT: ICD-10-CM

## 2021-01-12 DIAGNOSIS — M25.561 RIGHT KNEE PAIN, UNSPECIFIED CHRONICITY: Primary | ICD-10-CM

## 2021-01-12 PROCEDURE — 97112 NEUROMUSCULAR REEDUCATION: CPT | Performed by: PHYSICAL THERAPIST

## 2021-01-12 PROCEDURE — 97110 THERAPEUTIC EXERCISES: CPT | Performed by: PHYSICAL THERAPIST

## 2021-01-12 PROCEDURE — 97140 MANUAL THERAPY 1/> REGIONS: CPT | Performed by: PHYSICAL THERAPIST

## 2021-01-12 NOTE — PROGRESS NOTES
Daily Note     Today's date: 2021  Patient name: Myesha Landrum  : 1965  MRN: [id number]  Referring provider: Neil Brito  Dx:   Encounter Diagnosis     ICD-10-CM    1  Right knee pain, unspecified chronicity  M25 561    2  Meniscus degeneration, right  M23 306        Start Time: 0806  Stop Time: 0845  Total time in clinic (min): 39 minutes    Subjective: No new complaints today  The patient reports improvement in symptoms since previous session, but notes that her home exercise program was difficult to complete consistently        Objective: See treatment diary below      Assessment: The PT further progressed the patient's home exercise program during today's treatment  This was chosen in an effort to maximize the patient's ability to recover at home due to her financial requirements for attending PT  Lunges, step-downs, and single-leg deadlifts were added to the patient's current program to promote further advanced resistance training and proprioceptive control  The patient tolerated manual and active treatment well today  The patient would benefit from further skilled PT services in the form of check-in sessions to ensure appropriate progress  Plan: Continue per plan of care        Precautions: Cardiac arrhythmia      Manuals 10/23 10/27 10/29 11/2 11/5  12/29 1/5/21 1/12    Knee med/lat MWM     SRB   SRB SRB    Patellar glides all planes (gr II-III)     SRB   SRB SRB    Tibiofemoral joints A-P glides all planes (gr II-III)     SRB   SRB SRB    Tibiofemoral joint distraction      SRB   SRB SRB    Neuro Re-Ed             Quad set 5 sec x10, HEP 5 sec x10 w/ SLR 2# 2x10 w/ SLR 2# 2x10 w/ SLR         Standing SLR 3 ways   RTB 10 ea ea BL 2x10 ea GTB, HEP    Steamboats, 10 slow+10 fast GTB, HEP     Side stepping   NV x3 laps GTB, HEP    x3 laps GTB, HEP     Single leg deadlift         3x10 10# db, HEP                                           Ther Ex             Active warmup  Upright bike x5 min Upright bike 5 min Upright bike 8 min Upright bike 5 min   TM x6 min [place] x5 min    Supine hamstring stretch 10 sec x10, HEP            Sidelying hip abduction  2x15 ea LE, HEP 2# 2x10 ea          Single leg bridge  2x15 ea LE, HEP 2x10 ea          Leg press   70# 3x10 80# 3x10         TKE @ [place]   15# 15x5"          Retro-walking @ [place]   15# x10 laps          Calf raises @ Leg press    80# 3x10         Lunges w/ slider    2x15 ea         Lateral lunges w/ slider    2x15 ea         Lateral step-down        2x20 4", HEP     Prone knee ext against band         3x10, HEP    Lunges         3x10, HEP                              Ther Activity             Step ups  6" 2x15 ea LE, HEP 6" 2x15 ea          Heel raises  Up 2, down 1 x10 ea LE, HEP Up 2, down 1 x10 ea           Forward step-down          2x20, HEP                                           Gait Training             Stair ascent/descent education x5 min            Assistive device training/modification x5 min            Modalities                                       Assessment IE    Re-assessment of joint integrity  RE, FOTO      Education Prognosis, HEP POC UHEP     UPOC, Exam findings

## 2021-01-19 ENCOUNTER — APPOINTMENT (OUTPATIENT)
Dept: PHYSICAL THERAPY | Facility: CLINIC | Age: 56
End: 2021-01-19
Payer: COMMERCIAL

## 2021-02-03 ENCOUNTER — TELEPHONE (OUTPATIENT)
Dept: FAMILY MEDICINE CLINIC | Facility: CLINIC | Age: 56
End: 2021-02-03

## 2021-02-03 NOTE — TELEPHONE ENCOUNTER
Patient called  She is seeing you for annual exam on 04/09 (weather reschedule)  She feels that she is behind on her cholesterol screening and wants to know if you will order labs for [facility]   She uses a [facility]

## 2021-02-05 DIAGNOSIS — K58.9 IRRITABLE BOWEL SYNDROME, UNSPECIFIED TYPE: Primary | ICD-10-CM

## 2021-02-05 DIAGNOSIS — E78.00 HYPERCHOLESTEREMIA: ICD-10-CM

## 2021-02-16 ENCOUNTER — APPOINTMENT (OUTPATIENT)
Dept: PHYSICAL THERAPY | Facility: CLINIC | Age: 56
End: 2021-02-16
Payer: COMMERCIAL

## 2021-02-17 ENCOUNTER — OFFICE VISIT (OUTPATIENT)
Dept: CARDIOLOGY CLINIC | Facility: CLINIC | Age: 56
End: 2021-02-17
Payer: COMMERCIAL

## 2021-02-17 ENCOUNTER — OFFICE VISIT (OUTPATIENT)
Dept: PHYSICAL THERAPY | Facility: CLINIC | Age: 56
End: 2021-02-17
Payer: COMMERCIAL

## 2021-02-17 VITALS
HEIGHT: 65 IN | OXYGEN SATURATION: 98 % | HEART RATE: 70 BPM | BODY MASS INDEX: 35.75 KG/M2 | WEIGHT: 214.6 LBS | SYSTOLIC BLOOD PRESSURE: 108 MMHG | DIASTOLIC BLOOD PRESSURE: 76 MMHG

## 2021-02-17 DIAGNOSIS — M23.306 OTHER MENISCUS DERANGEMENTS, UNSPECIFIED MENISCUS, RIGHT KNEE: ICD-10-CM

## 2021-02-17 DIAGNOSIS — I47.1 PSVT (PAROXYSMAL SUPRAVENTRICULAR TACHYCARDIA) (HCC): ICD-10-CM

## 2021-02-17 DIAGNOSIS — E78.5 DYSLIPIDEMIA: ICD-10-CM

## 2021-02-17 DIAGNOSIS — G89.29 CHRONIC PAIN OF RIGHT KNEE: Primary | ICD-10-CM

## 2021-02-17 DIAGNOSIS — M25.561 CHRONIC PAIN OF RIGHT KNEE: Primary | ICD-10-CM

## 2021-02-17 DIAGNOSIS — I49.1 PAC (PREMATURE ATRIAL CONTRACTION): ICD-10-CM

## 2021-02-17 PROCEDURE — 97140 MANUAL THERAPY 1/> REGIONS: CPT | Performed by: PHYSICAL THERAPIST

## 2021-02-17 PROCEDURE — 97112 NEUROMUSCULAR REEDUCATION: CPT | Performed by: PHYSICAL THERAPIST

## 2021-02-17 PROCEDURE — 3008F BODY MASS INDEX DOCD: CPT | Performed by: INTERNAL MEDICINE

## 2021-02-17 PROCEDURE — 93000 ELECTROCARDIOGRAM COMPLETE: CPT | Performed by: INTERNAL MEDICINE

## 2021-02-17 PROCEDURE — 99214 OFFICE O/P EST MOD 30 MIN: CPT | Performed by: INTERNAL MEDICINE

## 2021-02-17 PROCEDURE — 1036F TOBACCO NON-USER: CPT | Performed by: INTERNAL MEDICINE

## 2021-02-17 RX ORDER — DILTIAZEM HYDROCHLORIDE 120 MG/1
120 CAPSULE, COATED, EXTENDED RELEASE ORAL DAILY
Qty: 90 CAPSULE | Refills: 3 | Status: SHIPPED | OUTPATIENT
Start: 2021-02-17 | End: 2022-01-26

## 2021-02-17 NOTE — PROGRESS NOTES
Cardiology Follow Up    Jose Donahue  1965  [id number]  [facility]  [address]  [address]  [place] [address] [phone]    1  PSVT (paroxysmal supraventricular tachycardia) (HCC)  POCT ECG    diltiazem (Cartia XT) 120 mg 24 hr capsule   2  PAC (premature atrial contraction)  POCT ECG   3  Dyslipidemia  Lipid Panel With Direct LDL    TSH, 3rd generation with Free T4 reflex       Discussion/Summary:  Ms Melly Miller is a pleasant 81-year-old female who presents to the office today for routine follow-up  Since her last visit she has been feeling relatively well  She has relatively infrequent symptoms of her PSVT and PACs  She is maintained on diltiazem  No changes were made to her medication regimen  She had an echocardiogram a few years ago which was unremarkable  This does not need to be repeated  Otherwise her blood pressure is well controlled  I have no recent assessment of her lipids  She did undergo a lipid panel a few years ago with a suboptimal LDL  She is scheduled to undergo reassessment in the near future by her primary care provider and I will await the results and make further recommendations based on those labs  No testing is advised besides blood work  I will see her back in the office in one year or sooner if deemed necessary  Interval History:  Ms Melly Miller is a pleasant 81-year-old female who presents to the office today for routine follow-up  In the fall she had been very active  She was walking on a regular basis and participating in spin class  She has been sedentary due to joint issues  With the activity she performs she denies any cardiopulmonary symptoms of chest pain or shortness of breath    She denies any signs or symptoms of congestive heart failure including increasing lower extremity edema, paroxysmal nocturnal dyspnea, orthopnea, acute weight gain or increasing abdominal girth  She denies lightheadedness, syncope or presyncope  She denies symptoms of claudication  She will get infrequent symptoms of fluttering  This occurs if she has alcohol or is dehydrated  She notes this sometimes when she bends over  The symptoms are transient lasting seconds and resolve spontaneously without any specific intervention  She denies any sustained palpitations      Problem List     Hypercholesteremia    IBS (irritable bowel syndrome)    Menorrhagia with irregular cycle    Diverticulitis    Cyst of uterus    Corneal abrasion    Ovarian cyst    PSVT (paroxysmal supraventricular tachycardia) (HCC)    Seasonal allergies    PAC (premature atrial contraction)    Mixed dyslipidemia    Palpitation    Achilles tendinitis of right lower extremity    Acute medial meniscus tear, right, subsequent encounter    Acute pain of right knee        Past Medical History:   Diagnosis Date    Hypercholesterolemia     Palpitations     Paroxysmal SVT (supraventricular tachycardia) (HCC)      Social History     Socioeconomic History    Marital status: /Civil Union     Spouse name: Not on file    Number of children: Not on file    Years of education: Not on file    Highest education level: Not on file   Occupational History    Not on file   Social Needs    Financial resource strain: Not on file    Food insecurity     Worry: Not on file     Inability: Not on file   [employer] needs     Medical: Not on file     Non-medical: Not on file   Tobacco Use    Smoking status: Former Smoker    Smokeless tobacco: Never Used    Tobacco comment: quit in 25s    Substance and Sexual Activity    Alcohol use: Yes     Frequency: 2-3 times a week    Drug use: No    Sexual activity: Not on file   Lifestyle    Physical activity     Days per week: Not on file     Minutes per session: Not on file    Stress: Not on file   Relationships    Social connections     Talks on phone: Not on file Gets together: Not on file     Attends Tenriism service: Not on file     Active member of club or organization: Not on file     Attends meetings of clubs or organizations: Not on file     Relationship status: Not on file    Intimate partner violence     Fear of current or ex partner: Not on file     Emotionally abused: Not on file     Physically abused: Not on file     Forced sexual activity: Not on file   Other Topics Concern    Not on file   Social History Narrative    Not on file      Family History   Problem Relation Age of Onset    Cancer Mother     Stroke Mother     Dementia Mother     Breast cancer Mother 59    Hyperlipidemia Mother     Arrhythmia Father     Hypertension Father     Atrial fibrillation Father     Melanoma Sister 50        with mets    No Known Problems Daughter     No Known Problems Maternal Grandmother     No Known Problems Maternal Grandfather     No Known Problems Paternal Grandmother     No Known Problems Paternal Grandfather     No Known Problems Daughter     No Known Problems Paternal Aunt      Past Surgical History:   Procedure Laterality Date    BREAST BIOPSY Right 03/03/2003    stereotactic core bx    DILATION AND CURETTAGE OF UTERUS  2003    ENDOMETRIAL ABLATION  11/2009    OOPHORECTOMY Right 04/2012    Robotic assisted laparoscopic RSO, excision endometriosis, elevated        Current Outpatient Medications:     Calcium Carb-Cholecalciferol (CALCIUM 1000 + D) 1000-800 MG-UNIT TABS, Take 1 tablet by mouth daily, Disp: , Rfl:     diltiazem (Cartia XT) 120 mg 24 hr capsule, Take 1 capsule (120 mg total) by mouth daily, Disp: 90 capsule, Rfl: 3    Magnesium 100 MG CAPS, Take by mouth, Disp: , Rfl:     meloxicam (MOBIC) 7 5 mg tablet, Take 1 tablet (7 5 mg total) by mouth daily, Disp: 20 tablet, Rfl: 0    MULTIPLE VITAMINS PO, Take by mouth, Disp: , Rfl:     Omega-3 Fatty Acids (FISH OIL) 645 MG CAPS, Take 1 capsule by mouth 2 (two) times a day , Disp: , Rfl:     valACYclovir (VALTREX) 1,000 mg tablet, Take 1 tablet by mouth 3 (three) times a day as needed, Disp: , Rfl:   No Known Allergies    Labs:     Chemistry        Component Value Date/Time    K 4 4 04/14/2017 0716     04/14/2017 0716    CO2 27 04/14/2017 0716    BUN 13 04/14/2017 0716    CREATININE 0 85 04/14/2017 0716        Component Value Date/Time    CALCIUM 8 4 04/14/2017 0716    ALKPHOS 63 04/14/2017 0716    AST 14 04/14/2017 0716    ALT 21 04/14/2017 0716            No results found for: CHOL  Lab Results   Component Value Date    HDL 82 (H) 04/14/2017     Lab Results   Component Value Date    LDLCALC 138 (H) 04/14/2017     Lab Results   Component Value Date    TRIG 45 04/14/2017     No results found for: CHOLHDL    Imaging: No results found  ECG:  Normal sinus rhythm, rightward axis      Review of Systems   Cardiovascular: Positive for irregular heartbeat and palpitations  Negative for chest pain, claudication, cyanosis, leg swelling and near-syncope  All other systems reviewed and are negative  Vitals:    02/17/21 1309   BP: 108/76   Pulse: 70   SpO2: 98%     Vitals:    02/17/21 1309   Weight: 97 3 kg (214 lb 9 6 oz)     Height: 5' 5" (165 1 cm)   Body mass index is 35 71 kg/m²      Physical Exam:   General appearance:  Appears stated age, alert, well appearing and in no distress  HEENT:  PERRLA, EOMI, no scleral icterus, no conjunctival pallor  NECK:  Supple, No elevated JVP, no thyromegaly, no carotid bruits  HEART:  Regular rate and rhythm, normal S1/S2, no S3/S4, no murmur or rub  LUNGS:  Clear to auscultation bilaterally  ABDOMEN:  Soft, non-tender, positive bowel sounds, no rebound or guarding, no organomegaly   EXTREMITIES:  No edema  VASCULAR:  Normal pedal pulses   SKIN: No lesions or rashes on exposed skin  NEURO:  CN II-XII intact, no focal deficits

## 2021-02-17 NOTE — PROGRESS NOTES
Daily Note     Today's date: 2021  Patient name: Sasha Loredo  : 1965  MRN: [id number]  Referring provider: Jazmyn Rogers  Dx:   Encounter Diagnosis     ICD-10-CM    1  Chronic pain of right knee  M25 561     G89 29    2  Other meniscus derangements, unspecified meniscus, right knee  M23 306        Start Time: 09  Stop Time: 1015  Total time in clinic (min): 40 minutes    Subjective: No new complaints today  The patient reports slow improvement in symptoms since previous session, but notes that step-downs and unilateral strengthening are both challenging  Objective: See treatment diary below      Assessment: The PT modified the patient's current HEP to emphasize increased strength and further control during today's treatment  Patellar taping was added today to promote anterior knee symptom modification, and was successful at modifying symptoms  The patient tolerated manual and active treatment well today  The patient would benefit from further skilled PT services to continue to follow-up and advance as tolerated  Plan: Continue per plan of care        Precautions: Cardiac arrhythmia      Manuals 10/23 10/27 10/29 11/2 11/5  12/29 1/5/21 1/12 2/17   Knee med/lat MWM     SRB   SRB SRB SRB   Patellar glides all planes (gr II-III)     SRB   SRB SRB SRB   Tibiofemoral joints A-P glides all planes (gr II-III)     SRB   SRB SRB SRB   Tibiofemoral joint distraction      SRB   SRB SRB                 Kinesiotaping patellar "donut"          SRB                             Neuro Re-Ed             Quad set 5 sec x10, HEP 5 sec x10 w/ SLR 2# 2x10 w/ SLR 2# 2x10 w/ SLR      Add SLR + heel slide for hip flexor    Standing SLR 3 ways   RTB 10 ea ea BL 2x10 ea GTB, HEP    Steamboats, 10 slow+10 fast GTB, HEP  Add resistance loop   Side stepping   NV x3 laps GTB, HEP    x3 laps GTB, HEP     Single leg deadlift         3x10 10# db, HEP Review + coaching Ther Ex             Active warmup  Upright bike x5 min Upright bike 5 min Upright bike 8 min Upright bike 5 min   TM x6 min [place] x5 min NuStep x8 min for max knee flex   Supine hamstring stretch 10 sec x10, HEP            Sidelying hip abduction  2x15 ea LE, HEP 2# 2x10 ea       SLR boxes   Single leg bridge  2x15 ea LE, HEP 2x10 ea          Leg press   70# 3x10 80# 3x10         TKE @ [place]   15# 15x5"          Retro-walking @ [place]   15# x10 laps          Calf raises @ Leg press    80# 3x10         Lunges w/ slider    2x15 ea         Lateral lunges w/ slider    2x15 ea         Lateral step-down        2x20 4", HEP     Prone knee ext against band         3x10, HEP Black TB today   Lunges         3x10, HEP                              Ther Activity             Step ups  6" 2x15 ea LE, HEP 6" 2x15 ea          Heel raises  Up 2, down 1 x10 ea LE, HEP Up 2, down 1 x10 ea           Forward step-down          2x20, HEP Coaching, knee taping                                          Gait Training             Stair ascent/descent education x5 min            Assistive device training/modification x5 min            Modalities                                       Assessment IE    Re-assessment of joint integrity  RE, FOTO      Education Prognosis, HEP POC UHEP     UPOC, Exam findings   HEP review and modification, body mechanics observation and training

## 2021-02-25 ENCOUNTER — LAB (OUTPATIENT)
Dept: LAB | Facility: CLINIC | Age: 56
End: 2021-02-25
Payer: COMMERCIAL

## 2021-02-25 DIAGNOSIS — E78.5 DYSLIPIDEMIA: ICD-10-CM

## 2021-02-25 DIAGNOSIS — K58.9 IRRITABLE BOWEL SYNDROME, UNSPECIFIED TYPE: ICD-10-CM

## 2021-02-25 DIAGNOSIS — E78.00 HYPERCHOLESTEREMIA: ICD-10-CM

## 2021-02-25 LAB
ALBUMIN SERPL BCP-MCNC: 3.6 G/DL (ref 3.5–5)
ALP SERPL-CCNC: 94 U/L (ref 46–116)
ALT SERPL W P-5'-P-CCNC: 22 U/L (ref 12–78)
ANION GAP SERPL CALCULATED.3IONS-SCNC: 3 MMOL/L (ref 4–13)
AST SERPL W P-5'-P-CCNC: 14 U/L (ref 5–45)
BILIRUB SERPL-MCNC: 0.41 MG/DL (ref 0.2–1)
BUN SERPL-MCNC: 12 MG/DL (ref 5–25)
CALCIUM SERPL-MCNC: 9.7 MG/DL (ref 8.3–10.1)
CHLORIDE SERPL-SCNC: 112 MMOL/L (ref 100–108)
CHOLEST SERPL-MCNC: 249 MG/DL (ref 50–200)
CO2 SERPL-SCNC: 28 MMOL/L (ref 21–32)
CREAT SERPL-MCNC: 0.8 MG/DL (ref 0.6–1.3)
GFR SERPL CREATININE-BSD FRML MDRD: 84 ML/MIN/1.73SQ M
GLUCOSE P FAST SERPL-MCNC: 84 MG/DL (ref 65–99)
HDLC SERPL-MCNC: 66 MG/DL
LDLC SERPL CALC-MCNC: 168 MG/DL (ref 0–100)
POTASSIUM SERPL-SCNC: 4.5 MMOL/L (ref 3.5–5.3)
PROT SERPL-MCNC: 7 G/DL (ref 6.4–8.2)
SODIUM SERPL-SCNC: 143 MMOL/L (ref 136–145)
TRIGL SERPL-MCNC: 75 MG/DL
TSH SERPL DL<=0.05 MIU/L-ACNC: 3.16 UIU/ML (ref 0.36–3.74)

## 2021-02-25 PROCEDURE — 80053 COMPREHEN METABOLIC PANEL: CPT

## 2021-02-25 PROCEDURE — 84443 ASSAY THYROID STIM HORMONE: CPT | Performed by: INTERNAL MEDICINE

## 2021-02-25 PROCEDURE — 80061 LIPID PANEL: CPT

## 2021-02-25 PROCEDURE — 36415 COLL VENOUS BLD VENIPUNCTURE: CPT

## 2021-03-03 ENCOUNTER — OFFICE VISIT (OUTPATIENT)
Dept: PHYSICAL THERAPY | Facility: CLINIC | Age: 56
End: 2021-03-03
Payer: COMMERCIAL

## 2021-03-03 DIAGNOSIS — M25.561 CHRONIC PAIN OF RIGHT KNEE: Primary | ICD-10-CM

## 2021-03-03 DIAGNOSIS — G89.29 CHRONIC PAIN OF RIGHT KNEE: Primary | ICD-10-CM

## 2021-03-03 DIAGNOSIS — M23.306 OTHER MENISCUS DERANGEMENTS, UNSPECIFIED MENISCUS, RIGHT KNEE: ICD-10-CM

## 2021-03-03 PROCEDURE — 97140 MANUAL THERAPY 1/> REGIONS: CPT | Performed by: PHYSICAL THERAPIST

## 2021-03-03 PROCEDURE — 97110 THERAPEUTIC EXERCISES: CPT | Performed by: PHYSICAL THERAPIST

## 2021-03-03 PROCEDURE — 97530 THERAPEUTIC ACTIVITIES: CPT | Performed by: PHYSICAL THERAPIST

## 2021-03-03 PROCEDURE — 97112 NEUROMUSCULAR REEDUCATION: CPT | Performed by: PHYSICAL THERAPIST

## 2021-03-03 NOTE — PROGRESS NOTES
Progress Note     Today's date: 3/3/2021  Patient name: Mirela Mederos  : 1965  MRN: [id number]  Referring provider: Yany Mai  Dx:   Encounter Diagnosis     ICD-10-CM    1  Chronic pain of right knee  M25 561     G89 29    2  Other meniscus derangements, unspecified meniscus, right knee  M23 306        Start Time: 0845  Stop Time: 09  Total time in clinic (min): 35 minutes    Subjective: No new complaints today  The patient reports improvement in symptoms since previous session  The patient reports that she has been able to perform more exercise at home and at the gym without any pain, and is also experiencing less symptoms with daily activities such as stair descent  The patient reports that she did still experience discomfort while attempting to go snow-shoeing but her symptoms did not linger following this activity  Objective: See treatment diary below      Assessment: The PT reviewed the current home training program during today's treatment  Short-seated knee extension with resistance was added to the patient's current program to provide increased load to the knee extensor mechanism  The patient was also educated on the progress of her recovery, her current level of irritability, and further future expectations she should have surrounding her recovery  The patient tolerated manual and active treatment well today  The patient would benefit from further skilled PT services in the event she has further pain, but if not this current episode of care will be concluded  Goals  Patient will be independent with home exercise program    Patient will be able to manage symptoms independently       Short Term Goals: to be achieved by 4 weeks  1) Patient to be independent with basic HEP (MET)  2) Decrease pain to 4/10 at its worst (MET)  3) Increase all limited ROM by 5-10 degrees in all affected planes (MET)  4) Increase LE strength by 1/2 MMT grade in all affected planes (MET)    Long Term Goals: to be achieved by discharge  1) FOTO equal to or greater than 66  2) Ambulation to improve to maximal level of function (MET)  3) Stair negotiation will improve to reciprocal (MET)  4) Sit to stand transfers will improve to maximal level of function (MET)    Plan: Continue per plan of care  DC if further progress is achieved       Precautions: Cardiac arrhythmia      Manuals 3/3      12/29 1/5/21 1/12 2/17   Knee med/lat MWM SRB       SRB SRB SRB   Patellar glides all planes (gr II-III) SRB       SRB SRB SRB   Tibiofemoral joints A-P glides all planes (gr II-III)        SRB SRB SRB   Tibiofemoral joint distraction  SRB       SRB SRB                 Kinesiotaping patellar "donut"          SRB                             Neuro Re-Ed             Quad set          Add SLR + heel slide for hip flexor    Standing SLR 3 ways        Steamboats, 10 slow+10 fast GTB, HEP  Add resistance loop   Side stepping        x3 laps GTB, HEP     Single leg deadlift Review 3x10 w/ blue KB        3x10 10# db, HEP Review + coaching                                          Ther Ex             Active warmup        TM x6 min [place] x5 min NuStep x8 min for max knee flex   Supine hamstring stretch             Sidelying hip abduction          SLR boxes   Single leg bridge             Leg press             TKE @ [place]             Retro-walking @ [place]             Calf raises @ Leg press             Lunges w/ slider             Lateral lunges w/ slider             Lateral step-down        2x20 4", HEP     Prone knee ext against band Modified to short-sitting resisted knee ext 3x10        3x10, HEP Black TB today   Lunges         3x10, HEP                              Ther Activity             Step ups             Heel raises             Forward step-down          2x20, HEP Coaching, knee taping                Calf stretching for daily recovery and improved function 10 sec x10                         Gait Training             Stair ascent/descent education             Assistive device training/modification             Modalities                                       Assessment       RE, FOTO      Education       UPOC, Exam findings   HEP review and modification, body mechanics observation and training

## 2021-03-19 DIAGNOSIS — E78.5 DYSLIPIDEMIA: Primary | ICD-10-CM

## 2021-03-24 ENCOUNTER — APPOINTMENT (OUTPATIENT)
Dept: PHYSICAL THERAPY | Facility: CLINIC | Age: 56
End: 2021-03-24
Payer: COMMERCIAL

## 2021-03-25 ENCOUNTER — APPOINTMENT (OUTPATIENT)
Dept: PHYSICAL THERAPY | Facility: CLINIC | Age: 56
End: 2021-03-25
Payer: COMMERCIAL

## 2021-03-25 DIAGNOSIS — Z23 ENCOUNTER FOR IMMUNIZATION: ICD-10-CM

## 2021-03-31 ENCOUNTER — APPOINTMENT (OUTPATIENT)
Dept: PHYSICAL THERAPY | Facility: CLINIC | Age: 56
End: 2021-03-31
Payer: COMMERCIAL

## 2021-04-01 ENCOUNTER — EVALUATION (OUTPATIENT)
Dept: PHYSICAL THERAPY | Facility: CLINIC | Age: 56
End: 2021-04-01
Payer: COMMERCIAL

## 2021-04-01 DIAGNOSIS — G89.29 CHRONIC PAIN OF RIGHT KNEE: Primary | ICD-10-CM

## 2021-04-01 DIAGNOSIS — M23.306 OTHER MENISCUS DERANGEMENTS, UNSPECIFIED MENISCUS, RIGHT KNEE: ICD-10-CM

## 2021-04-01 DIAGNOSIS — M25.561 CHRONIC PAIN OF RIGHT KNEE: Primary | ICD-10-CM

## 2021-04-01 PROCEDURE — 97110 THERAPEUTIC EXERCISES: CPT | Performed by: PHYSICAL THERAPIST

## 2021-04-01 PROCEDURE — 97112 NEUROMUSCULAR REEDUCATION: CPT | Performed by: PHYSICAL THERAPIST

## 2021-04-01 PROCEDURE — 97140 MANUAL THERAPY 1/> REGIONS: CPT | Performed by: PHYSICAL THERAPIST

## 2021-04-01 NOTE — PROGRESS NOTES
PT Re-Evaluation    Today's date: 2021  Patient name: Eugenio Cano  : 1965  MRN: [id number]  Referring provider: Kelly Daily  Dx:   Encounter Diagnosis     ICD-10-CM    1  Chronic pain of right knee  M25 561     G89 29    2  Other meniscus derangements, unspecified meniscus, right knee  M23 306        Start Time: 1645  Stop Time: 1730  Total time in clinic (min): 45 minutes    Subjective: Samantha Patel was a previous patient who attended PT with complaints of acute right knee pain  The patient was treated conservatively with minimal success, and was subsequently referred for further medical workup including imaging and surgical consultation  The patient was confirmed to have a meniscus tear in her right knee along with bone tissue injury, however she does not present with significant enough concerns to warrant surgery at this time, leading the surgeon to refer her back to PT for further management  Since returning to PT, the patient reports improvement in symptoms  The patient reports 2/10 pain at it's worst over the past 24 hours, and reports 75% improvement in overall condition since beginning formal PT care  The patient's chief remaining concern is pursuing any option to return to previous level of activity          Objective: See treatment diary below      Observations     Additional Observation Details  WELLS DVT CRITERIA:  DVT Risk  (+1) Active cancer within last 6 months negative  (+1) Paralysis, paresis or recent plaster immobilization of legs  negative  (+1) Recent Bedridden for >3 days or major surgery within last 12 weeks  negative  (+1) Localized tenderness along deep venous system  negative  (+1) Entire leg swelling  negative  (+1) Calf swelling > 3cm compared with asymptomatic leg (10cm below tibial tuberosity)  negative  (+1) Pitting Edema  negative  (+1) Collateral supervicial veins (non-varicose)  negative  (+1) Previously documented DVT  negative    (-2)  Alternative diagnosis more likely (muscle tear, cellulitis, etc)  negative    Score: 0  DVT considered likely in patients with score of 2 or more, unlikely if less than 2      Palpation     Right   No palpable tenderness to the lateral gastrocnemius and medial gastrocnemius  Slight tenderness of the distal biceps femoris, distal semimembranosus and distal semitendinosus  Tenderness     Right Knee   No tenderness in the lateral joint line and medial joint line  Active Range of Motion   Left Knee   Flexion: 140 degrees   Extension: +2 degrees     Right Knee   Flexion: 125 degrees   Extension: +2 degrees     Mobility   Patellar Mobility:   Left Knee   WFL: medial, lateral, superior and inferior  Right Knee   WFL: medial, lateral, superior and inferior  Tibiofemoral Mobility:   Left knee   Tibiofemoral tendons within functional limits include the anterior and posterior  Right knee Tibiofemoral tendons within functional limits include the anterior and posterior  Strength/Myotome Testing     Left Knee   Flexion: 5  Extension: 5  Quadriceps contraction: good    Right Knee   Flexion: 4+  Extension: 4+  Quadriceps contraction: good    Tests     Right Knee   Positive Thessaly's test at 5 degrees  Negative active quad, anterior drawer, anterior Lachman, Apley's compression, lateral Shanika, medial Shanika, posterior drawer, posterior sag, valgus stress test at 30 degrees and varus stress test at 30 degrees       Swelling     Left Knee Girth Measurement (cm)   10 cm below joint line: 42 cm    Right Knee Girth Measurement (cm)   10 cm below joint line: 42 5 cm    Ambulation     Ambulation: Level Surfaces   Ambulation with assistive device: independent    Ambulation: Stairs   Ascend stairs: independent  Pattern: reciprocal  Railings: none  Descend stairs: independent  Pattern: reciprocal  Railings: none        Assessment: Jaxon Fields is a pleasant 48 y o  female who has been receiving PT intervention for right knee  The patient has demonstrated decreased pain, increased ROM and improved gait mechanics  since beginning treatment  Primary remaining impairments include:    1)  Limited right knee mobility    2)  Limited muscle recruitment throughout the right lower quarter    Goals  Patient will be independent with home exercise program    Patient will be able to manage symptoms independently  Short Term Goals: to be achieved by 4 weeks  1) Patient to be independent with basic HEP (MET)  2) Decrease pain to 4/10 at its worst (MET)  3) Increase all limited ROM by 5-10 degrees in all affected planes (MET)  4) Increase LE strength by 1/2 MMT grade in all affected planes (MET)    Long Term Goals: to be achieved by discharge  1) FOTO equal to or greater than 66  2) Ambulation to improve to maximal level of function (MET)  3) Stair negotiation will improve to reciprocal (PARTIALLY MET)  4) Sit to stand transfers will improve to maximal level of function  (MET)      Plan: Patient to be discharged from formal PT care as she returns to her orthopedic surgeon to discuss further options         Precautions: Cardiac arrhythmia      Manuals 3/3 4/1     12/29 1/5/21 1/12 2/17   Knee med/lat MWM SRB SRB      SRB SRB SRB   Patellar glides all planes (gr II-III) SRB SRB      SRB SRB SRB   Tibiofemoral joints A-P glides all planes (gr II-III)        SRB SRB SRB   Tibiofemoral joint distraction  SRB SRB      SRB SRB                 Kinesiotaping patellar "donut"          SRB                             Neuro Re-Ed             Quad set          Add SLR + heel slide for hip flexor    Standing SLR 3 ways        [place], 10 slow+10 fast GTB, HEP  Add resistance loop   Side stepping        x3 laps GTB, HEP     Single leg deadlift Review 3x10 w/ blue KB        3x10 10# db, HEP Review + coaching                                          Ther Ex             Active warmup  Elliptical x10 min       TM x6 min [place] x5 min NuStep x8 min for max knee flex   Supine hamstring stretch             Sidelying hip abduction          SLR boxes                Leg press  105# 3x10           Knee flexion machine  44# 3x10           Knee extension machine  44# 3x10                                     Lateral lunges w/ slider             Lateral step-down        2x20 4", HEP     Prone knee ext against band Modified to short-sitting resisted knee ext 3x10        3x10, HEP Black TB today   Lunges         3x10, HEP                              Ther Activity             Step ups             Heel raises             Forward step-down          2x20, HEP Coaching, knee taping                Calf stretching for daily recovery and improved function 10 sec x10                         Gait Training             Stair ascent/descent education             Assistive device training/modification             Modalities                                       Assessment  RE     RE, FOTO      Education  DC planning     UPOC, Exam findings   HEP review and modification, body mechanics observation and training

## 2021-04-05 ENCOUNTER — RA CDI HCC (OUTPATIENT)
Dept: OTHER | Facility: HOSPITAL | Age: 56
End: 2021-04-05

## 2021-04-05 NOTE — PROGRESS NOTES
[facility] 76  coding oppertunities          Chart reviewed, no opportunity found: CHART REVIEWED, NO OPPORTUNITY FOUND

## 2021-04-07 ENCOUNTER — OFFICE VISIT (OUTPATIENT)
Dept: OBGYN CLINIC | Facility: CLINIC | Age: 56
End: 2021-04-07
Payer: COMMERCIAL

## 2021-04-07 VITALS
WEIGHT: 211 LBS | HEIGHT: 65 IN | HEART RATE: 60 BPM | SYSTOLIC BLOOD PRESSURE: 121 MMHG | DIASTOLIC BLOOD PRESSURE: 86 MMHG | BODY MASS INDEX: 35.16 KG/M2

## 2021-04-07 DIAGNOSIS — S72.491A CLOSED OSTEOCHONDRAL FRACTURE OF DISTAL END OF RIGHT FEMUR, INITIAL ENCOUNTER (HCC): Primary | ICD-10-CM

## 2021-04-07 PROCEDURE — 99213 OFFICE O/P EST LOW 20 MIN: CPT | Performed by: ORTHOPAEDIC SURGERY

## 2021-04-07 NOTE — PROGRESS NOTES
Patient Name:  Glenny Oliveira  MRN:  [id number]    Assessment & Plan     Right knee medial meniscus posterior root avulsion, severe medial compartment DJD, subchondral insufficiency fracture MFC, and MCL sprain after fall 10/10/20  1  Patient notes overall improvement with regards to her symptoms and would like to continue conservative management at this time  2  Patient inquired about PRP injections  Referral placed for Dr Miah Elkins to discuss this further  3  Follow up as needed with Dr Nafisa Mendoza    History of the Present Illness      59-year-old female returns to the office today for follow-up regarding her right knee  Today she does note overall improvement  She still notes intermittent discomfort which occurs after prolonged sitting and taking her first few steps  She also notes pain with walking down an incline and prolonged walking on uneven surfaces  She has been utilizing Advil with relief  She denies any swelling stiffness weakness or instability  General ROS:  Negative for fever or chills  Neurological ROS:  Negative for numbness or tingling  Physical Exam     /86   Pulse 60   Ht 5' 5" (1 651 m)   Wt 95 7 kg (211 lb)   LMP 02/28/2019 (Exact Date)   BMI 35 11 kg/m²     Right knee: Skin intact  No gross deformity  No effusion  Tenderness to palpation anteromedial joint line  Range of motion 0-120 without pain  Stable to varus and valgus stress  Stable Lachman test   Negative Shanika's test   Sensation intact distally  Skin warm and well perfused  Appropriate mood and affect  Data Review     I have personally reviewed pertinent films in PACS, and my interpretation follows  MRI right knee 11/18/2020: Tear of the medial meniscus at the posterior meniscal root with meniscal extrusion  Severe medial compartment degenerative changes  Small nondisplaced subchondral insufficiency fracture of the medial femoral condyle   Severe soft tissue edema superficial to the medial collateral ligament suggestive of sprain      Social History     Tobacco Use    Smoking status: Former Smoker    Smokeless tobacco: Never Used    Tobacco comment: quit in 20s    Substance Use Topics    Alcohol use: Yes     Frequency: 2-3 times a week    Drug use: No         Procedures    Scribe Attestation    I,:  Blanquita Benoit PA-C am acting as a scribe while in the presence of the attending physician :       I,:  Vonda Cramer MD personally performed the services described in this documentation    as scribed in my presence :

## 2021-04-15 ENCOUNTER — OFFICE VISIT (OUTPATIENT)
Dept: FAMILY MEDICINE CLINIC | Facility: CLINIC | Age: 56
End: 2021-04-15
Payer: COMMERCIAL

## 2021-04-15 VITALS
HEART RATE: 58 BPM | OXYGEN SATURATION: 99 % | DIASTOLIC BLOOD PRESSURE: 78 MMHG | SYSTOLIC BLOOD PRESSURE: 120 MMHG | TEMPERATURE: 98.2 F | BODY MASS INDEX: 35.82 KG/M2 | HEIGHT: 65 IN | WEIGHT: 215 LBS

## 2021-04-15 DIAGNOSIS — H69.83 DYSFUNCTION OF BOTH EUSTACHIAN TUBES: ICD-10-CM

## 2021-04-15 DIAGNOSIS — S72.491A CLOSED OSTEOCHONDRAL FRACTURE OF DISTAL END OF RIGHT FEMUR, INITIAL ENCOUNTER (HCC): ICD-10-CM

## 2021-04-15 DIAGNOSIS — Z78.0 ASYMPTOMATIC POSTMENOPAUSAL ESTROGEN DEFICIENCY: ICD-10-CM

## 2021-04-15 DIAGNOSIS — Z77.018 EXPOSURE TO MERCURY: ICD-10-CM

## 2021-04-15 DIAGNOSIS — Z00.00 ANNUAL PHYSICAL EXAM: Primary | ICD-10-CM

## 2021-04-15 PROCEDURE — 3725F SCREEN DEPRESSION PERFORMED: CPT | Performed by: FAMILY MEDICINE

## 2021-04-15 PROCEDURE — 99396 PREV VISIT EST AGE 40-64: CPT | Performed by: FAMILY MEDICINE

## 2021-04-15 PROCEDURE — 99213 OFFICE O/P EST LOW 20 MIN: CPT | Performed by: FAMILY MEDICINE

## 2021-04-15 RX ORDER — METHYLPREDNISOLONE 4 MG/1
TABLET ORAL
Qty: 21 EACH | Refills: 0 | Status: SHIPPED | OUTPATIENT
Start: 2021-04-15

## 2021-04-15 NOTE — PATIENT INSTRUCTIONS

## 2021-04-15 NOTE — PROGRESS NOTES
[address]    NAME: Claire Smith  AGE: 48 y o  SEX: female  : 1965     DATE: 4/15/2021     Assessment and Plan:     Problem List Items Addressed This Visit     None      Visit Diagnoses     [facility]ochond[facility] of distal end of right femur[facility]    -  Primary          Immunizations and preventive care screenings were discussed with patient today  Appropriate education was printed on patient's after visit summary  Counseling:  · Exercise: the importance of regular exercise/physical activity was discussed  Recommend exercise 3-5 times per week for at least 30 minutes  No follow-ups on file  Chief Complaint:     Chief Complaint   Patient presents with    Annual Exam      History of Present Illness:     Adult Annual Physical   Patient here for a comprehensive physical exam  The patient reports no problems  Diet and Physical Activity  · Diet/Nutrition: well balanced diet  · Exercise: walking  Depression Screening  PHQ-9 Depression Screening    PHQ-9:   Frequency of the following problems over the past two weeks:      Little interest or pleasure in doing things: 0 - not at all  Feeling down, depressed, or hopeless: 0 - not at all  PHQ-2 Score: 0       General Health  · Sleep: sleeps well  · Hearing: normal - bilateral   · Vision: no vision problems  · Dental: regular dental visits  /GYN Health  · Patient is: postmenopausal  · Last menstrual period:   · Contraceptive method:       Review of Systems:     Review of Systems   Constitutional: Negative for appetite change, chills and fever  HENT: Negative for ear pain, facial swelling, rhinorrhea, sinus pain, sore throat and trouble swallowing  Eyes: Negative for discharge and redness  Respiratory: Negative for chest tightness, shortness of breath and wheezing      Cardiovascular: Negative for chest pain and palpitations  Gastrointestinal: Negative for abdominal pain, diarrhea, nausea and vomiting  Endocrine: Negative for polyuria  Genitourinary: Negative for dysuria and urgency  Musculoskeletal: Negative for arthralgias and back pain  Skin: Negative for rash  Neurological: Negative for dizziness, weakness and headaches  Hematological: Negative for adenopathy  Psychiatric/Behavioral: Negative for behavioral problems, confusion and sleep disturbance  All other systems reviewed and are negative       Past Medical History:     Past Medical History:   Diagnosis Date    Hypercholesterolemia     Palpitations     Paroxysmal SVT (supraventricular tachycardia) (HCC)       Past Surgical History:     Past Surgical History:   Procedure Laterality Date    BREAST BIOPSY Right 03/03/2003    stereotactic core bx    DILATION AND CURETTAGE OF UTERUS  2003    ENDOMETRIAL ABLATION  11/2009    OOPHORECTOMY Right 04/2012    Robotic assisted laparoscopic RSO, excision endometriosis, elevated       Social History:     E-Cigarette/Vaping    E-Cigarette Use Never User      E-Cigarette/Vaping Substances    Nicotine No     THC No     CBD No     Flavoring No     Other No     Unknown No      Social History     Socioeconomic History    Marital status: /Civil Union     Spouse name: None    Number of children: None    Years of education: None    Highest education level: None   Occupational History    None   Social Needs    Financial resource strain: None    Food insecurity     Worry: None     Inability: None    Transportation needs     Medical: None     Non-medical: None   Tobacco Use    Smoking status: Former Smoker    Smokeless tobacco: Never Used    Tobacco comment: quit in 20s    Substance and Sexual Activity    Alcohol use: Yes     Frequency: 2-3 times a week    Drug use: No    Sexual activity: None   Lifestyle    Physical activity     Days per week: None     Minutes per session: None    Stress: None   Relationships    Social connections     Talks on phone: None     Gets together: None     Attends Latter day service: None     Active member of club or organization: None     Attends meetings of clubs or organizations: None     Relationship status: None    Intimate partner violence     Fear of current or ex partner: None     Emotionally abused: None     Physically abused: None     Forced sexual activity: None   Other Topics Concern    None   Social History Narrative    None      Family History:     Family History   Problem Relation Age of Onset    Cancer Mother     Stroke Mother     Dementia Mother     Breast cancer Mother 59    Hyperlipidemia Mother     Arrhythmia Father     Hypertension Father     Atrial fibrillation Father     Melanoma Sister 50        with mets    No Known Problems Daughter     No Known Problems Maternal Grandmother     No Known Problems Maternal Grandfather     No Known Problems Paternal Grandmother     No Known Problems Paternal Grandfather     No Known Problems Daughter     No Known Problems Paternal Aunt       Current Medications:     Current Outpatient Medications   Medication Sig Dispense Refill    Calcium Carb-Cholecalciferol (CALCIUM 1000 + D) 1000-800 MG-UNIT TABS Take 1 tablet by mouth daily      diltiazem (Cartia XT) 120 mg 24 hr capsule Take 1 capsule (120 mg total) by mouth daily 90 capsule 3    Magnesium 100 MG CAPS Take by mouth      MULTIPLE VITAMINS PO Take by mouth      Omega-3 Fatty Acids (FISH OIL) 645 MG CAPS Take 1 capsule by mouth 2 (two) times a day       valACYclovir (VALTREX) 1,000 mg tablet Take 1 tablet by mouth 3 (three) times a day as needed       No current facility-administered medications for this visit         Allergies:     No Known Allergies   Physical Exam:     /78   Pulse 58   Temp 98 2 °F (36 8 °C)   Ht 5' 5" (1 651 m)   Wt 97 5 kg (215 lb)   LMP 02/28/2019 (Exact Date)   SpO2 99%   BMI 35 78 kg/m² Physical Exam  Vitals signs and nursing note reviewed  Constitutional:       General: She is not in acute distress  Appearance: Normal appearance  She is well-developed  She is not ill-appearing or diaphoretic  HENT:      Head: Normocephalic and atraumatic  Right Ear: Tympanic membrane, ear canal and external ear normal       Left Ear: Tympanic membrane, ear canal and external ear normal       Nose: Nose normal  No congestion or rhinorrhea  Mouth/Throat:      Mouth: Mucous membranes are moist       Pharynx: Oropharynx is clear  No posterior oropharyngeal erythema  Eyes:      General:         Right eye: No discharge  Left eye: No discharge  Extraocular Movements: Extraocular movements intact  Conjunctiva/sclera: Conjunctivae normal       Pupils: Pupils are equal, round, and reactive to light  Neck:      Musculoskeletal: Normal range of motion and neck supple  No neck rigidity  Vascular: No carotid bruit  Cardiovascular:      Rate and Rhythm: Normal rate and regular rhythm  Pulses: Normal pulses  Heart sounds: Normal heart sounds  No murmur  Pulmonary:      Effort: Pulmonary effort is normal  No respiratory distress  Breath sounds: Normal breath sounds  No wheezing or rhonchi  Abdominal:      General: Abdomen is flat  Bowel sounds are normal  There is no distension  Palpations: Abdomen is soft  There is no mass  Tenderness: There is no abdominal tenderness  Musculoskeletal: Normal range of motion  General: No swelling or deformity  Right lower leg: No edema  Left lower leg: No edema  Lymphadenopathy:      Cervical: No cervical adenopathy  Skin:     General: Skin is warm and dry  Capillary Refill: Capillary refill takes less than 2 seconds  Coloration: Skin is not jaundiced  Findings: No bruising, erythema or rash  Neurological:      General: No focal deficit present        Mental Status: She is alert and oriented to person, place, and time  Cranial Nerves: No cranial nerve deficit  Sensory: No sensory deficit  Gait: Gait normal       Deep Tendon Reflexes: Reflexes normal    Psychiatric:         Mood and Affect: Mood normal          Behavior: Behavior normal          Thought Content: Thought content normal          Judgment: Judgment normal           BMI Counseling: Body mass index is 35 78 kg/m²  The BMI is above normal  Nutrition recommendations include reducing portion sizes, decreasing overall calorie intake and moderation in carbohydrate intake      Sae Rosa, [id number] [address]

## 2021-04-21 NOTE — PROGRESS NOTES
Patient ID: Vincent Donnelly is a 48 y o  female  HPI: 48 y  o female presenting with symptoms of ear pressure, crackling of ears and dizziness associated with positional changes        SUBJECTIVE    Family History   Problem Relation Age of Onset   [de-identified] Cancer Mother    [de-identified] Stroke Mother     Dementia Mother     Breast cancer Mother 59    Hyperlipidemia Mother     Arrhythmia Father     Hypertension Father     Atrial fibrillation Father     Melanoma Sister 50        with mets    No Known Problems Daughter     No Known Problems Maternal Grandmother     No Known Problems Maternal Grandfather     No Known Problems Paternal Grandmother     No Known Problems Paternal Grandfather     No Known Problems Daughter     No Known Problems Paternal Aunt      Social History     Socioeconomic History    Marital status: /Civil Union     Spouse name: Not on file    Number of children: Not on file    Years of education: Not on file    Highest education level: Not on file   Occupational History    Not on file   Social Needs    Financial resource strain: Not on file    Food insecurity     Worry: Not on file     Inability: Not on file    Transportation needs     Medical: Not on file     Non-medical: Not on file   Tobacco Use    Smoking status: Former Smoker    Smokeless tobacco: Never Used    Tobacco comment: quit in 25s    Substance and Sexual Activity    Alcohol use: Yes     Frequency: 2-3 times a week    Drug use: No    Sexual activity: Not on file   Lifestyle    Physical activity     Days per week: Not on file     Minutes per session: Not on file    Stress: Not on file   Relationships    Social connections     Talks on phone: Not on file     Gets together: Not on file     Attends [employer]an service: Not on file     Active member of club or organization: Not on file     Attends meetings of clubs or organizations: Not on file     Relationship status: Not on file    Intimate partner violence     Fear of current or ex partner: Not on file     Emotionally abused: Not on file     Physically abused: Not on file     Forced sexual activity: Not on file   Other Topics Concern    Not on file   Social History Narrative    Not on file     Past Medical History:   Diagnosis Date    Hypercholesterolemia     Palpitations     Paroxysmal SVT [facility]supraventricular tachycardia) ([facility] )      Past Surgical History:   Procedure Laterality Date    BREAST BIOPSY Right 03/03/2003    stereotactic core bx    DILATION AND CURETTAGE OF UTERUS  2003    ENDOMETRIAL ABLATION  11/2009    OOPHORECTOMY Right 04/2012    Robotic assisted laparoscopic RSO, excision endometriosis, elevated      No Known Allergies    Current Outpatient Medications:     Calcium Carb-Cholecalciferol (CALCIUM 1000 + D) 1000-800 MG-UNIT TABS, Take 1 tablet by mouth daily, Disp: , Rfl:     diltiazem (Cartia XT) 120 mg 24 hr capsule, Take 1 capsule (120 mg total) by mouth daily, Disp: 90 capsule, Rfl: 3    Magnesium 100 MG CAPS, Take by mouth, Disp: , Rfl:     MULTIPLE VITAMINS PO, Take by mouth, Disp: , Rfl:     Omega-3 Fatty Acids (FISH OIL) 645 MG CAPS, Take 1 capsule by mouth 2 (two) times a day , Disp: , Rfl:     valACYclovir (VALTREX) 1,000 mg tablet, Take 1 tablet by mouth 3 (three) times a day as needed, Disp: , Rfl:     methylPREDNISolone 4 MG tablet therapy pack, Use as directed on package, Disp: 21 each, Rfl: 0    Review of Systems  Constitutional:     Denies fever, chills, fatigue, weakness ,weight loss, weight gain       ENT: Denies earache, loss of hearing, nosebleed, nasal discharge,nasal congestion, sore throat,hoarseness, but complains of ear pressure,and crackling    Pulmonary: Denies shortness of breath ,cough , dyspnea on exertionon, orthopnea , PND   Cardiovascular:  Denies bradycardia , tachycardia ,palpations, lower extremity, edema leg, claudication  Breast:  Denies new or changing breast lumps,  nipple discharge, nipple changes,  Abdomen:  Denies abdominal pain , anorexia ,indigestion, nausea ,vomiting, constipation , diarrhea  Musculoskeletal: Denies myalgias, arthralgias, joint swelling, joint stiffness ,limb pain, limb swelling  Lymph:denies swollen glands  Gu: no dysuria or urinary frequency  Skin: Denies skin rash, skin lesion, skin wound, itching,dry skin  Neuro: Denies headache, numbness, tingling, confusion, loss of consciousness, but complains of postitional vertigo  Psychiatric: Denies feelings of depression, suicidal ideation, anxiety, sleep disturbances    OBJECTIVE  /78   Pulse 58   Temp 98 2 °F (36 8 °C)   Ht 5' 5" (1 651 m)   Wt 97 5 kg (215 lb)   LMP 02/28/2019 (Exact Date)   SpO2 99%   BMI 35 78 kg/m²   Constitutional:   NAD, well appearing and well nourished      ENT:   Conjunctiva and lids: no injection, edema, or discharge     Pupils and iris: BELEM bilaterally    External inspection of ears and nose: normal without deformities or discharge  Otoscopic exam: Canals patent with tm dull, no erythema,but large effusions noted bilaterally  ENasal mucosa, septum and turbinates: Turbinae injection with discharge   Oropharynx:  Moist mucosa, normal tongue and tonsils without lesions  Erythema and injection  of post pharynx with pnd      Pulmonary:Respiratory effort normal rate and rhythm, no increased work of breathing   Auscultation of lungs:  Clear bilaterally with no adventitious breath sounds       Cardiovascular: regular rate and rhythm, S1 and S2, no murmur, no edema and/or varicosities of LE      Abdomen: Soft and non-distended     Positive bowel sounds      No heptomegaly or splenomegaly      Lymphatic: Anterior and posterior cervical lymphadenopathy         Muscskeletal:  Gait and station: Normal gait      Digits and nails normal without clubbing or cyanosis       Inspection/palpation of joints, bones, and muscles:  No joint tenderness, swelling, full active and passive range of motion       Gu: no suprabubic tenderness, CVA tenderness or urethral discharge  Skin: Normal skin turgor and no rashes      Neuro:    Normal cranial nerves     Normal reflexes   Normal sensation    Psych:   alert and oriented to person, place and time     normal mood and affect       Assessment/Plan:Diagnoses and all orders for this visit:    Annual physical exam    Dysfunction of both eustachian tubes  -     methylPREDNISolone 4 MG tablet therapy pack; Use as directed on package    Closed osteochondral fracture of distal end of right femur, initial encounter ([facility] )  -     DXA bone density spine hip and pelvis; Future    Asymptomatic postmenopausal estrogen deficiency  -     DXA bone density spine hip and pelvis; Future    Exposure to mercury  -     Mercury, blood; Future        Reviewed with patient plan to treat with above plan      Patient instructed to call in 72 hours if not feeling better or if symptoms worsen

## 2021-04-26 ENCOUNTER — CONSULT (OUTPATIENT)
Dept: OBGYN CLINIC | Facility: CLINIC | Age: 56
End: 2021-04-26
Payer: COMMERCIAL

## 2021-04-26 VITALS
HEIGHT: 65 IN | HEART RATE: 60 BPM | DIASTOLIC BLOOD PRESSURE: 82 MMHG | WEIGHT: 215 LBS | BODY MASS INDEX: 35.82 KG/M2 | SYSTOLIC BLOOD PRESSURE: 125 MMHG

## 2021-04-26 DIAGNOSIS — S72.491A CLOSED OSTEOCHONDRAL FRACTURE OF DISTAL END OF RIGHT FEMUR, INITIAL ENCOUNTER (HCC): ICD-10-CM

## 2021-04-26 PROCEDURE — 1036F TOBACCO NON-USER: CPT | Performed by: PHYSICAL MEDICINE & REHABILITATION

## 2021-04-26 PROCEDURE — 99243 OFF/OP CNSLTJ NEW/EST LOW 30: CPT | Performed by: PHYSICAL MEDICINE & REHABILITATION

## 2021-04-26 PROCEDURE — 3008F BODY MASS INDEX DOCD: CPT | Performed by: PHYSICAL MEDICINE & REHABILITATION

## 2021-04-26 NOTE — PROGRESS NOTES
1  Closed osteochondral fracture of distal end of right femur, initial encounter [facility])  Ambulatory referral to Sports Medicine     No orders of the defined types were placed in this encounter  Impression:  Right knee pain likely secondary to medial meniscal derangement, medial compartment OA and history of subchondral insufficiency fracture of medial femoral condyle  Patient presents in consultation for platelet rich plasma injection(s)  Patient recently took a Medrol Dosepak  In light of this, we will have her return in about 3-4 weeks for this planned platelet rich plasma injection into the right knee joint  Imaging Studies (I personally reviewed images in PACS and report):  MRI of the right knee dated 11/18/2020:  "Full-thickness tear through the medial meniscus posterior meniscal root, with meniscal extrusion (series 5 images 9 through 12, and series 7 images 10 through 12 )     Small nondisplaced osteochondral fracture or subchondral insufficiency fracture of the medial femoral condyle (series 7 image 12 and series 5 image 8 )     Severe medial tibiofemoral compartment osteoarthritis "    Return in about 4 weeks (around 5/24/2021)  HPI:  Gonzalo Mtz is a 48 y o  female  who presents for evaluation of   Chief Complaint   Patient presents with    Right Knee - Pain       Onset/Mechanism: Chronic pain that worsened after a fall this past October  Seen by Dr Jose Angel Cohen and Dr Love Hernández  Location: Medial knee  Radiation: Denies  Provocative: Pivoting when she gets out of the car  Severity: 0/10 while sitting here now  When she got out of the car today, she was limping a little  Yesterday she had 10/10 when pivoting  Associated Symptoms: She gets clicking of the knee  Denies any mechanical block of the knee  Treatment so far: She takes a move free supplement  The last time she took an NSAID was weeks ago  Review of Systems   Constitutional: Positive for activity change   Negative for fever    HENT: Negative for sore throat  Eyes: Negative for visual disturbance  Respiratory: Negative for shortness of breath  Cardiovascular: Negative for chest pain  Gastrointestinal: Negative for abdominal pain  Endocrine: Negative for polydipsia  Genitourinary: Negative for difficulty urinating  Musculoskeletal: Positive for arthralgias  Skin: Negative for rash  Allergic/Immunologic: Negative for immunocompromised state  Neurological: Negative for numbness  Hematological: Does not bruise/bleed easily  Psychiatric/Behavioral: Negative for confusion         Following history reviewed and updated:  Past Medical History:   Diagnosis Date    Hypercholesterolemia     Palpitations     Paroxysmal SVT (supraventricular tachycardia) ([facility] )      Past Surgical History:   Procedure Laterality Date    BREAST BIOPSY Right 03/03/2003    stereotactic core bx    DILATION AND CURETTAGE OF UTERUS  2003    ENDOMETRIAL ABLATION  11/2009    OOPHORECTOMY Right 04/2012    Robotic assisted laparoscopic RSO, excision endometriosis, elevated      Social History   Social History     Substance and Sexual Activity   Alcohol Use Yes    Frequency: 2-3 times a week     Social History     Substance and Sexual Activity   Drug Use No     Social History     Tobacco Use   Smoking Status Former Smoker   Smokeless Tobacco Never Used   Tobacco Comment    quit in 25s      Family History   Problem Relation Age of Onset    Cancer Mother     Stroke Mother     Dementia Mother     Breast cancer Mother 59    Hyperlipidemia Mother     Arrhythmia Father     Hypertension Father     Atrial fibrillation Father     Melanoma Sister 50        with mets    No Known Problems Daughter     No Known Problems Maternal Grandmother     No Known Problems Maternal Grandfather     No Known Problems Paternal Grandmother     No Known Problems Paternal Grandfather     No Known Problems Daughter     No Known Problems Paternal Aunt      No Known Allergies     Constitutional:  /82   Pulse 60   Ht 5' 5" (1 651 m)   Wt 97 5 kg (215 lb)   LMP 02/28/2019 (Exact Date)   BMI 35 78 kg/m²    General: NAD  Eyes: Anicteric sclerae  Neck: Supple  Lungs: Unlabored breathing  Cardiovascular: No lower extremity edema  Skin: Intact without erythema  Neurologic: Sensation intact to light touch  Psychiatric: Mood and affect are appropriate  Right Knee Exam     Tenderness   The patient is experiencing tenderness in the medial joint line      Range of Motion   Extension: normal     Tests   Shanika:  Medial - positive Lateral - negative  Varus: negative Valgus: negative    Other   Erythema: absent  Scars: absent  Sensation: normal  Pulse: present  Swelling: none  Effusion: no effusion present             Procedures

## 2021-04-26 NOTE — PATIENT INSTRUCTIONS
You will be receiving a platelet-rich plasma (PRP) injection  In order to maximize the benefit from the growth factors released from the platelets, it is recommended not to use NSAIDs for the next few months  Some common NSAIDs include ibuprofen (Advil, Motrin), naproxen (Aleve), meloxicam (Mobic), diclofenac (Voltaren), and aspirin  You may use acetaminophen (Tylenol), because this is not an NSAID and will not affect the PRP

## 2021-04-27 ENCOUNTER — TELEPHONE (OUTPATIENT)
Dept: OBGYN CLINIC | Facility: HOSPITAL | Age: 56
End: 2021-04-27

## 2021-04-27 NOTE — TELEPHONE ENCOUNTER
Patient states she forgot to mention yesterday that she takes Zyrtec consistently, usually daily  She is wondering if that would be one of the anti inflammatory medications that Dr Kindra Becker wouldn't want patient to continue taking  Please advise      Zofia Cardenasman SQ#[phone]

## 2021-05-18 ENCOUNTER — OFFICE VISIT (OUTPATIENT)
Dept: OBGYN CLINIC | Facility: CLINIC | Age: 56
End: 2021-05-18
Payer: COMMERCIAL

## 2021-05-18 VITALS
SYSTOLIC BLOOD PRESSURE: 118 MMHG | HEART RATE: 59 BPM | DIASTOLIC BLOOD PRESSURE: 77 MMHG | WEIGHT: 210 LBS | BODY MASS INDEX: 34.95 KG/M2

## 2021-05-18 DIAGNOSIS — S83.241D ACUTE MEDIAL MENISCUS TEAR, RIGHT, SUBSEQUENT ENCOUNTER: ICD-10-CM

## 2021-05-18 DIAGNOSIS — M17.11 PRIMARY OSTEOARTHRITIS OF RIGHT KNEE: Primary | ICD-10-CM

## 2021-05-18 PROCEDURE — 20610 DRAIN/INJ JOINT/BURSA W/O US: CPT | Performed by: PHYSICAL MEDICINE & REHABILITATION

## 2021-05-18 RX ORDER — CETIRIZINE HYDROCHLORIDE 10 MG/1
10 TABLET ORAL DAILY
COMMUNITY
Start: 2021-02-19

## 2021-05-18 NOTE — PROGRESS NOTES
1  Primary osteoarthritis of right knee     2  Acute medial meniscus tear, right, subsequent encounter       No orders of the defined types were placed in this encounter  Impression:  Patient is here in follow up of right knee pain likely secondary to medial meniscal derangement, medial compartment OA and history of subchondral insufficiency fracture of medial femoral condyle  Patient presents for planned platelet rich plasma injection  Please see procedure note below  I will see her back in about six weeks to reassess  Can consider repeat injections in the future depending on how patient does  She will continue to avoid all anti-inflammatories  Imaging Studies (I personally reviewed images in PACS and report):  MRI of the right knee dated 11/18/2020:   "Full-thickness tear through the medial meniscus posterior meniscal root, with meniscal extrusion (series 5 images 9 through 12, and series 7 images 10 through 12 )     Small nondisplaced osteochondral fracture or subchondral insufficiency fracture of the medial femoral condyle (series 7 image 12 and series 5 image 8 )     Severe medial tibiofemoral compartment osteoarthritis "    Return in about 6 weeks (around 6/29/2021)  HPI:  Cherelle Albarado is a 48 y o  female  who presents in follow up  Here for   Chief Complaint   Patient presents with    Right Knee - Follow-up       Date of injury: Chronic pain that worsened after a fall this past October  Seen by Dr Guerline Mathew and Dr Collette Quispe  Trajectory of symptoms: No change since last visit  Review of Systems   Constitutional: Positive for activity change  Negative for fever  HENT: Negative for sore throat  Eyes: Negative for visual disturbance  Respiratory: Negative for shortness of breath  Cardiovascular: Negative for chest pain  Gastrointestinal: Negative for abdominal pain  Endocrine: Negative for polydipsia  Genitourinary: Negative for difficulty urinating     Musculoskeletal: Positive for arthralgias  Skin: Negative for rash  Allergic/Immunologic: Negative for immunocompromised state  Neurological: Negative for numbness  Hematological: Does not bruise/bleed easily  Psychiatric/Behavioral: Negative for confusion  Following history reviewed and updated:  Past Medical History:   Diagnosis Date    Hypercholesterolemia     Palpitations     Paroxysmal SVT (supraventricular tachycardia) ([facility] [address] )      Past Surgical History:   Procedure Laterality Date    BREAST BIOPSY Right 03/03/2003    stereotactic core bx    DILATION AND CURETTAGE OF UTERUS  2003    ENDOMETRIAL ABLATION  11/2009    OOPHORECTOMY Right 04/2012    Robotic assisted laparoscopic RSO, excision endometriosis, elevated      Social History   Social History     Substance and Sexual Activity   Alcohol Use Yes    Frequency: 2-3 times a week     Social History     Substance and Sexual Activity   Drug Use No     Social History     Tobacco Use   Smoking Status Former Smoker   Smokeless Tobacco Never Used   Tobacco Comment    quit in 25s      Family History   Problem Relation Age of Onset    Cancer Mother    Jenny Archer Stroke Mother     Dementia Mother     Breast cancer Mother 59    Hyperlipidemia Mother     Arrhythmia Father     Hypertension Father     Atrial fibrillation Father     Melanoma Sister 50        with mets    No Known Problems Daughter     No Known Problems Maternal Grandmother     No Known Problems Maternal Grandfather     No Known Problems Paternal Grandmother     No Known Problems Paternal Grandfather     No Known Problems Daughter     No Known Problems Paternal Aunt      No Known Allergies     Constitutional:  /77   Pulse 59   Wt 95 3 kg (210 lb)   LMP 02/28/2019 (Exact Date)   BMI 34 95 kg/m²    General: NAD  Eyes: Clear sclerae  ENT: No inflammation, lesion, or mass of lips  No tracheal deviation  Musculoskeletal: As mentioned below    Integumentary: No visible rashes or skin lesions  Pulmonary/Chest: Effort normal  No respiratory distress  Neuro: CN's grossly intact, PALOMO  Psych: Normal affect and judgement  Vascular: WWP  Right Knee Exam     Tenderness   The patient is experiencing tenderness in the medial joint line  Range of Motion   Extension: normal   Flexion: abnormal     Other   Erythema: absent  Scars: absent  Sensation: normal  Pulse: present  Effusion: no effusion present    Comments:  Injection site was CDI  Large joint arthrocentesis: R knee  Noble Protocol:  Procedure performed by: (Medical Assistant)  Consent: Verbal consent obtained  Written consent not obtained  Risks and benefits: risks, benefits and alternatives were discussed  Consent given by: patient  Time out: Immediately prior to procedure a "time out" was called to verify the correct patient, procedure, equipment, support staff and site/side marked as required  Timeout called at: 5/18/2021 10:44 AM   Patient understanding: patient states understanding of the procedure being performed  Procedure consent: procedure consent matches procedure scheduled  Site marked: the operative site was marked  Patient identity confirmed: verbally with patient    Supporting Documentation  Indications: pain   Procedure Details  Location: knee - R knee  Ultrasound guidance: no  Approach: anterolateral    Patient tolerance: patient tolerated the procedure well with no immediate complications  Dressing:  Sterile dressing applied    Patient had an injection of platelet-rich-plasma into the knee joint  Prior to the injection, 6 ml's off PRP were obtained from the blood draw  The entirety of this PRP was easily infiltrated into the knee joint

## 2021-05-18 NOTE — PATIENT INSTRUCTIONS
You had a platelet-rich plasma (PRP) injection  In order to maximize the benefit from the growth factors released from the platelets, it is recommended not to use NSAIDs for the next month  Some common NSAIDs include ibuprofen (Advil, Motrin), naproxen (Aleve), meloxicam (Mobic), diclofenac (Voltaren), and aspirin  You may use acetaminophen (Tylenol), because this is not an NSAID and will not affect the PRP

## 2021-06-29 ENCOUNTER — OFFICE VISIT (OUTPATIENT)
Dept: OBGYN CLINIC | Facility: CLINIC | Age: 56
End: 2021-06-29
Payer: COMMERCIAL

## 2021-06-29 VITALS
SYSTOLIC BLOOD PRESSURE: 107 MMHG | DIASTOLIC BLOOD PRESSURE: 74 MMHG | HEIGHT: 65 IN | WEIGHT: 208.8 LBS | BODY MASS INDEX: 34.79 KG/M2 | HEART RATE: 65 BPM

## 2021-06-29 DIAGNOSIS — S83.241D ACUTE MEDIAL MENISCUS TEAR, RIGHT, SUBSEQUENT ENCOUNTER: Primary | ICD-10-CM

## 2021-06-29 PROCEDURE — 1036F TOBACCO NON-USER: CPT | Performed by: PHYSICAL MEDICINE & REHABILITATION

## 2021-06-29 PROCEDURE — 99212 OFFICE O/P EST SF 10 MIN: CPT | Performed by: PHYSICAL MEDICINE & REHABILITATION

## 2021-06-29 NOTE — PROGRESS NOTES
1  Acute medial meniscus tear, right, subsequent encounter       No orders of the defined types were placed in this encounter  Impression:  Patient is here in follow up of right knee pain likely secondary to medial meniscal derangement, medial compartment OA and history of subchondral insufficiency fracture of medial femoral condyle  She had a platelet rich plasma injection this past mid May  Patient reports that she is doing fine and has been walking  She does continue to have twinges of medial pain  This has not limited her  She will continue to be as active as she can be  I will see her back if her pain worsens or if she decides to proceed with another platelet rich plasma injection  Otherwise, return to clinic if needed  Imaging Studies (I personally reviewed images in PACS and report):  MRI of the right knee dated 11/18/2020:   "Full-thickness tear through the medial meniscus posterior meniscal root, with meniscal extrusion (series 5 images 9 through 12, and series 7 images 10 through 12 )     Small nondisplaced osteochondral fracture or subchondral insufficiency fracture of the medial femoral condyle (series 7 image 12 and series 5 image 8 )     Severe medial tibiofemoral compartment osteoarthritis "    No follow-ups on file  HPI:  Johnathan Galarza is a 48 y o  female  who presents in follow up  Here for   Chief Complaint   Patient presents with    Right Knee - Follow-up, Pain       Date of injury: Chronic pain that worsened after a fall this past October   Seen by Dr Taylor Solitario and Dr Matti Adams  Trajectory of symptoms:  See above  Patient is accompanied by nobody  Review of Systems   Constitutional: Positive for activity change  Negative for fever  HENT: Negative for sore throat  Eyes: Negative for visual disturbance  Respiratory: Negative for shortness of breath  Cardiovascular: Negative for chest pain  Gastrointestinal: Negative for abdominal pain     Endocrine: Negative for polydipsia  Genitourinary: Negative for difficulty urinating  Musculoskeletal: Positive for arthralgias  Skin: Negative for rash  Allergic/Immunologic: Negative for immunocompromised state  Neurological: Negative for numbness  Hematological: Does not bruise/bleed easily  Psychiatric/Behavioral: Negative for confusion  Following history reviewed and updated:  Past Medical History:   Diagnosis Date    Hypercholesterolemia     Palpitations     Paroxysmal SVT (supraventricular tachycardia) ([address] )      Past Surgical History:   Procedure Laterality Date    BREAST BIOPSY Right 03/03/2003    stereotactic core bx    DILATION AND CURETTAGE OF UTERUS  2003    ENDOMETRIAL ABLATION  11/2009    OOPHORECTOMY Right 04/2012    Robotic assisted laparoscopic RSO, excision endometriosis, elevated      Social History   Social History     Substance and Sexual Activity   Alcohol Use Yes     Social History     Substance and Sexual Activity   Drug Use No     Social History     Tobacco Use   Smoking Status Former Smoker   Smokeless Tobacco Never Used   Tobacco Comment    quit in 25s      Family History   Problem Relation Age of Onset    Cancer Mother    Alda Pall Stroke Mother     Dementia Mother     Breast cancer Mother 59    Hyperlipidemia Mother     Arrhythmia Father     Hypertension Father     Atrial fibrillation Father     Melanoma Sister 50        with mets    No Known Problems Daughter     No Known Problems Maternal Grandmother     No Known Problems Maternal Grandfather     No Known Problems Paternal Grandmother     No Known Problems Paternal Grandfather     No Known Problems Daughter     No Known Problems Paternal Aunt      No Known Allergies     Constitutional:  /74   Pulse 65   Ht 5' 5" (1 651 m)   Wt 94 7 kg (208 lb 12 8 oz)   LMP 02/28/2019 (Exact Date)   BMI 34 75 kg/m²    General: NAD  Eyes: Clear sclerae  ENT: No inflammation, lesion, or mass of lips    No tracheal deviation  Musculoskeletal: As mentioned below  Integumentary: No visible rashes or skin lesions  Pulmonary/Chest: Effort normal  No respiratory distress  Neuro: CN's grossly intact, PALOMO  Psych: Normal affect and judgement  Vascular: WWP  Left Knee Exam     Tenderness   The patient is experiencing tenderness in the medial joint line      Range of Motion   Extension: normal   Flexion: abnormal     Tests   Shanika:  Medial - positive Lateral - negative  Varus: negative Valgus: negative    Other   Erythema: absent  Scars: absent  Sensation: normal  Pulse: present             Procedures

## 2021-10-06 ENCOUNTER — HOSPITAL ENCOUNTER (OUTPATIENT)
Dept: RADIOLOGY | Age: 56
Discharge: HOME | End: 2021-10-06
Payer: COMMERCIAL

## 2021-10-06 VITALS — BODY MASS INDEX: 34.16 KG/M2 | HEIGHT: 65 IN | WEIGHT: 205 LBS

## 2021-10-06 DIAGNOSIS — Z12.31 ENCOUNTER FOR SCREENING MAMMOGRAM FOR MALIGNANT NEOPLASM OF BREAST: ICD-10-CM

## 2021-10-06 PROCEDURE — 77067 SCR MAMMO BI INCL CAD: CPT

## 2021-10-06 PROCEDURE — 77063 BREAST TOMOSYNTHESIS BI: CPT

## 2021-10-28 ENCOUNTER — VBI (OUTPATIENT)
Dept: ADMINISTRATIVE | Facility: OTHER | Age: 56
End: 2021-10-28

## 2021-11-17 ENCOUNTER — ANNUAL EXAM (OUTPATIENT)
Dept: OBGYN CLINIC | Facility: CLINIC | Age: 56
End: 2021-11-17
Payer: COMMERCIAL

## 2021-11-17 VITALS
BODY MASS INDEX: 35.82 KG/M2 | WEIGHT: 215 LBS | DIASTOLIC BLOOD PRESSURE: 74 MMHG | HEIGHT: 65 IN | SYSTOLIC BLOOD PRESSURE: 112 MMHG

## 2021-11-17 DIAGNOSIS — R32 URINARY INCONTINENCE, UNSPECIFIED TYPE: ICD-10-CM

## 2021-11-17 DIAGNOSIS — Z01.419 ENCOUNTER FOR ANNUAL ROUTINE GYNECOLOGICAL EXAMINATION: Primary | ICD-10-CM

## 2021-11-17 DIAGNOSIS — Z12.31 ENCOUNTER FOR SCREENING MAMMOGRAM FOR BREAST CANCER: ICD-10-CM

## 2021-11-17 PROBLEM — N85.8 CYST OF UTERUS: Status: RESOLVED | Noted: 2017-08-14 | Resolved: 2021-11-17

## 2021-11-17 PROBLEM — N83.209 OVARIAN CYST: Status: RESOLVED | Noted: 2017-08-14 | Resolved: 2021-11-17

## 2021-11-17 PROCEDURE — S0612 ANNUAL GYNECOLOGICAL EXAMINA: HCPCS | Performed by: OBSTETRICS & GYNECOLOGY

## 2021-12-16 ENCOUNTER — OFFICE VISIT (OUTPATIENT)
Dept: URGENT CARE | Age: 56
End: 2021-12-16
Payer: COMMERCIAL

## 2021-12-16 ENCOUNTER — TELEPHONE (OUTPATIENT)
Dept: FAMILY MEDICINE CLINIC | Facility: CLINIC | Age: 56
End: 2021-12-16

## 2021-12-16 VITALS — TEMPERATURE: 97.3 F | HEART RATE: 98 BPM | RESPIRATION RATE: 18 BRPM | OXYGEN SATURATION: 99 %

## 2021-12-16 DIAGNOSIS — R05.8 COUGH WITH EXPOSURE TO COVID-19 VIRUS: Primary | ICD-10-CM

## 2021-12-16 DIAGNOSIS — Z20.822 COUGH WITH EXPOSURE TO COVID-19 VIRUS: Primary | ICD-10-CM

## 2021-12-16 PROCEDURE — G0382 LEV 3 HOSP TYPE B ED VISIT: HCPCS | Performed by: STUDENT IN AN ORGANIZED HEALTH CARE EDUCATION/TRAINING PROGRAM

## 2021-12-16 PROCEDURE — 87636 SARSCOV2 & INF A&B AMP PRB: CPT | Performed by: STUDENT IN AN ORGANIZED HEALTH CARE EDUCATION/TRAINING PROGRAM

## 2021-12-16 PROCEDURE — S9083 URGENT CARE CENTER GLOBAL: HCPCS | Performed by: STUDENT IN AN ORGANIZED HEALTH CARE EDUCATION/TRAINING PROGRAM

## 2021-12-18 LAB
FLUAV RNA RESP QL NAA+PROBE: NEGATIVE
FLUBV RNA RESP QL NAA+PROBE: NEGATIVE
SARS-COV-2 RNA RESP QL NAA+PROBE: NEGATIVE

## 2021-12-29 ENCOUNTER — IMMUNIZATIONS (OUTPATIENT)
Dept: FAMILY MEDICINE CLINIC | Facility: HOSPITAL | Age: 56
End: 2021-12-29

## 2021-12-29 DIAGNOSIS — Z23 ENCOUNTER FOR IMMUNIZATION: Primary | ICD-10-CM

## 2021-12-29 PROCEDURE — 91300 COVID-19 PFIZER VACC 0.3 ML: CPT

## 2021-12-29 PROCEDURE — 0001A COVID-19 PFIZER VACC 0.3 ML: CPT

## 2022-01-26 DIAGNOSIS — I47.1 PSVT (PAROXYSMAL SUPRAVENTRICULAR TACHYCARDIA) (HCC): ICD-10-CM

## 2022-01-26 RX ORDER — DILTIAZEM HYDROCHLORIDE 120 MG/1
CAPSULE, COATED, EXTENDED RELEASE ORAL
Qty: 90 CAPSULE | Refills: 3 | Status: SHIPPED | OUTPATIENT
Start: 2022-01-26

## 2022-05-17 NOTE — PROGRESS NOTES
Cardiology Follow Up    Qing Turcios  1965  [id number]  [facility] [place]  [address]  [address]  [place] [id number] [phone]    1  PSVT (paroxysmal supraventricular tachycardia) ([address] )     2  PAC (premature atrial contraction)     3  Dyslipidemia         Discussion/Summary:  Ms Arash Adamson is a pleasant 69-year-old female who presents to the office today for routine follow-up  Since her last visit she has been feeling relatively well  She has relatively infrequent symptoms of her PSVT and PACs  She is maintained on diltiazem  No changes were made to her medication regimen  She had an echocardiogram a few years ago which was unremarkable  This does not need to be repeated  Her blood pressure is otherwise controlled  Her most lipids were reviewed  Her LDL is suboptimal   Based on current guidelines no therapy is indicated  We discussed other risk stratification methods including a calcium score  I will reassess her lipids and make further recommendations based on those results  No testing is advised besides blood work  I will see her back in the office in one year sooner if deemed necessary  Interval History:  Ms Arash Adamson is a pleasant 69-year-old female who presents to the office today for routine follow-up  Since her last visit she has remained very active  With the activity she performs she denies any cardiopulmonary symptoms of chest pain or shortness of breath  She denies any signs or symptoms of congestive heart failure including increasing lower extremity edema, paroxysmal nocturnal dyspnea, orthopnea, acute weight gain or increasing abdominal girth  She denies lightheadedness, syncope or presyncope  She denies symptoms of claudication  She will get infrequent symptoms of fluttering   The symptoms are transient lasting seconds and resolve spontaneously without any specific intervention  She denies any sustained palpitations  Problem List     Hypercholesteremia    IBS (irritable bowel syndrome)    Menorrhagia with irregular cycle    Diverticulitis    Cyst of uterus    Corneal abrasion    Ovarian cyst    PSVT (paroxysmal supraventricular tachycardia) (HCC)    Seasonal allergies    PAC (premature atrial contraction)    Mixed dyslipidemia    Palpitation    Achilles tendinitis of right lower extremity    Acute medial meniscus tear, right, subsequent encounter    Acute pain of right knee        Past Medical History:   Diagnosis Date    Hypercholesterolemia     Palpitations     Paroxysmal SVT (supraventricular tachycardia) (HCC)      Social History     Socioeconomic History    Marital status: /Civil Union     Spouse name: Not on file    Number of children: Not on file    Years of education: Not on file    Highest education level: Not on file   Occupational History    Not on file   Tobacco Use    Smoking status: Former Smoker    Smokeless tobacco: Never Used    Tobacco comment: quit in 25s    Vaping Use    Vaping Use: Never used   Substance and Sexual Activity    Alcohol use:  Yes    Drug use: No    Sexual activity: Yes     Partners: Male     Birth control/protection: Post-menopausal   Other Topics Concern    Not on file   Social History Narrative    Not on file     Social Determinants of Health     Financial Resource Strain: Not on file   Food Insecurity: Not on file   Transportation Needs: Not on file   Physical Activity: Not on file   Stress: Not on file   Social Connections: Not on file   Intimate Partner Violence: Not on file   Housing Stability: Not on file      Family History   Problem Relation Age of Onset    Cancer Mother     Stroke Mother     Dementia Mother     Breast cancer Mother 59    Hyperlipidemia Mother     Arrhythmia Father     Hypertension Father     Atrial fibrillation Father     Melanoma Sister 50        with mets    No Known Problems Daughter     No Known Problems Maternal Grandmother     No Known Problems Maternal Grandfather     No Known Problems Paternal Grandmother     No Known Problems Paternal Grandfather     No Known Problems Daughter     No Known Problems Paternal Aunt      Past Surgical History:   Procedure Laterality Date    BREAST BIOPSY Right 03/03/2003    stereotactic core bx    DILATION AND CURETTAGE OF UTERUS  2003    ENDOMETRIAL ABLATION  11/2009    OOPHORECTOMY Right 04/2012    Robotic assisted laparoscopic RSO, excision endometriosis, elevated        Current Outpatient Medications:     Calcium Carb-Cholecalciferol (CALCIUM 1000 + D) 1000-800 MG-UNIT TABS, Take 1 tablet by mouth daily, Disp: , Rfl:     cetirizine (ZyrTEC Allergy) 10 mg tablet, Take 10 mg by mouth daily, Disp: , Rfl:     diltiazem (CARDIZEM CD) 120 mg 24 hr capsule, TAKE 1 CAPSULE DAILY, Disp: 90 capsule, Rfl: 3    Magnesium 100 MG CAPS, Take by mouth, Disp: , Rfl:     methylPREDNISolone 4 MG tablet therapy pack, Use as directed on package (Patient not taking: Reported on 11/17/2021 ), Disp: 21 each, Rfl: 0    MULTIPLE VITAMINS PO, Take by mouth, Disp: , Rfl:     Omega-3 Fatty Acids (FISH OIL) 645 MG CAPS, Take 1 capsule by mouth 2 (two) times a day , Disp: , Rfl:     valACYclovir (VALTREX) 1,000 mg tablet, Take 1 tablet by mouth 3 (three) times a day as needed, Disp: , Rfl:   No Known Allergies    Labs:     Chemistry        Component Value Date/Time    K 4 5 02/25/2021 0739     (H) 02/25/2021 0739    CO2 28 02/25/2021 0739    BUN 12 02/25/2021 0739    CREATININE 0 80 02/25/2021 0739        Component Value Date/Time    CALCIUM 9 7 02/25/2021 0739    ALKPHOS 94 02/25/2021 0739    AST 14 02/25/2021 0739    ALT 22 02/25/2021 0739            No results found for: CHOL  Lab Results   Component Value Date    HDL 66 02/25/2021    HDL 82 (H) 04/14/2017     Lab Results   Component Value Date    LDLCALC 168 (H) 02/25/2021    LDLCALC 138 (H) 04/14/2017     Lab Results   Component Value Date    TRIG 75 02/25/2021    TRIG 45 04/14/2017     No results found for: CHOLHDL    Imaging: No results found  ECG:  Sinus bradycardia, otherwise normal ECG      Review of Systems   Cardiovascular: Positive for irregular heartbeat and palpitations  Negative for chest pain, claudication and dyspnea on exertion  Musculoskeletal: Positive for arthritis and joint pain  All other systems reviewed and are negative  There were no vitals filed for this visit  There were no vitals filed for this visit  There is no height or weight on file to calculate BMI      Physical Exam:  General:  Alert and cooperative, appears stated age  HEENT:  PERRLA, EOMI, no scleral icterus, no conjunctival pallor  Neck:  No lymphadenopathy, no thyromegaly, no carotid bruits, no elevated JVP  Heart:  Regular rate and rhythm, normal S1/S2, no S3/S4, no murmur  Lungs:  Clear to auscultation bilaterally   Abdomen:  Soft, non-tender, positive bowel sounds, no rebound or guarding,   no organomegaly   Extremities:  No clubbing, cyanosis or edema   Vascular:  2+ pedal pulses  Skin:  No rashes or lesions on exposed skin  Neurologic:  Cranial nerves II-XII grossly intact without focal deficits

## 2022-05-18 ENCOUNTER — OFFICE VISIT (OUTPATIENT)
Dept: CARDIOLOGY CLINIC | Facility: CLINIC | Age: 57
End: 2022-05-18
Payer: COMMERCIAL

## 2022-05-18 VITALS
BODY MASS INDEX: 36.02 KG/M2 | DIASTOLIC BLOOD PRESSURE: 60 MMHG | HEIGHT: 65 IN | SYSTOLIC BLOOD PRESSURE: 110 MMHG | WEIGHT: 216.2 LBS

## 2022-05-18 DIAGNOSIS — I47.1 PSVT (PAROXYSMAL SUPRAVENTRICULAR TACHYCARDIA) (HCC): Primary | ICD-10-CM

## 2022-05-18 DIAGNOSIS — E78.5 DYSLIPIDEMIA: ICD-10-CM

## 2022-05-18 DIAGNOSIS — I49.1 PAC (PREMATURE ATRIAL CONTRACTION): ICD-10-CM

## 2022-05-18 PROCEDURE — 93000 ELECTROCARDIOGRAM COMPLETE: CPT | Performed by: INTERNAL MEDICINE

## 2022-05-18 PROCEDURE — 99214 OFFICE O/P EST MOD 30 MIN: CPT | Performed by: INTERNAL MEDICINE

## 2022-05-18 PROCEDURE — 3008F BODY MASS INDEX DOCD: CPT | Performed by: INTERNAL MEDICINE

## 2022-11-09 ENCOUNTER — ANNUAL EXAM (OUTPATIENT)
Dept: OBGYN CLINIC | Facility: CLINIC | Age: 57
End: 2022-11-09

## 2022-11-09 VITALS
HEIGHT: 65 IN | DIASTOLIC BLOOD PRESSURE: 74 MMHG | BODY MASS INDEX: 35.16 KG/M2 | WEIGHT: 211 LBS | SYSTOLIC BLOOD PRESSURE: 112 MMHG

## 2022-11-09 DIAGNOSIS — Z12.11 ENCOUNTER FOR SCREENING COLONOSCOPY: ICD-10-CM

## 2022-11-09 DIAGNOSIS — N95.0 POSTMENOPAUSAL BLEEDING: ICD-10-CM

## 2022-11-09 DIAGNOSIS — Z12.31 ENCOUNTER FOR SCREENING MAMMOGRAM FOR MALIGNANT NEOPLASM OF BREAST: ICD-10-CM

## 2022-11-09 DIAGNOSIS — Z01.419 ENCOUNTER FOR ANNUAL ROUTINE GYNECOLOGICAL EXAMINATION: Primary | ICD-10-CM

## 2022-11-09 DIAGNOSIS — N39.41 URGE INCONTINENCE: ICD-10-CM

## 2022-11-09 NOTE — PROGRESS NOTES
Assessment/Plan:  Pap smear done as well as annual   Encouraged self-breast examination as well as calcium supplementation  Continue annual mammogram   Reviewed colon cancer screening, due for colonoscopy  Will obtain pelvic ultrasound to assess endometrial stripe  She is aware if this is abnormal I would then recommend endometrial biopsy  Will also obtain [address] level  Reviewed menopausal symptoms  All questions answered  Reviewed bladder symptoms  She would like to proceed with pelvic floor physical therapy  Provided above normal, return to office in 1 year or p r n  No problem-specific Assessment & Plan notes found for this encounter  Diagnoses and all orders for this visit:    Encounter for annual routine gynecological examination  -     Liquid-based pap, screening    Encounter for screening mammogram for malignant neoplasm of breast  -     Mammo screening bilateral w 3d & cad; Future    Encounter for screening colonoscopy  -     Ambulatory referral for colon cancer education; Future    Postmenopausal bleeding  -     US pelvis complete w transvaginal; Future  -     Follicle stimulating hormone    Urge incontinence  -     Ambulatory Referral to Physical Therapy; Future          Subjective:      Patient ID: Myesha Landrum is a 65 y o  female  HPI     This is a pleasant 21-year-old female P3 ( x3, age 35, 33, 33) presents for her gyn exam   She went through menopause at age 49  She has never been on hormone replacement therapy  She states that she has had intermittent vaginal pink spotting on 5 separate occasions over the last year  Last episode of spotting   Thereafter she had significant hot flashes  Gyn history significant for Robotic assisted right oophorectomy, excision of endometriosis-, endometrial ablation  which controlled her menstrual cycles, still cycling regularly up until          The following portions of the patient's history were reviewed and updated as appropriate: allergies, current medications, past family history, past medical history, past social history, past surgical history and problem list     Review of Systems   Constitutional: Negative for fatigue, fever and unexpected weight change  Respiratory: Negative for cough, chest tightness, shortness of breath and wheezing  Cardiovascular: Negative  Negative for chest pain and palpitations  Gastrointestinal: Negative  Negative for abdominal distention, abdominal pain, blood in stool, constipation, diarrhea, nausea and vomiting  Genitourinary: Negative  Negative for difficulty urinating, dyspareunia, dysuria, flank pain, frequency, genital sores, hematuria, pelvic pain, urgency, vaginal bleeding, vaginal discharge and vaginal pain  Skin: Negative for rash  Objective:      /74   Ht 5' 5" (1 651 m)   Wt 95 7 kg (211 lb)   LMP 02/28/2019 (Exact Date)   BMI 35 11 kg/m²          Physical Exam  Constitutional:       Appearance: Normal appearance  She is well-developed  Cardiovascular:      Rate and Rhythm: Normal rate and regular rhythm  Pulmonary:      Effort: Pulmonary effort is normal       Breath sounds: Normal breath sounds  Chest:   Breasts:      Right: No inverted nipple, mass, nipple discharge, skin change or tenderness  Left: No inverted nipple, mass, nipple discharge, skin change or tenderness  Abdominal:      General: Bowel sounds are normal  There is no distension  Palpations: Abdomen is soft  Tenderness: There is no abdominal tenderness  There is no guarding or rebound  Genitourinary:     Labia:         Right: No rash, tenderness or lesion  Left: No rash, tenderness or lesion  Vagina: Normal  No signs of injury  No vaginal discharge or tenderness  Cervix: No cervical motion tenderness, discharge, friability, lesion, erythema or cervical bleeding  Uterus: Not enlarged and not tender         Adnexa:         Right: No mass, tenderness or fullness  Left: No mass, tenderness or fullness  Neurological:      Mental Status: She is alert and oriented to person, place, and time     Psychiatric:         Behavior: Behavior normal

## 2022-11-12 LAB
HPV HR 12 DNA CVX QL NAA+PROBE: NEGATIVE
HPV16 DNA CVX QL NAA+PROBE: NEGATIVE
HPV18 DNA CVX QL NAA+PROBE: NEGATIVE

## 2022-11-15 LAB
LAB AP GYN PRIMARY INTERPRETATION: NORMAL
Lab: NORMAL

## 2022-12-14 ENCOUNTER — OFFICE VISIT (OUTPATIENT)
Dept: FAMILY MEDICINE CLINIC | Facility: CLINIC | Age: 57
End: 2022-12-14

## 2022-12-14 VITALS
BODY MASS INDEX: 36.22 KG/M2 | OXYGEN SATURATION: 95 % | DIASTOLIC BLOOD PRESSURE: 78 MMHG | HEIGHT: 65 IN | TEMPERATURE: 97.6 F | HEART RATE: 64 BPM | WEIGHT: 217.4 LBS | SYSTOLIC BLOOD PRESSURE: 110 MMHG

## 2022-12-14 DIAGNOSIS — J01.90 ACUTE SINUSITIS, RECURRENCE NOT SPECIFIED, UNSPECIFIED LOCATION: Primary | ICD-10-CM

## 2022-12-14 DIAGNOSIS — H10.9 BACTERIAL CONJUNCTIVITIS OF RIGHT EYE: ICD-10-CM

## 2022-12-14 RX ORDER — TOBRAMYCIN 3 MG/ML
1 SOLUTION/ DROPS OPHTHALMIC
Qty: 5 ML | Refills: 0 | Status: SHIPPED | OUTPATIENT
Start: 2022-12-14

## 2022-12-14 RX ORDER — CEFUROXIME AXETIL 500 MG/1
500 TABLET ORAL EVERY 12 HOURS SCHEDULED
Qty: 20 TABLET | Refills: 0 | Status: SHIPPED | OUTPATIENT
Start: 2022-12-14 | End: 2022-12-24

## 2022-12-14 RX ORDER — NIACIN 500 MG
500 TABLET ORAL
COMMUNITY

## 2022-12-14 NOTE — PROGRESS NOTES
Patient ID: Curry Velázquez is a 65 y o  female  HPI: 65 y  o female presenting with symptoms of sinus pain,pressure, nasal congestion, pnd dry cough , ear and throat pain  She tested negative for covid; symptoms started approx 3 days ago  She has a red eye with purulent drainage; no itching on the right   SUBJECTIVE    Family History   Problem Relation Age of Onset   • Cancer Mother    • Stroke Mother    • Dementia Mother    • Breast cancer Mother 59   • Hyperlipidemia Mother    • Arrhythmia Father    • Hypertension Father    • Atrial fibrillation Father    • Melanoma Sister 50        with mets   • No Known Problems Daughter    • No Known Problems Maternal Grandmother    • No Known Problems Maternal Grandfather    • No Known Problems Paternal Grandmother    • No Known Problems Paternal Grandfather    • No Known Problems Daughter    • No Known Problems Paternal Aunt      Social History     Socioeconomic History   • Marital status: /Civil Union     Spouse name: Not on file   • Number of children: Not on file   • Years of education: Not on file   • Highest education level: Not on file   Occupational History   • Not on file   Tobacco Use   • Smoking status: Former   • Smokeless tobacco: Never   • Tobacco comments:     quit in 25s    Vaping Use   • Vaping Use: Never used   Substance and Sexual Activity   • Alcohol use:  Yes   • Drug use: No   • Sexual activity: Yes     Partners: Male     Birth control/protection: Post-menopausal   Other Topics Concern   • Not on file   Social History Narrative   • Not on file     Social Determinants of Health     Financial Resource Strain: Not on file   Food Insecurity: Not on file   Transportation Needs: Not on file   Physical Activity: Not on file   Stress: Not on file   Social Connections: Not on file   Intimate Partner Violence: Not on file   Housing Stability: Not on file     Past Medical History:   Diagnosis Date   • Hypercholesterolemia    • Palpitations    • Paroxysmal SVT (supraventricular tachycardia) (HCC)      Past Surgical History:   Procedure Laterality Date   • BREAST BIOPSY Right 03/03/2003    stereotactic core bx   • DILATION AND CURETTAGE OF UTERUS  2003   • ENDOMETRIAL ABLATION  11/2009   • OOPHORECTOMY Right 04/2012    Robotic assisted laparoscopic RSO, excision endometriosis, elevated      No Known Allergies    Current Outpatient Medications:   •  Calcium Carb-Cholecalciferol 1000-800 MG-UNIT TABS, Take 1 tablet by mouth daily, Disp: , Rfl:   •  cefuroxime (CEFTIN) 500 mg tablet, Take 1 tablet (500 mg total) by mouth every 12 (twelve) hours for 10 days, Disp: 20 tablet, Rfl: 0  •  cetirizine (ZyrTEC) 10 mg tablet, Take 10 mg by mouth daily, Disp: , Rfl:   •  Cyanocobalamin (VITAMIN B 12 PO), Take by mouth, Disp: , Rfl:   •  diltiazem (CARDIZEM CD) 120 mg 24 hr capsule, TAKE 1 CAPSULE DAILY, Disp: 90 capsule, Rfl: 3  •  Magnesium 100 MG CAPS, Take by mouth, Disp: , Rfl:   •  niacin 500 mg tablet, Take 500 mg by mouth daily with breakfast, Disp: , Rfl:   •  Omega-3 Fatty Acids (FISH OIL) 645 MG CAPS, Take 1 capsule by mouth 2 (two) times a day , Disp: , Rfl:   •  tobramycin (TOBREX) 0 3 % SOLN, Administer 1 drop to the right eye every 4 (four) hours while awake, Disp: 5 mL, Rfl: 0  •  valACYclovir (VALTREX) 1,000 mg tablet, Take 1 tablet by mouth 3 (three) times a day as needed, Disp: , Rfl:   •  MULTIPLE VITAMINS PO, Take by mouth (Patient not taking: Reported on 12/14/2022), Disp: , Rfl:     Review of Systems  Constitutional:     Denies fever, chills, fatigue, weakness ,weight loss, weight gain      ENT: Denies earache, loss of hearing, nosebleed, nasal discharge,but complains of nasal congestion, sore throat,hoarseness and sinus pain and pressure + red eye with purulent drainage  Pulmonary: Denies shortness of breath ,cough , dyspnea on exertionon, orthopnea ,+ PND   Cardiovascular:  Denies bradycardia , tachycardia ,palpations, lower extremity, edema leg, claudication  Breast:  Denies new or changing breast lumps,  nipple discharge, nipple changes,  Abdomen:  Denies abdominal pain , anorexia ,indigestion, nausea ,vomiting, constipation , diarrhea  Musculoskeletal: Denies myalgias, arthralgias, joint swelling, joint stiffness ,limb pain, limb swelling  Lymph:+ swollen glands  Gu: no dysuria or urinary frequency  Skin: Denies skin rash, skin lesion, skin wound, itching,dry skin  Neuro: Denies headache, numbness, tingling, confusion, loss of consciousness, dizziness ,vertigo  Psychiatric: Denies feelings of depression, suicidal ideation, anxiety, sleep disturbances    OBJECTIVE  /78   Pulse 64   Temp 97 6 °F (36 4 °C)   Ht 5' 5" (1 651 m)   Wt 98 6 kg (217 lb 6 4 oz)   LMP 02/28/2019 (Exact Date)   SpO2 95%   BMI 36 18 kg/m²   Constitutional:   NAD, well appearing and well nourished      ENT:   Conjunctiva and lids:+ conjunctival  Injection,on right  edema, + purulent  discharge  On right   Pupils and iris: BELEM bilaterally   External inspection of ears and nose: normal without deformities or discharge  Otoscopic exam: Canals patent ; tm are dull, with with erythem and effusions  ENasal mucosa, septum and turbinates: Turbinae injection with discharge   Oropharynx:  Moist mucosa, normal tongue and tonsils without lesions  Erythema and injection  of post pharynx with pnd      Pulmonary:Respiratory effort normal rate and rhythm, no increased work of breathing   Auscultation of lungs:  Clear bilaterally with no adventitious breath sounds       Cardiovascular: regular rate and rhythm, S1 and S2, no murmur, no edema and/or varicosities of LE      Abdomen: Soft and non-distended    Positive bowel sounds    No heptomegaly or splenomegaly    Lymphatic: Anterior  cervical lymphadenopathy         Muscskeletal:  Gait and station: Normal gait     Digits and nails normal without clubbing or cyanosis     Inspection/palpation of joints, bones, and muscles: No joint tenderness, swelling, full active and passive range of motion      Gu: no suprabubic tenderness, CVA tenderness or urethral discharge  Skin: Normal skin turgor and no rashes    Neuro:    Normal reflexes   Psych:   alert and oriented to person, place and time  normal mood and affec      Assessment/Plan:  1- acute Sinusitis- rx ceftin 500 bid #20  As well as otc mucinex 12 hr bid      2- acute bacterial conjunctivitis of right eye-tobex ophthalmic solution 1 gtt qid [id number]    Reviewed with patient plan to treat with   Patient instructed to call in 72 hours if not feeling better or if symptoms worsen

## 2022-12-28 ENCOUNTER — TELEPHONE (OUTPATIENT)
Dept: GASTROENTEROLOGY | Facility: CLINIC | Age: 57
End: 2022-12-28

## 2022-12-28 ENCOUNTER — PREP FOR PROCEDURE (OUTPATIENT)
Dept: GASTROENTEROLOGY | Facility: CLINIC | Age: 57
End: 2022-12-28

## 2022-12-28 DIAGNOSIS — Z12.11 SCREENING FOR COLON CANCER: Primary | ICD-10-CM

## 2022-12-28 NOTE — TELEPHONE ENCOUNTER
Scheduled date of colonoscopy (as of today): 3/14/2023    Physician performing colonoscopy: Dr Mara Mcneil    Location of colonoscopy:  [facility]      Clearances: N/A

## 2023-01-05 ENCOUNTER — HOSPITAL ENCOUNTER (OUTPATIENT)
Dept: MAMMOGRAPHY | Facility: HOSPITAL | Age: 58
Discharge: HOME | End: 2023-01-05

## 2023-01-05 VITALS — WEIGHT: 217.37 LBS | HEIGHT: 65 IN | BODY MASS INDEX: 36.22 KG/M2

## 2023-01-05 DIAGNOSIS — Z12.31 ENCOUNTER FOR SCREENING MAMMOGRAM FOR MALIGNANT NEOPLASM OF BREAST: ICD-10-CM

## 2023-01-17 ENCOUNTER — OFFICE VISIT (OUTPATIENT)
Dept: FAMILY MEDICINE CLINIC | Facility: CLINIC | Age: 58
End: 2023-01-17

## 2023-01-17 VITALS
OXYGEN SATURATION: 97 % | HEIGHT: 65 IN | HEART RATE: 70 BPM | DIASTOLIC BLOOD PRESSURE: 72 MMHG | BODY MASS INDEX: 37.22 KG/M2 | WEIGHT: 223.4 LBS | TEMPERATURE: 98.3 F | SYSTOLIC BLOOD PRESSURE: 112 MMHG

## 2023-01-17 DIAGNOSIS — Z23 NEED FOR VACCINATION: ICD-10-CM

## 2023-01-17 DIAGNOSIS — R63.5 WEIGHT GAIN: ICD-10-CM

## 2023-01-17 DIAGNOSIS — Z00.00 ANNUAL PHYSICAL EXAM: Primary | ICD-10-CM

## 2023-01-17 NOTE — PROGRESS NOTES
[address]    NAME: Danitza Ventura  AGE: 65 y o  SEX: female  : 1965     DATE: 2023     Assessment and Plan:     Problem List Items Addressed This Visit    None      Immunizations and preventive care screenings were discussed with patient today  Appropriate education was printed on patient's after visit summary  Counseling:  · Exercise: the importance of regular exercise/physical activity was discussed  Recommend exercise 3-5 times per week for at least 30 minutes  No follow-ups on file  Chief Complaint:     Chief Complaint   Patient presents with   • Physical Exam      History of Present Illness:     Adult Annual Physical   Patient here for a comprehensive physical exam  The patient reports no problems  Diet and Physical Activity  · Diet/Nutrition: well balanced diet  · Exercise: walking  Depression Screening  PHQ-2/9 Depression Screening    Little interest or pleasure in doing things: 1 - several days  Feeling down, depressed, or hopeless: 0 - not at all  PHQ-2 Score: 1  PHQ-2 Interpretation: Negative depression screen       General Health  · Sleep: sleeps well and gets more than 8 hours of sleep on average  · Hearing: normal - bilateral   · Vision: no vision problems  · Dental: regular dental visits  /GYN Health  · Patient is: postmenopausal  · Last menstrual period:   · Contraceptive method:       Review of Systems:     Review of Systems   Constitutional: Positive for unexpected weight change  Negative for appetite change, chills and fever  + weight gain   HENT: Negative for ear pain, facial swelling, rhinorrhea, sinus pain, sore throat and trouble swallowing  Eyes: Negative for discharge, redness and itching  Respiratory: Negative for chest tightness, shortness of breath and wheezing  Cardiovascular: Negative for chest pain and palpitations  Gastrointestinal: Negative for abdominal pain, diarrhea, nausea and vomiting  Endocrine: Negative for polyuria  Genitourinary: Negative for dysuria and urgency  Musculoskeletal: Negative for arthralgias and back pain  Skin: Negative for rash  Neurological: Negative for dizziness, weakness and headaches  Hematological: Negative for adenopathy  Psychiatric/Behavioral: Negative for behavioral problems, confusion and sleep disturbance  All other systems reviewed and are negative  Past Medical History:     Past Medical History:   Diagnosis Date   • Hypercholesterolemia    • Palpitations    • Paroxysmal SVT (supraventricular tachycardia) (HCC)       Past Surgical History:     Past Surgical History:   Procedure Laterality Date   • BREAST BIOPSY Right 03/03/2003    stereotactic core bx   • DILATION AND CURETTAGE OF UTERUS  2003   • ENDOMETRIAL ABLATION  11/2009   • OOPHORECTOMY Right 04/2012    Robotic assisted laparoscopic RSO, excision endometriosis, elevated       Social History:     Social History     Socioeconomic History   • Marital status: /Civil Union     Spouse name: None   • Number of children: None   • Years of education: None   • Highest education level: None   Occupational History   • None   Tobacco Use   • Smoking status: Former   • Smokeless tobacco: Never   • Tobacco comments:     quit in 25s    Vaping Use   • Vaping Use: Never used   Substance and Sexual Activity   • Alcohol use:  Yes   • Drug use: No   • Sexual activity: Yes     Partners: Male     Birth control/protection: Post-menopausal   Other Topics Concern   • None   Social History Narrative   • None     Social Determinants of Health     Financial Resource Strain: Not on file   Food Insecurity: Not on file   Transportation Needs: Not on file   Physical Activity: Not on file   Stress: Not on file   Social Connections: Not on file   Intimate Partner Violence: Not on file   Housing Stability: Not on file      Family History:     Family History   Problem Relation Age of Onset   • Cancer Mother    • Stroke Mother    • Dementia Mother    • Breast cancer Mother 59   • Hyperlipidemia Mother    • Arrhythmia Father    • Hypertension Father    • Atrial fibrillation Father    • Melanoma Sister 50        with mets   • No Known Problems Daughter    • No Known Problems Daughter    • No Known Problems Maternal Grandmother    • No Known Problems Maternal Grandfather    • No Known Problems Paternal Grandmother    • No Known Problems Paternal Grandfather    • No Known Problems Paternal Aunt    • No Known Problems Brother       Current Medications:     Current Outpatient Medications   Medication Sig Dispense Refill   • Calcium Carb-Cholecalciferol 1000-800 MG-UNIT TABS Take 1 tablet by mouth daily     • cetirizine (ZyrTEC) 10 mg tablet Take 10 mg by mouth daily     • Cyanocobalamin (VITAMIN B 12 PO) Take by mouth     • diltiazem (CARDIZEM CD) 120 mg 24 hr capsule TAKE 1 CAPSULE DAILY 90 capsule 3   • Magnesium 100 MG CAPS Take by mouth     • niacin 500 mg tablet Take 500 mg by mouth daily with breakfast     • Omega-3 Fatty Acids (FISH OIL) 645 MG CAPS Take 1 capsule by mouth 2 (two) times a day      • valACYclovir (VALTREX) 1,000 mg tablet Take 1 tablet by mouth 3 (three) times a day as needed     • MULTIPLE VITAMINS PO Take by mouth (Patient not taking: Reported on 12/14/2022)       No current facility-administered medications for this visit  Allergies:     No Known Allergies   Physical Exam:     /72   Pulse 70   Temp 98 3 °F (36 8 °C)   Ht 5' 5" (1 651 m)   Wt 101 kg (223 lb 6 4 oz)   LMP 02/28/2019 (Exact Date)   SpO2 97%   BMI 37 18 kg/m²     Physical Exam  Vitals and nursing note reviewed  Constitutional:       General: She is not in acute distress  Appearance: Normal appearance  She is not ill-appearing or diaphoretic  HENT:      Head: Normocephalic and atraumatic        Right Ear: Tympanic membrane, ear canal and external ear normal       Left Ear: Tympanic membrane, ear canal and external ear normal       Nose: Nose normal  No congestion or rhinorrhea  Mouth/Throat:      Mouth: Mucous membranes are moist       Pharynx: Oropharynx is clear  No posterior oropharyngeal erythema  Eyes:      General:         Right eye: No discharge  Left eye: No discharge  Extraocular Movements: Extraocular movements intact  Conjunctiva/sclera: Conjunctivae normal       Pupils: Pupils are equal, round, and reactive to light  Neck:      Vascular: No carotid bruit  Cardiovascular:      Rate and Rhythm: Normal rate and regular rhythm  Pulses: Normal pulses  Heart sounds: Normal heart sounds  No murmur heard  Pulmonary:      Effort: Pulmonary effort is normal  No respiratory distress  Breath sounds: Normal breath sounds  No wheezing or rhonchi  Abdominal:      General: Abdomen is flat  Bowel sounds are normal  There is no distension  Palpations: There is no mass  Tenderness: There is no abdominal tenderness  Musculoskeletal:         General: No swelling or deformity  Normal range of motion  Cervical back: Normal range of motion and neck supple  No rigidity  Right lower leg: No edema  Left lower leg: No edema  Lymphadenopathy:      Cervical: No cervical adenopathy  Skin:     General: Skin is warm and dry  Capillary Refill: Capillary refill takes less than 2 seconds  Coloration: Skin is not jaundiced  Findings: No bruising, erythema or rash  Neurological:      General: No focal deficit present  Mental Status: She is alert and oriented to person, place, and time  Cranial Nerves: No cranial nerve deficit  Sensory: No sensory deficit  Gait: Gait normal       Deep Tendon Reflexes: Reflexes normal    Psychiatric:         Mood and Affect: Mood normal          Behavior: Behavior normal          Thought Content:  Thought content normal  Judgment: Judgment normal           [address]

## 2023-01-17 NOTE — PATIENT INSTRUCTIONS

## 2023-01-23 DIAGNOSIS — I47.1 PSVT (PAROXYSMAL SUPRAVENTRICULAR TACHYCARDIA) (HCC): ICD-10-CM

## 2023-01-23 RX ORDER — DILTIAZEM HYDROCHLORIDE 120 MG/1
CAPSULE, COATED, EXTENDED RELEASE ORAL
Qty: 90 CAPSULE | Refills: 3 | Status: SHIPPED | OUTPATIENT
Start: 2023-01-23

## 2023-02-01 ENCOUNTER — OFFICE VISIT (OUTPATIENT)
Dept: FAMILY MEDICINE CLINIC | Facility: CLINIC | Age: 58
End: 2023-02-01

## 2023-02-01 VITALS
HEART RATE: 60 BPM | HEIGHT: 65 IN | DIASTOLIC BLOOD PRESSURE: 72 MMHG | WEIGHT: 222 LBS | OXYGEN SATURATION: 98 % | TEMPERATURE: 97.4 F | RESPIRATION RATE: 16 BRPM | SYSTOLIC BLOOD PRESSURE: 126 MMHG | BODY MASS INDEX: 36.99 KG/M2

## 2023-02-01 DIAGNOSIS — H00.019 HORDEOLUM EXTERNUM, UNSPECIFIED LATERALITY: Primary | ICD-10-CM

## 2023-02-06 NOTE — PROGRESS NOTES
Patient ID: Trenton Baez is a 65 y o  female  HPI: 65 y  o female presents for right eyelid chalazion with swelling of upper eyelid, no discharge from eye, redness, or blurred vision  SUBJECTIVE    Family History   Problem Relation Age of Onset   • Cancer Mother    • Stroke Mother    • Dementia Mother    • Breast cancer Mother 59   • Hyperlipidemia Mother    • Arrhythmia Father    • Hypertension Father    • Atrial fibrillation Father    • Melanoma Sister 50        with mets   • No Known Problems Daughter    • No Known Problems Daughter    • No Known Problems Maternal Grandmother    • No Known Problems Maternal Grandfather    • No Known Problems Paternal Grandmother    • No Known Problems Paternal Grandfather    • No Known Problems Paternal Aunt    • No Known Problems Brother      Social History     Socioeconomic History   • Marital status: /Civil Union     Spouse name: Not on file   • Number of children: Not on file   • Years of education: Not on file   • Highest education level: Not on file   Occupational History   • Not on file   Tobacco Use   • Smoking status: Former   • Smokeless tobacco: Never   • Tobacco comments:     quit in 25s    Vaping Use   • Vaping Use: Never used   Substance and Sexual Activity   • Alcohol use:  Yes   • Drug use: No   • Sexual activity: Yes     Partners: Male     Birth control/protection: Post-menopausal   Other Topics Concern   • Not on file   Social History Narrative   • Not on file     Social Determinants of Health     Financial Resource Strain: Not on file   Food Insecurity: Not on file   Transportation Needs: Not on file   Physical Activity: Not on file   Stress: Not on file   Social Connections: Not on file   Intimate Partner Violence: Not on file   Housing Stability: Not on file     Past Medical History:   Diagnosis Date   • Hypercholesterolemia    • Palpitations    • [facility]ox[facility]      Past Surgical History:   Procedure Laterality Date   • BREAST BIOPSY Right 03/03/2003    stereotactic core bx   • DILATION AND CURETTAGE OF UTERUS  2003   • ENDOMETRIAL ABLATION  11/2009   • OOPHORECTOMY Right 04/2012    Robotic assisted laparoscopic RSO, excision endometriosis, elevated      No Known Allergies    Current Outpatient Medications:   •  Calcium Carb-Cholecalciferol 1000-800 MG-UNIT TABS, Take 1 tablet by mouth daily, Disp: , Rfl:   •  cetirizine (ZyrTEC) 10 mg tablet, Take 10 mg by mouth daily, Disp: , Rfl:   •  Cyanocobalamin (VITAMIN B 12 PO), Take by mouth, Disp: , Rfl:   •  diltiazem (CARDIZEM CD) 120 mg 24 hr capsule, TAKE 1 CAPSULE DAILY, Disp: 90 capsule, Rfl: 3  •  Magnesium 100 MG CAPS, Take by mouth, Disp: , Rfl:   •  Misc Natural Products (OSTEO BI-FLEX/5-LOXIN ADVANCED PO), Take by mouth, Disp: , Rfl:   •  niacin 500 mg tablet, Take 500 mg by mouth daily with breakfast, Disp: , Rfl:   •  Omega-3 Fatty Acids (FISH OIL) 645 MG CAPS, Take 1 capsule by mouth 2 (two) times a day , Disp: , Rfl:   •  tobramycin-dexamethasone (TOBRADEX) ophthalmic ointment, Administer 0 5 inches to both eyes 3 (three) times a day for 7 days, Disp: 3 5 g, Rfl: 0  •  valACYclovir (VALTREX) 1,000 mg tablet, Take 1 tablet by mouth 3 (three) times a day as needed, Disp: , Rfl:   •  MULTIPLE VITAMINS PO, Take by mouth (Patient not taking: Reported on 12/14/2022), Disp: , Rfl:     Review of Systems  Constitutional:     Denies fever, chills ,fatigue ,weakness ,weight loss, weight gain     ENT: Denies earache ,loss of hearing ,nosebleed, nasal discharge,nasal congestion ,sore throat ,hoarseness+ right eyelid swelling with a papule in lid  Pulmonary: Denies shortness of breath ,cough  ,dyspnea on exertion, orthopnea  ,PND   Cardiovascular:  Denies bradycardia , tachycardia  ,palpations, lower extremity edema leg, claudication  Breast:  Denies new or changing breast lumps ,nipple discharge ,nipple changes  Abdomen:  Denies abdominal pain , anorexia , indigestion, nausea, vomiting, constipation, diarrhea  Musculoskeletal: Denies myalgias, arthralgias, joint swelling, joint stiffness , limb pain, limb swelling  Gu: denies dysuria, polyuria  Skin: Denies skin rash, skin lesion, skin wound, itching, dry skin  Neuro: Denies headache, numbness, tingling, confusion, loss of consciousness, dizziness, vertigo  Psychiatric: Denies feelings of depression, suicidal ideation, anxiety, sleep disturbances    OBJECTIVE  /72 (BP Location: Right arm, Patient Position: Sitting, Cuff Size: Large)   Pulse 60   Temp (!) 97 4 °F (36 3 °C)   Resp 16   Ht 5' 5" (1 651 m)   Wt 101 kg (222 lb)   LMP 02/28/2019 (Exact Date)   SpO2 98%   BMI 36 94 kg/m²   Constitutional:   NAD, well appearing and well nourished      ENT:   Conjunctiva and lids:+ right upper eyelid edema, erythema with a chalazion in center  Pupils and iris: BELEM bilaterally    External inspection of ears and nose: normal without deformities or discharge  Otoscopic exam: Canals patent without erythema  Nasal mucosa, septum and turbinates: Normal or edema or discharge         Oropharynx:  Moist mucosa, normal tongue and tonsils without lesions  No erythema        Pulmonary:Respiratory effort normal rate and rhythm, no increased work of breathing   Auscultation of lungs:  Clear bilaterally with no adventitious breath sounds       Cardiovascular: regular rate and rhythm, S1 and S2, no murmur, no edema and/or varicosities of LE      Abdomen: Soft and non-distended     Positive bowel sounds      No heptomegaly or splenomegaly      Gu: no suprapubic tenderness or CVA tenderness, no urethral discharge  Lymphatic:  No anterior or posterior cervical lymphadenopathy         Musculoskeletal:  Gait and station: Normal gait      Digits and nails normal without clubbing or cyanosis       Inspection/palpation of joints, bones, and muscles:  No joint tenderness, swelling, full active and passive range of motion Skin: Normal skin turgor and no rashes      Neuro:    Normal reflexes     Psych:   alert and oriented to person, place and time     normal mood and affect       Assessment/Plan:Diagnoses and all orders for this visit:    Hordeolum externum, unspecified laterality  Comments:  Pt to apply warm compresses tid  Orders:  -     tobramycin-dexamethasone (TOBRADEX) ophthalmic ointment; Administer 0 5 inches to both eyes 3 (three) times a day for 7 days    Other orders  -     Misc Natural Products (OSTEO BI-FLEX/5-LOXIN ADVANCED PO); Take by mouth        Reviewed with patient plan to treat with above plan      Patient instructed to call in 72 hours if not feeling better or if symptoms worsen

## 2023-02-24 ENCOUNTER — TELEPHONE (OUTPATIENT)
Dept: GASTROENTEROLOGY | Facility: CLINIC | Age: 58
End: 2023-02-24

## 2023-02-24 NOTE — TELEPHONE ENCOUNTER
Patients GI provider:  Dr Mariah Stout    Number to return call: [phone]     Reason for call: Pt called in requesting that her prep and prep information be sent out for her        Scheduled procedure/appointment date if applicable: 5/75/4958

## 2023-02-27 DIAGNOSIS — K58.9 IRRITABLE BOWEL SYNDROME, UNSPECIFIED TYPE: Primary | ICD-10-CM

## 2023-02-27 RX ORDER — SODIUM PICOSULFATE, MAGNESIUM OXIDE, AND ANHYDROUS CITRIC ACID 10; 3.5; 12 MG/160ML; G/160ML; G/160ML
LIQUID ORAL
Qty: 320 ML | Refills: 0 | Status: SHIPPED | OUTPATIENT
Start: 2023-02-27 | End: 2023-03-06

## 2023-02-27 NOTE — TELEPHONE ENCOUNTER
Called patient to confirm procedure  We went over the different prep  She choose clenpiq  I informed her it appeared her insurance would not pay for this but I will send her a coupon with her mailed prep,Patient agreed to this and wished to continue

## 2023-03-06 ENCOUNTER — TELEPHONE (OUTPATIENT)
Dept: OTHER | Facility: OTHER | Age: 58
End: 2023-03-06

## 2023-03-06 ENCOUNTER — TELEPHONE (OUTPATIENT)
Dept: GASTROENTEROLOGY | Facility: CLINIC | Age: 58
End: 2023-03-06

## 2023-03-06 DIAGNOSIS — Z12.11 SCREENING FOR COLON CANCER: Primary | ICD-10-CM

## 2023-03-06 RX ORDER — SODIUM, POTASSIUM,MAG SULFATES 17.5-3.13G
SOLUTION, RECONSTITUTED, ORAL ORAL
Qty: 354 ML | Refills: 0 | Status: SHIPPED | OUTPATIENT
Start: 2023-03-06 | End: 2023-03-06 | Stop reason: CLARIF

## 2023-03-06 NOTE — TELEPHONE ENCOUNTER
Patient with colonoscopy 3/14  Patient states she was ordered cleniq prep  Patient states the prep is expensive, her insurance covers suprep she wants to know if this can be ordered instead  Please follow up

## 2023-03-06 NOTE — TELEPHONE ENCOUNTER
Patients GI provider:  Dr Sophie Duncan    Number to return call: [phone]    Reason for call: Pt calling in regards to prep medications that are not covered under her insurance  Patient states that the Sutab that is to be sent to her pharmacy is not covered  The covered medications are Suprep and Peg 3350   Please advise if one of the covered preps can be sent to patients pharmacy    Scheduled procedure/appointment date if applicable: Procedure 9/77/43

## 2023-03-13 ENCOUNTER — ANESTHESIA EVENT (OUTPATIENT)
Dept: ANESTHESIOLOGY | Facility: HOSPITAL | Age: 58
End: 2023-03-13

## 2023-03-13 ENCOUNTER — ANESTHESIA (OUTPATIENT)
Dept: ANESTHESIOLOGY | Facility: HOSPITAL | Age: 58
End: 2023-03-13

## 2023-03-13 RX ORDER — SODIUM CHLORIDE, SODIUM LACTATE, POTASSIUM CHLORIDE, CALCIUM CHLORIDE 600; 310; 30; 20 MG/100ML; MG/100ML; MG/100ML; MG/100ML
125 INJECTION, SOLUTION INTRAVENOUS CONTINUOUS
Status: CANCELLED | OUTPATIENT
Start: 2023-03-13

## 2023-03-13 RX ORDER — LIDOCAINE HYDROCHLORIDE 10 MG/ML
0.5 INJECTION, SOLUTION EPIDURAL; INFILTRATION; INTRACAUDAL; PERINEURAL ONCE AS NEEDED
Status: CANCELLED | OUTPATIENT
Start: 2023-03-13

## 2023-03-14 ENCOUNTER — ANESTHESIA EVENT (OUTPATIENT)
Dept: GASTROENTEROLOGY | Facility: AMBULARY SURGERY CENTER | Age: 58
End: 2023-03-14

## 2023-03-14 ENCOUNTER — HOSPITAL ENCOUNTER (OUTPATIENT)
Dept: GASTROENTEROLOGY | Facility: AMBULARY SURGERY CENTER | Age: 58
Setting detail: OUTPATIENT SURGERY
Discharge: HOME | End: 2023-03-14
Attending: INTERNAL MEDICINE

## 2023-03-14 ENCOUNTER — ANESTHESIA (OUTPATIENT)
Dept: GASTROENTEROLOGY | Facility: AMBULARY SURGERY CENTER | Age: 58
End: 2023-03-14

## 2023-03-14 VITALS
DIASTOLIC BLOOD PRESSURE: 73 MMHG | HEART RATE: 50 BPM | BODY MASS INDEX: 35.82 KG/M2 | OXYGEN SATURATION: 100 % | HEIGHT: 65 IN | SYSTOLIC BLOOD PRESSURE: 110 MMHG | TEMPERATURE: 97.8 F | WEIGHT: 215 LBS | RESPIRATION RATE: 16 BRPM

## 2023-03-14 DIAGNOSIS — Z12.11 SCREENING FOR COLON CANCER: ICD-10-CM

## 2023-03-14 RX ORDER — LIDOCAINE HYDROCHLORIDE 10 MG/ML
INJECTION, SOLUTION EPIDURAL; INFILTRATION; INTRACAUDAL; PERINEURAL AS NEEDED
Status: DISCONTINUED | OUTPATIENT
Start: 2023-03-14 | End: 2023-03-14

## 2023-03-14 RX ORDER — PROPOFOL 10 MG/ML
INJECTION, EMULSION INTRAVENOUS CONTINUOUS PRN
Status: DISCONTINUED | OUTPATIENT
Start: 2023-03-14 | End: 2023-03-14

## 2023-03-14 RX ORDER — SODIUM CHLORIDE, SODIUM LACTATE, POTASSIUM CHLORIDE, CALCIUM CHLORIDE 600; 310; 30; 20 MG/100ML; MG/100ML; MG/100ML; MG/100ML
INJECTION, SOLUTION INTRAVENOUS CONTINUOUS PRN
Status: DISCONTINUED | OUTPATIENT
Start: 2023-03-14 | End: 2023-03-14

## 2023-03-14 RX ORDER — PROPOFOL 10 MG/ML
INJECTION, EMULSION INTRAVENOUS AS NEEDED
Status: DISCONTINUED | OUTPATIENT
Start: 2023-03-14 | End: 2023-03-14

## 2023-03-14 RX ADMIN — PROPOFOL 150 MG: 10 INJECTION, EMULSION INTRAVENOUS at 12:14

## 2023-03-14 RX ADMIN — LIDOCAINE HYDROCHLORIDE 50 MG: 10 INJECTION, SOLUTION EPIDURAL; INFILTRATION; INTRACAUDAL at 12:14

## 2023-03-14 RX ADMIN — PROPOFOL 130 MCG/KG/MIN: 10 INJECTION, EMULSION INTRAVENOUS at 12:14

## 2023-03-14 RX ADMIN — SODIUM CHLORIDE, SODIUM LACTATE, POTASSIUM CHLORIDE, AND CALCIUM CHLORIDE: .6; .31; .03; .02 INJECTION, SOLUTION INTRAVENOUS at 12:10

## 2023-03-14 NOTE — H&P
History and Physical - [facility]  Siri Glasgow 65 y o  female MRN: [id number]                  HPI: Siri Glasgow is a 65y o  year old female who presents for colonoscopy for screening  REVIEW OF SYSTEMS: Per the HPI, and otherwise unremarkable      Historical Information   Past Medical History:   Diagnosis Date   • Hypercholesterolemia    • Palpitations    • Paroxysmal SVT (supraventricular tachycardia) (HCC)      Past Surgical History:   Procedure Laterality Date   • BREAST BIOPSY Right 03/03/2003    stereotactic core bx   • DILATION AND CURETTAGE OF UTERUS  2003   • ENDOMETRIAL ABLATION  11/2009   • OOPHORECTOMY Right 04/2012    Robotic assisted laparoscopic RSO, excision endometriosis, elevated      Social History   Social History     Substance and Sexual Activity   Alcohol Use Yes     Social History     Substance and Sexual Activity   Drug Use No     Social History     Tobacco Use   Smoking Status Former   Smokeless Tobacco Never   Tobacco Comments    quit in 25s      Family History   Problem Relation Age of Onset   • Cancer Mother    • Stroke Mother    • Dementia Mother    • Breast cancer Mother 59   • Hyperlipidemia Mother    • Arrhythmia Father    • Hypertension Father    • Atrial fibrillation Father    • Melanoma Sister 50        with mets   • No Known Problems Daughter    • No Known Problems Daughter    • No Known Problems Maternal Grandmother    • No Known Problems Maternal Grandfather    • No Known Problems Paternal Grandmother    • No Known Problems Paternal Grandfather    • No Known Problems Paternal Aunt    • No Known Problems Brother        Meds/Allergies       Current Outpatient Medications:   •  Calcium Carb-Cholecalciferol 1000-800 MG-UNIT TABS  •  cetirizine (ZyrTEC) 10 mg tablet  •  Cyanocobalamin (VITAMIN B 12 PO)  •  diltiazem (CARDIZEM CD) 120 mg 24 hr capsule  •  Misc Natural Products (OSTEO BI-FLEX/5-LOXIN ADVANCED PO)  •  Omega-3 Fatty Acids (FISH OIL) 645 MG CAPS  •  Magnesium 100 MG CAPS  •  MULTIPLE VITAMINS PO  •  niacin 500 mg tablet  •  valACYclovir (VALTREX) 1,000 mg tablet    No Known Allergies    Objective     LMP 02/28/2019 (Exact Date)       PHYSICAL EXAM    Gen: NAD  Head: NCAT  CV: RRR  CHEST: Clear  ABD: soft, NT/ND  EXT: no edema      ASSESSMENT/PLAN:  This is a 65y o  year old female here for colonoscopy, and she is stable and optimized for her procedure

## 2023-03-14 NOTE — ANESTHESIA PREPROCEDURE EVALUATION
Procedure:  COLONOSCOPY    Relevant Problems   CARDIO   (+) PAC (premature atrial contraction)   (+) PSVT (paroxysmal supraventricular tachycardia) (HCC)      MUSCULOSKELETAL   (+) Primary osteoarthritis of right knee                Procedure:  COLONOSCOPY    Relevant Problems   CARDIO   (+) PAC (premature atrial contraction)   (+) PSVT (paroxysmal supraventricular tachycardia) (HCC)      MUSCULOSKELETAL   (+) Primary osteoarthritis of right knee        Physical Exam    Airway    Mallampati score: II  TM Distance: >3 FB  Neck ROM: full     Dental   No notable dental hx     Cardiovascular      Pulmonary      Other Findings        Anesthesia Plan  ASA Score- 2     Anesthesia Type- IV sedation with anesthesia with ASA Monitors  Additional Monitors:   Airway Plan:           Plan Factors-Exercise tolerance (METS): >4 METS  Chart reviewed  EKG reviewed  Patient summary reviewed  Patient is not a current smoker  Induction- intravenous  Postoperative Plan-     Informed Consent- Anesthetic plan and risks discussed with patient and spouse  I personally reviewed this patient with the CRNA  Discussed and agreed on the Anesthesia Plan with the CRNA  Carmelina Loredo

## 2023-03-14 NOTE — ANESTHESIA PREPROCEDURE EVALUATION
How Severe Is Your Skin Lesion?: mild Procedure:  PRE-OP ONLY    Relevant Problems   CARDIO   (+) PAC (premature atrial contraction)   (+) PSVT (paroxysmal supraventricular tachycardia) (HCC)      MUSCULOSKELETAL   (+) Primary osteoarthritis of right knee             Anesthesia Plan  ASA Score- 2     Anesthesia Type- IV sedation with anesthesia with ASA Monitors  Additional Monitors:   Airway Plan:           Plan Factors-    Chart reviewed  Patient is not a current smoker  Induction- intravenous  Postoperative Plan-     Informed Consent- Anesthetic plan and risks discussed with patient  I personally reviewed this patient with the CRNA  Discussed and agreed on the Anesthesia Plan with the CRNA  Day Price Have Your Skin Lesions Been Treated?: not been treated Is This A New Presentation, Or A Follow-Up?: Growth Which Family Member (Optional)?: Grandfather

## 2023-03-14 NOTE — ANESTHESIA POSTPROCEDURE EVALUATION
Post-Op Assessment Note    CV Status:  Stable  Pain Score: 0    Pain management: adequate     Mental Status:  Arousable and sleepy   Hydration Status:  Stable and euvolemic   PONV Controlled:  Controlled   Airway Patency:  Patent and adequate      Post Op Vitals Reviewed: Yes      Staff: CRNA         No notable events documented      BP   101/66   Temp      Pulse  69   Resp   20   SpO2   98

## 2023-07-20 ENCOUNTER — CLINICAL SUPPORT (OUTPATIENT)
Dept: FAMILY MEDICINE CLINIC | Facility: CLINIC | Age: 58
End: 2023-07-20
Payer: COMMERCIAL

## 2023-07-20 DIAGNOSIS — Z23 IMMUNIZATION DUE: Primary | ICD-10-CM

## 2023-07-20 PROCEDURE — 90471 IMMUNIZATION ADMIN: CPT

## 2023-07-20 PROCEDURE — 90750 HZV VACC RECOMBINANT IM: CPT

## 2023-07-24 ENCOUNTER — TRANSCRIBE ORDERS (OUTPATIENT)
Dept: OBGYN CLINIC | Facility: CLINIC | Age: 58
End: 2023-07-24

## 2023-07-24 ENCOUNTER — TELEPHONE (OUTPATIENT)
Dept: OBGYN CLINIC | Facility: CLINIC | Age: 58
End: 2023-07-24

## 2023-07-24 DIAGNOSIS — N95.0 POSTMENOPAUSAL BLEEDING: Primary | ICD-10-CM

## 2023-08-04 ENCOUNTER — OFFICE VISIT (OUTPATIENT)
Dept: FAMILY MEDICINE CLINIC | Facility: CLINIC | Age: 58
End: 2023-08-04
Payer: COMMERCIAL

## 2023-08-04 VITALS — SYSTOLIC BLOOD PRESSURE: 116 MMHG | TEMPERATURE: 98.2 F | DIASTOLIC BLOOD PRESSURE: 82 MMHG

## 2023-08-04 DIAGNOSIS — Z11.52 ENCOUNTER FOR SCREENING FOR COVID-19: ICD-10-CM

## 2023-08-04 DIAGNOSIS — J01.90 ACUTE SINUSITIS, RECURRENCE NOT SPECIFIED, UNSPECIFIED LOCATION: Primary | ICD-10-CM

## 2023-08-04 LAB
SARS-COV-2 AG UPPER RESP QL IA: NEGATIVE
VALID CONTROL: NORMAL

## 2023-08-04 PROCEDURE — 87811 SARS-COV-2 COVID19 W/OPTIC: CPT | Performed by: FAMILY MEDICINE

## 2023-08-04 PROCEDURE — 99213 OFFICE O/P EST LOW 20 MIN: CPT | Performed by: FAMILY MEDICINE

## 2023-08-04 RX ORDER — PREDNISONE 10 MG/1
TABLET ORAL
Qty: 26 TABLET | Refills: 0 | Status: SHIPPED | OUTPATIENT
Start: 2023-08-04

## 2023-08-04 RX ORDER — CEFUROXIME AXETIL 500 MG/1
500 TABLET ORAL EVERY 12 HOURS SCHEDULED
Qty: 20 TABLET | Refills: 0 | Status: SHIPPED | OUTPATIENT
Start: 2023-08-04 | End: 2023-08-14

## 2023-08-05 NOTE — PROGRESS NOTES
Patient ID: Dixie Urbina is a 65 y.o. female. HPI: 65 y. o.female presenting with symptoms of sinus pain,pressure, nasal congestion, pnd dry cough , ear and throat pain. She tested negative for COVID today. SUBJECTIVE    Family History   Problem Relation Age of Onset   • Cancer Mother    • Stroke Mother    • Dementia Mother    • Breast cancer Mother 59   • Hyperlipidemia Mother    • Arrhythmia Father    • Hypertension Father    • Atrial fibrillation Father    • Melanoma Sister 50        with mets   • No Known Problems Brother    • No Known Problems Maternal Grandmother    • Cancer Maternal Grandfather    • No Known Problems Paternal Grandmother    • No Known Problems Paternal Grandfather    • No Known Problems Daughter    • No Known Problems Daughter    • No Known Problems Paternal Aunt      Social History     Socioeconomic History   • Marital status: /Civil Union     Spouse name: Not on file   • Number of children: Not on file   • Years of education: Not on file   • Highest education level: Not on file   Occupational History   • Not on file   Tobacco Use   • Smoking status: Former   • Smokeless tobacco: Never   • Tobacco comments:     quit in 25s    Vaping Use   • Vaping Use: Never used   Substance and Sexual Activity   • Alcohol use:  Yes   • Drug use: No   • Sexual activity: Yes     Partners: Male     Birth control/protection: Post-menopausal   Other Topics Concern   • Not on file   Social History Narrative   • Not on file     Social Determinants of Health     Financial Resource Strain: Not on file   Food Insecurity: Not on file   Transportation Needs: Not on file   Physical Activity: Not on file   Stress: Not on file   Social Connections: Not on file   Intimate Partner Violence: Not on file   Housing Stability: Not on file     Past Medical History:   Diagnosis Date   • Hypercholesterolemia    • Palpitations    • Paroxysmal SVT (supraventricular tachycardia) ([address])      Past Surgical History: Procedure Laterality Date   • BREAST BIOPSY Right 03/03/2003    stereotactic core bx   • DILATION AND CURETTAGE OF UTERUS  2003   • ENDOMETRIAL ABLATION  11/2009   • OOPHORECTOMY Right 04/2012    Robotic assisted laparoscopic RSO, excision endometriosis, elevated      No Known Allergies    Current Outpatient Medications:   •  Calcium Carb-Cholecalciferol 1000-800 MG-UNIT TABS, Take 1 tablet by mouth daily, Disp: , Rfl:   •  cefuroxime (CEFTIN) 500 mg tablet, Take 1 tablet (500 mg total) by mouth every 12 (twelve) hours for 10 days, Disp: 20 tablet, Rfl: 0  •  cetirizine (ZyrTEC) 10 mg tablet, Take 10 mg by mouth daily, Disp: , Rfl:   •  Cyanocobalamin (VITAMIN B 12 PO), Take by mouth, Disp: , Rfl:   •  diltiazem (CARDIZEM CD) 120 mg 24 hr capsule, TAKE 1 CAPSULE DAILY, Disp: 90 capsule, Rfl: 3  •  Magnesium 100 MG CAPS, Take by mouth, Disp: , Rfl:   •  Misc Natural Products (OSTEO BI-FLEX/5-LOXIN ADVANCED PO), Take by mouth, Disp: , Rfl:   •  Omega-3 Fatty Acids (FISH OIL) 645 MG CAPS, Take 1 capsule by mouth 2 (two) times a day , Disp: , Rfl:   •  predniSONE 10 mg tablet, 3 tabs po bid x2 days, then 2 tabs po bid x2 days, then 1 tab bid x2 days, then 1 daily until done., Disp: 26 tablet, Rfl: 0  •  valACYclovir (VALTREX) 1,000 mg tablet, Take 1 tablet by mouth 3 (three) times a day as needed, Disp: , Rfl:   •  niacin 500 mg tablet, Take 500 mg by mouth daily with breakfast (Patient not taking: Reported on 8/4/2023), Disp: , Rfl:     Review of Systems  Constitutional:     Denies fever, chills, fatigue, weakness ,weight loss, weight gain      ENT: Denies earache, loss of hearing, nosebleed, nasal discharge,but complains of nasal congestion, sore throat,hoarseness and sinus pain and pressure    Pulmonary: Denies shortness of breath ,cough , dyspnea on exertionon, orthopnea ,+ PND   Cardiovascular:  Denies bradycardia , tachycardia ,palpations, lower extremity, edema leg, claudication  Breast:  Denies new or changing breast lumps,  nipple discharge, nipple changes,  Abdomen:  Denies abdominal pain , anorexia ,indigestion, nausea ,vomiting, constipation , diarrhea  Musculoskeletal: Denies myalgias, arthralgias, joint swelling, joint stiffness ,limb pain, limb swelling  Lymph:+ swollen glands  Gu: no dysuria or urinary frequency  Skin: Denies skin rash, skin lesion, skin wound, itching,dry skin  Neuro: Denies headache, numbness, tingling, confusion, loss of consciousness, dizziness ,vertigo  Psychiatric: Denies feelings of depression, suicidal ideation, anxiety, sleep disturbances    OBJECTIVE  /82   Temp 98.2 °F (36.8 °C)   LMP 02/28/2019 (Exact Date)   Constitutional:   NAD, well appearing and well nourished      ENT:   Conjunctiva and lids: no injection, edema, or discharge    Pupils and iris: BELEM bilaterally   External inspection of ears and nose: normal without deformities or discharge. Otoscopic exam: Canals patent ; tm are dull, with with erythem and effusions  ENasal mucosa, septum and turbinates: Turbinae injection with discharge   Oropharynx:  Moist mucosa, normal tongue and tonsils without lesions. Erythema and injection  of post pharynx with pnd      Pulmonary:Respiratory effort normal rate and rhythm, no increased work of breathing.  Auscultation of lungs:  Clear bilaterally with no adventitious breath sounds       Cardiovascular: regular rate and rhythm, S1 and S2, no murmur, no edema and/or varicosities of LE      Abdomen: Soft and non-distended    Positive bowel sounds    No heptomegaly or splenomegaly    Lymphatic: Anterior  cervical lymphadenopathy         Muscskeletal:  Gait and station: Normal gait     Digits and nails normal without clubbing or cyanosis     Inspection/palpation of joints, bones, and muscles:  No joint tenderness, swelling, full active and passive range of motion      Gu: no suprabubic tenderness, CVA tenderness or urethral discharge  Skin: Normal skin turgor and no rashes Neuro:    Normal reflexes   Psych:   alert and oriented to person, place and time  normal mood and affect      Assessment/Plan:Diagnoses and all orders for this visit:    Acute sinusitis, recurrence not specified, unspecified location  -     predniSONE 10 mg tablet; 3 tabs po bid x2 days, then 2 tabs po bid x2 days, then 1 tab bid x2 days, then 1 daily until done. -     cefuroxime (CEFTIN) 500 mg tablet; Take 1 tablet (500 mg total) by mouth every 12 (twelve) hours for 10 days    Encounter for screening for COVID-19  -     POCT Rapid Covid Ag        Reviewed with patient plan to treat with above plan.     Patient instructed to call in 72 hours if not feeling better or if symptoms worsen 1 do think it will and [place]

## 2023-08-18 ENCOUNTER — HOSPITAL ENCOUNTER (OUTPATIENT)
Dept: RADIOLOGY | Age: 58
Discharge: HOME | End: 2023-08-18
Payer: COMMERCIAL

## 2023-08-18 DIAGNOSIS — N95.0 POSTMENOPAUSAL BLEEDING: ICD-10-CM

## 2023-08-18 PROCEDURE — 76830 TRANSVAGINAL US NON-OB: CPT

## 2023-08-18 PROCEDURE — 76856 US EXAM PELVIC COMPLETE: CPT

## 2023-08-24 ENCOUNTER — TELEPHONE (OUTPATIENT)
Dept: FAMILY MEDICINE CLINIC | Facility: CLINIC | Age: 58
End: 2023-08-24

## 2023-08-24 NOTE — TELEPHONE ENCOUNTER
Patient was on an overnight flight yesterday. Both ears are now clogged effecting her hearing. She said they got worse on the bus ride home from [facility]. She took Sudafed, flonase, and gargled with salt water without relief. Pt is scheduled for tomorrow morning and wants to know if there is anything she can do in the meantime to help?

## 2023-08-25 ENCOUNTER — OFFICE VISIT (OUTPATIENT)
Dept: FAMILY MEDICINE CLINIC | Facility: CLINIC | Age: 58
End: 2023-08-25
Payer: COMMERCIAL

## 2023-08-25 VITALS
BODY MASS INDEX: 36.62 KG/M2 | WEIGHT: 219.8 LBS | HEIGHT: 65 IN | TEMPERATURE: 97.6 F | HEART RATE: 62 BPM | OXYGEN SATURATION: 97 % | DIASTOLIC BLOOD PRESSURE: 78 MMHG | SYSTOLIC BLOOD PRESSURE: 110 MMHG

## 2023-08-25 DIAGNOSIS — J06.9 UPPER RESPIRATORY TRACT INFECTION, UNSPECIFIED TYPE: Primary | ICD-10-CM

## 2023-08-25 DIAGNOSIS — Z11.52 ENCOUNTER FOR SCREENING FOR COVID-19: ICD-10-CM

## 2023-08-25 LAB
SARS-COV-2 AG UPPER RESP QL IA: NEGATIVE
VALID CONTROL: NORMAL

## 2023-08-25 PROCEDURE — 99213 OFFICE O/P EST LOW 20 MIN: CPT | Performed by: FAMILY MEDICINE

## 2023-08-25 PROCEDURE — 87636 SARSCOV2 & INF A&B AMP PRB: CPT | Performed by: FAMILY MEDICINE

## 2023-08-25 PROCEDURE — 87811 SARS-COV-2 COVID19 W/OPTIC: CPT | Performed by: FAMILY MEDICINE

## 2023-08-25 RX ORDER — TOBRAMYCIN 3 MG/ML
1 SOLUTION/ DROPS OPHTHALMIC 4 TIMES DAILY
Qty: 5 ML | Refills: 0 | Status: SHIPPED | OUTPATIENT
Start: 2023-08-25 | End: 2023-09-01

## 2023-08-25 RX ORDER — AZITHROMYCIN 250 MG/1
TABLET, FILM COATED ORAL
Qty: 6 TABLET | Refills: 0 | Status: SHIPPED | OUTPATIENT
Start: 2023-08-25 | End: 2023-08-29

## 2023-08-25 RX ORDER — METHYLPREDNISOLONE 4 MG/1
TABLET ORAL
Qty: 21 EACH | Refills: 0 | Status: SHIPPED | OUTPATIENT
Start: 2023-08-25

## 2023-08-28 ENCOUNTER — TELEPHONE (OUTPATIENT)
Dept: OBGYN CLINIC | Facility: CLINIC | Age: 58
End: 2023-08-28

## 2023-08-28 NOTE — TELEPHONE ENCOUNTER
Patient informed pelvic US results with borderline endometrial lining thickness. Patient has had 3 episodes of very light pink spotting on pantiliner & with wiping since seen here for yearly 11/2022, last time several days ago, no cramping. She did not have [address] done but will have done now.

## 2023-08-28 NOTE — PROGRESS NOTES
Patient ID: Genevieve Loaiza is a 65 y.o. female. HPI: 65 y. o.female presenting with 4 day history of sore throat, nasal congestion, ear pain,pnd and cough. Pt denies any fever, chills, or body aches. COVID test done today in the office was negative. Her eyes have purulent discharge in the morning and are slightly itchy and red. SUBJECTIVE    Family History   Problem Relation Age of Onset   • Cancer Mother    • Stroke Mother    • Dementia Mother    • Breast cancer Mother 59   • Hyperlipidemia Mother    • Arrhythmia Father    • Hypertension Father    • Atrial fibrillation Father    • Melanoma Sister 50        with mets   • No Known Problems Brother    • No Known Problems Maternal Grandmother    • Cancer Maternal Grandfather    • No Known Problems Paternal Grandmother    • No Known Problems Paternal Grandfather    • No Known Problems Daughter    • No Known Problems Daughter    • No Known Problems Paternal Aunt      Social History     Socioeconomic History   • Marital status: /Civil Union     Spouse name: Not on file   • Number of children: Not on file   • Years of education: Not on file   • Highest education level: Not on file   Occupational History   • Not on file   Tobacco Use   • Smoking status: Former   • Smokeless tobacco: Never   • Tobacco comments:     quit in 25s    Vaping Use   • Vaping Use: Never used   Substance and Sexual Activity   • Alcohol use:  Yes   • Drug use: No   • Sexual activity: Yes     Partners: Male     Birth control/protection: Post-menopausal   Other Topics Concern   • Not on file   Social History Narrative   • Not on file     Social Determinants of Health     Financial Resource Strain: Not on file   Food Insecurity: Not on file   Transportation Needs: Not on file   Physical Activity: Not on file   Stress: Not on file   Social Connections: Not on file   Intimate Partner Violence: Not on file   Housing Stability: Not on file     Past Medical History:   Diagnosis Date   • Hypercholesterolemia    • Palpitations    • Paroxysmal SVT (supraventricular tachycardia) (HCC)      Past Surgical History:   Procedure Laterality Date   • BREAST BIOPSY Right 03/03/2003    stereotactic core bx   • DILATION AND CURETTAGE OF UTERUS  2003   • ENDOMETRIAL ABLATION  11/2009   • OOPHORECTOMY Right 04/2012    Robotic assisted laparoscopic RSO, excision endometriosis, elevated      No Known Allergies    Current Outpatient Medications:   •  azithromycin (ZITHROMAX) 250 mg tablet, Take 2 tablets today then 1 tablet daily x 4 days, Disp: 6 tablet, Rfl: 0  •  Calcium Carb-Cholecalciferol 1000-800 MG-UNIT TABS, Take 1 tablet by mouth daily, Disp: , Rfl:   •  cetirizine (ZyrTEC) 10 mg tablet, Take 10 mg by mouth daily, Disp: , Rfl:   •  Cyanocobalamin (VITAMIN B 12 PO), Take by mouth, Disp: , Rfl:   •  diltiazem (CARDIZEM CD) 120 mg 24 hr capsule, TAKE 1 CAPSULE DAILY, Disp: 90 capsule, Rfl: 3  •  Magnesium 100 MG CAPS, Take by mouth, Disp: , Rfl:   •  methylPREDNISolone 4 MG tablet therapy pack, Use as directed on package, Disp: 21 each, Rfl: 0  •  Misc Natural Products (OSTEO BI-FLEX/5-LOXIN ADVANCED PO), Take by mouth, Disp: , Rfl:   •  Omega-3 Fatty Acids (FISH OIL) 645 MG CAPS, Take 1 capsule by mouth 2 (two) times a day , Disp: , Rfl:   •  tobramycin (Tobrex) 0.3 % SOLN, Administer 1 drop to both eyes 4 (four) times a day for 7 days, Disp: 5 mL, Rfl: 0  •  valACYclovir (VALTREX) 1,000 mg tablet, Take 1 tablet by mouth 3 (three) times a day as needed, Disp: , Rfl:     Review of Systems  Constitutional:     Denies fever, chills ,fatigue ,weakness ,weight loss, weight gain     ENT: Denies loss of hearing ,nosebleed, nasal discharge ,hoarseness; but admits to nasal congestion , sore throat and ear pain.   Positive red eyes with purulent discharge and itching bilaterally  Pulmonary: Denies shortness of breath ,dyspnea on exertion, orthopnea ; but admits to cough and pnd  Cardiovascular:  Denies bradycardia , tachycardia  ,palpations, lower extremity edema leg, claudication  Breast:  Denies new or changing breast lumps ,nipple discharge ,nipple changes  Abdomen:  Denies abdominal pain , anorexia , indigestion, nausea, vomiting, constipation, diarrhea  Musculoskeletal: Denies myalgias, arthralgias, joint swelling, joint stiffness , limb pain, limb swelling  Lymph: + swollen glands  Gu: Denies polyuria or dysuria  Skin: Denies skin rash, skin lesion, skin wound, itching, dry skin  Neuro: Denies headache, numbness, tingling, confusion, loss of consciousness, dizziness, vertigo  Psychiatric: Denies feelings of depression, suicidal ideation, anxiety, sleep disturbances    OBJECTIVE  /78   Pulse 62   Temp 97.6 °F (36.4 °C)   Ht 5' 5" (1.651 m)   Wt 99.7 kg (219 lb 12.8 oz)   LMP 02/28/2019 (Exact Date)   SpO2 97%   BMI 36.58 kg/m²   Constitutional:   NAD, well appearing and well nourished     ENT:   Conjunctiva and lids:+ injection, edema, or discharge    Pupils and iris: BELEM bilaterally; sclera is pink and there is purulent discharge lining of the lower tarsal edge. External inspection of ears and nose: normal without deformities or discharge. Otoscopic exam: Canals patent without erythema, tm dull and erythematous, effusions bilaterally   Nasal mucosa, septum and turbinates: + turbinate injection, nasal discharge         Oropharynx:  Moist mucosa, normal tongue and tonsils without lesions. + erythema and injection of posterior pharynx with pnd    Pulmonary:Respiratory effort normal rate and rhythm, no increased work of breathing.  Auscultation of lungs:  Clear bilaterally with no adventitious breath sounds     Cardiovascular: regular rate and rhythm, S1 and S2, no murmur, no edema and/or varicosities of LE     Abdomen: Soft and nontender with + bowel sounds  No heptomegaly or splenomegaly     Gu: no suprapubic tenderness or CVA tenderness  Lymphatic: + anterior  cervical lymphadenopathy Musculoskeletal:  Gait and station: Normal gait      Digits and nails normal without clubbing or cyanosis      Inspection/palpation of joints, bones, and muscles:  No joint tenderness, swelling, full active and passive range of motion       Skin: Normal skin turgor and no rashes      Neuro:    Normal reflexes  Pych:   alert and oriented to person, place and time     normal mood and affect      Assessment/Plan:Diagnoses and all orders for this visit:    Upper respiratory tract infection, unspecified type  -     azithromycin (ZITHROMAX) 250 mg tablet; Take 2 tablets today then 1 tablet daily x 4 days  -     tobramycin (Tobrex) 0.3 % SOLN; Administer 1 drop to both eyes 4 (four) times a day for 7 days  -     methylPREDNISolone 4 MG tablet therapy pack; Use as directed on package    Encounter for screening for COVID-19  -     POCT Rapid Covid Ag  -     Covid/Flu- Office Collect        Reviewed with patient plan to treat with above plan.      Patient instructed to call in 72 hours if not feeling better or if symptoms worsen

## 2023-08-28 NOTE — TELEPHONE ENCOUNTER
----- Message from Nidia Benitez DO sent at 8/27/2023  6:04 PM EDT -----  Shala, Please call the patient. This US was ordered 11/2022. Please find out if there has been recent bleeding. I also ordered an [facility] on 11/2022?? Ever done???  Thx

## 2023-08-30 NOTE — TELEPHONE ENCOUNTER
Patient informed of KA's recommendation - appointment scheduled for EBX with pre-instructions given.

## 2023-09-01 ENCOUNTER — APPOINTMENT (OUTPATIENT)
Dept: LAB | Facility: CLINIC | Age: 58
End: 2023-09-01
Payer: COMMERCIAL

## 2023-09-01 DIAGNOSIS — R63.5 WEIGHT GAIN: ICD-10-CM

## 2023-09-01 DIAGNOSIS — Z00.00 ANNUAL PHYSICAL EXAM: ICD-10-CM

## 2023-09-01 LAB — FSH SERPL-ACNC: 85.8 MIU/ML

## 2023-09-06 ENCOUNTER — HOSPITAL ENCOUNTER (OUTPATIENT)
Dept: RADIOLOGY | Facility: MEDICAL CENTER | Age: 58
Discharge: HOME | End: 2023-09-06
Payer: COMMERCIAL

## 2023-09-06 DIAGNOSIS — M81.0 AGE-RELATED OSTEOPOROSIS WITHOUT CURRENT PATHOLOGICAL FRACTURE: ICD-10-CM

## 2023-09-06 DIAGNOSIS — Z13.820 SCREENING FOR OSTEOPOROSIS: ICD-10-CM

## 2023-09-06 PROCEDURE — 77080 DXA BONE DENSITY AXIAL: CPT

## 2023-09-07 ENCOUNTER — PROCEDURE VISIT (OUTPATIENT)
Dept: OBGYN CLINIC | Facility: CLINIC | Age: 58
End: 2023-09-07
Payer: COMMERCIAL

## 2023-09-07 VITALS
DIASTOLIC BLOOD PRESSURE: 76 MMHG | HEIGHT: 65 IN | WEIGHT: 223.4 LBS | SYSTOLIC BLOOD PRESSURE: 122 MMHG | BODY MASS INDEX: 37.22 KG/M2

## 2023-09-07 DIAGNOSIS — N95.0 POSTMENOPAUSAL BLEEDING: Primary | ICD-10-CM

## 2023-09-07 DIAGNOSIS — Z98.890 HISTORY OF ENDOMETRIAL ABLATION: ICD-10-CM

## 2023-09-07 DIAGNOSIS — N83.202 CYST OF LEFT OVARY: ICD-10-CM

## 2023-09-07 DIAGNOSIS — M85.80 OSTEOPENIA, UNSPECIFIED LOCATION: Primary | ICD-10-CM

## 2023-09-07 PROCEDURE — 58100 BIOPSY OF UTERUS LINING: CPT | Performed by: OBSTETRICS & GYNECOLOGY

## 2023-09-07 RX ORDER — RALOXIFENE HYDROCHLORIDE 60 MG/1
60 TABLET, FILM COATED ORAL DAILY
Qty: 30 TABLET | Refills: 3 | Status: SHIPPED | OUTPATIENT
Start: 2023-09-07

## 2023-09-07 NOTE — PROGRESS NOTES
Assessment/Plan:  Endometrial biopsy unsuccessful due to cervical stenosis. discussed options. At this point we will repeat pelvic ultrasound in 2 to 3 months follow-up small ovarian cysts and reassess endometrial stripe. Discussed if another episode of spotting and pending ultrasound results, consider exam under anesthesia, hysteroscopy D&C. She will return to office 2023 for annual visit and follow-up pelvic ultrasound. All questions answered. No problem-specific Assessment & Plan notes found for this encounter. Diagnoses and all orders for this visit:    Postmenopausal bleeding    Cyst of left ovary  -     US pelvis complete w transvaginal; Future    History of endometrial ablation          Subjective:      Patient ID: Mary Armenta is a 65 y.o. female. HPI      This is a pleasant 52-year-old female P3 ( x3, age 28, 30, 33) presents complaining of vaginal pink spotting described as light. She has had increase in hot flashes over the last several months. She denies any changes in bowel or bladder function. Recent FSH level= 85. Reviewed recent pelvic ultrasound revealing 1.2 cm left simple ovarian cyst, endometrial stripe 6 mm. Her GYN history significant for robotic assisted right oophorectomy, excision of endometriosis in , endometrial ablation in . She went through menopause at age 49. Hot flashes ++++    Light pink spotting  X3 episodes since 2022    FSH= 85     pelvic ultrasound retroverted uterus 7 x 3 x 4 cm, endometrial stripe 6 mm, simple left paraovarian cyst 1.2 cm      The following portions of the patient's history were reviewed and updated as appropriate: allergies, current medications, past family history, past medical history, past social history, past surgical history and problem list.    Review of Systems   Constitutional: Negative for fatigue, fever and unexpected weight change.    Respiratory: Negative for cough, chest tightness, shortness of breath and wheezing. Cardiovascular: Negative. Negative for chest pain and palpitations. Gastrointestinal: Negative. Negative for abdominal distention, abdominal pain, blood in stool, constipation, diarrhea, nausea and vomiting. Genitourinary: Negative. Negative for difficulty urinating, dyspareunia, dysuria, flank pain, frequency, genital sores, hematuria, pelvic pain, urgency, vaginal bleeding, vaginal discharge and vaginal pain. Skin: Negative for rash. Objective:      /76   Ht 5' 5" (1.651 m)   Wt 101 kg (223 lb 6.4 oz)   LMP 02/28/2019 (Exact Date)   BMI 37.18 kg/m²          Physical Exam        Endometrial biopsy    Date/Time: 9/7/2023 1:15 PM    Performed by: Jackelin Longo DO  Authorized by: Jackelin Longo DO  Universal Protocol:  Consent: Verbal consent obtained. Consent given by: patient  Patient understanding: patient states understanding of the procedure being performed  Patient identity confirmed: verbally with patient      Indication:     Indications: Post-menopausal bleeding      Chronicity of post-menopausal bleeding:  Recurrent    Progression of post-menopausal bleeding:  Unable to specify  Pre-procedure:     Premeds:  Ibuprofen  Procedure:     Procedure: endometrial biopsy with Pipelle      A bivalve speculum was placed in the vagina: yes      Cervix cleaned and prepped: yes      The cervix was dilated: yes      Uterus sounded: no      Patient tolerated procedure well with no complications: no    Findings:     Uterus size:  6-8 weeks    Cervix: stenotic    Comments:     Procedure comments:  Cervix was cleansed with Betadine solution. The anterior lip of the cervix was grasped using an Allis clamp. Internal cervical os difficult to dilate, patient difficulty tolerating. The procedure was aborted. All instruments removed from the vagina.

## 2023-10-11 ENCOUNTER — TELEPHONE (OUTPATIENT)
Age: 58
End: 2023-10-11

## 2023-10-11 DIAGNOSIS — R10.9 ABDOMINAL SPASMS: Primary | ICD-10-CM

## 2023-10-11 RX ORDER — HYOSCYAMINE SULFATE 0.125 MG
0.12 TABLET ORAL EVERY 4 HOURS PRN
Qty: 30 TABLET | Refills: 0 | Status: SHIPPED | OUTPATIENT
Start: 2023-10-11

## 2023-10-11 NOTE — TELEPHONE ENCOUNTER
Pt states she was dizzy just for the one day it is now resolved since she stopped the med. She said for her osteopenia she does not want any prescriptions due to the potential side effects. She is asking what she can take holistically to help. She is on calcium and states she has been since her 30's.

## 2023-10-11 NOTE — TELEPHONE ENCOUNTER
Patient states she is experience dizziness from taking raloxifene. She is asking if there are any alternatives she can take? She is also having IBS flare and was previously prescribed hyoscyamine by Dr. Ashlee Antonio . she is asking if this can be reordered as well? Patient made aware a visit may be needed before ordering new medications. Please f/u with patient. Thanks !

## 2023-10-26 ENCOUNTER — VBI (OUTPATIENT)
Dept: ADMINISTRATIVE | Facility: OTHER | Age: 58
End: 2023-10-26

## 2023-10-27 ENCOUNTER — OFFICE VISIT (OUTPATIENT)
Dept: CARDIOLOGY CLINIC | Facility: CLINIC | Age: 58
End: 2023-10-27
Payer: COMMERCIAL

## 2023-10-27 VITALS
HEIGHT: 65 IN | DIASTOLIC BLOOD PRESSURE: 80 MMHG | WEIGHT: 219 LBS | SYSTOLIC BLOOD PRESSURE: 104 MMHG | BODY MASS INDEX: 36.49 KG/M2 | HEART RATE: 66 BPM

## 2023-10-27 DIAGNOSIS — I49.1 PAC (PREMATURE ATRIAL CONTRACTION): ICD-10-CM

## 2023-10-27 DIAGNOSIS — I47.10 PSVT (PAROXYSMAL SUPRAVENTRICULAR TACHYCARDIA): Primary | ICD-10-CM

## 2023-10-27 DIAGNOSIS — E78.5 DYSLIPIDEMIA: ICD-10-CM

## 2023-10-27 PROCEDURE — 99214 OFFICE O/P EST MOD 30 MIN: CPT | Performed by: INTERNAL MEDICINE

## 2023-10-27 PROCEDURE — 93000 ELECTROCARDIOGRAM COMPLETE: CPT | Performed by: INTERNAL MEDICINE

## 2023-11-03 ENCOUNTER — HOSPITAL ENCOUNTER (OUTPATIENT)
Dept: RADIOLOGY | Age: 58
Discharge: HOME | End: 2023-11-03
Payer: COMMERCIAL

## 2023-11-03 DIAGNOSIS — N83.202 CYST OF LEFT OVARY: ICD-10-CM

## 2023-11-03 PROCEDURE — 76856 US EXAM PELVIC COMPLETE: CPT

## 2023-11-03 PROCEDURE — 76830 TRANSVAGINAL US NON-OB: CPT

## 2023-11-13 DIAGNOSIS — Z12.31 ENCOUNTER FOR SCREENING MAMMOGRAM FOR MALIGNANT NEOPLASM OF BREAST: Primary | ICD-10-CM

## 2023-11-15 ENCOUNTER — ANNUAL EXAM (OUTPATIENT)
Dept: OBGYN CLINIC | Facility: CLINIC | Age: 58
End: 2023-11-15
Payer: COMMERCIAL

## 2023-11-15 VITALS
SYSTOLIC BLOOD PRESSURE: 120 MMHG | WEIGHT: 222.8 LBS | BODY MASS INDEX: 37.12 KG/M2 | DIASTOLIC BLOOD PRESSURE: 74 MMHG | HEIGHT: 65 IN

## 2023-11-15 DIAGNOSIS — Z12.31 ENCOUNTER FOR SCREENING MAMMOGRAM FOR MALIGNANT NEOPLASM OF BREAST: ICD-10-CM

## 2023-11-15 DIAGNOSIS — Z01.419 ENCOUNTER FOR ANNUAL ROUTINE GYNECOLOGICAL EXAMINATION: Primary | ICD-10-CM

## 2023-11-15 DIAGNOSIS — N39.46 MIXED STRESS AND URGE URINARY INCONTINENCE: ICD-10-CM

## 2023-11-15 PROCEDURE — S0612 ANNUAL GYNECOLOGICAL EXAMINA: HCPCS | Performed by: OBSTETRICS & GYNECOLOGY

## 2023-11-15 NOTE — PROGRESS NOTES
Assessment/Plan:  Pap deferred due to low risk status. Encouraged self breast examination as well as calcium supplementation. Continue annual mammogram.  Requesting referral for pelvic floor physical therapy, urinary incontinence. Reviewed colon cancer screening, up-to-date. Implications of small ovarian cysts reviewed. Reviewed if any further bleeding would then recommend further evaluation with possible D&C versus definitive surgery, office endometrial biopsy unsuccessful. All questions answered. Return to office in 1 year or as needed  No problem-specific Assessment & Plan notes found for this encounter. Diagnoses and all orders for this visit:    Encounter for annual routine gynecological examination    Encounter for screening mammogram for malignant neoplasm of breast  -     Mammo screening bilateral w 3d & cad; Future    Mixed stress and urge urinary incontinence  -     Ambulatory Referral to Physical Therapy; Future          Subjective:      Patient ID: Harriett Kathleen is a 63 y.o. female. HPI    This is a pleasant 46-year-old female P3 ( x3, age 28, 30, 33) presents for her GYN exam.  She went through menopause at age 49. She has never been on hormone replacement therapy. She had an episode of postmenopausal spotting with work-up including pelvic ultrasound and attempted endometrial biopsy which was unsuccessful due to cervical stenosis. Her GYN history significant for robotic assisted right oophorectomy/excision of endometriosis in , endometrial ablation in     More recently she has had increase in hot flashes since her episode of spotting.   There has been no further spotting over the last 2 months.    2023 dexa osteopenia start evista    Colon 3/2023 +polyp f/u 7 yrs    2022 pap     2023 US 1.5  simple lt ov cyst    2023 failed EBx stenosis    The following portions of the patient's history were reviewed and updated as appropriate: allergies, current medications, past family history, past medical history, past social history, past surgical history, and problem list.    Review of Systems   Constitutional:  Negative for fatigue, fever and unexpected weight change. Respiratory:  Negative for cough, chest tightness, shortness of breath and wheezing. Cardiovascular: Negative. Negative for chest pain and palpitations. Gastrointestinal: Negative. Negative for abdominal distention, abdominal pain, blood in stool, constipation, diarrhea, nausea and vomiting. Genitourinary: Negative. Negative for difficulty urinating, dyspareunia, dysuria, flank pain, frequency, genital sores, hematuria, pelvic pain, urgency, vaginal bleeding, vaginal discharge and vaginal pain. Skin:  Negative for rash. Objective:      /74   Ht 5' 5" (1.651 m)   Wt 101 kg (222 lb 12.8 oz)   LMP 02/28/2019 (Exact Date)   BMI 37.08 kg/m²          Physical Exam  Constitutional:       Appearance: Normal appearance. She is well-developed. HENT:      Head: Normocephalic and atraumatic. Cardiovascular:      Rate and Rhythm: Normal rate and regular rhythm. Pulmonary:      Effort: Pulmonary effort is normal.      Breath sounds: Normal breath sounds. Chest:   Breasts:     Right: No inverted nipple, mass, nipple discharge, skin change or tenderness. Left: No inverted nipple, mass, nipple discharge, skin change or tenderness. Abdominal:      General: Bowel sounds are normal. There is no distension. Palpations: Abdomen is soft. Tenderness: There is no abdominal tenderness. There is no guarding or rebound. Genitourinary:     Labia:         Right: No rash, tenderness or lesion. Left: No rash, tenderness or lesion. Vagina: Normal. No signs of injury. No vaginal discharge or tenderness. Cervix: No cervical motion tenderness, discharge, friability, lesion or cervical bleeding. Uterus: Not enlarged, not fixed and not tender.        Adnexa:         Right: No mass, tenderness or fullness. Left: No mass, tenderness or fullness. Neurological:      Mental Status: She is alert.    Psychiatric:         Behavior: Behavior normal.         mobile

## 2024-01-10 ENCOUNTER — RA CDI HCC (OUTPATIENT)
Dept: OTHER | Facility: HOSPITAL | Age: 59
End: 2024-01-10

## 2024-01-16 DIAGNOSIS — I47.10 PSVT (PAROXYSMAL SUPRAVENTRICULAR TACHYCARDIA): ICD-10-CM

## 2024-01-16 RX ORDER — DILTIAZEM HYDROCHLORIDE 120 MG/1
CAPSULE, COATED, EXTENDED RELEASE ORAL
Qty: 90 CAPSULE | Refills: 3 | Status: SHIPPED | OUTPATIENT
Start: 2024-01-16

## 2024-01-19 ENCOUNTER — OFFICE VISIT (OUTPATIENT)
Dept: FAMILY MEDICINE CLINIC | Facility: CLINIC | Age: 59
End: 2024-01-19
Payer: COMMERCIAL

## 2024-01-19 VITALS
TEMPERATURE: 97.6 F | HEIGHT: 65 IN | BODY MASS INDEX: 37.42 KG/M2 | WEIGHT: 224.6 LBS | HEART RATE: 67 BPM | DIASTOLIC BLOOD PRESSURE: 78 MMHG | OXYGEN SATURATION: 98 % | SYSTOLIC BLOOD PRESSURE: 118 MMHG

## 2024-01-19 DIAGNOSIS — R63.5 WEIGHT GAIN: ICD-10-CM

## 2024-01-19 DIAGNOSIS — Z00.00 ANNUAL PHYSICAL EXAM: Primary | ICD-10-CM

## 2024-01-19 DIAGNOSIS — J30.89 PERENNIAL ALLERGIC RHINITIS: ICD-10-CM

## 2024-01-19 DIAGNOSIS — H69.93 EUSTACHIAN TUBE DYSFUNCTION, BILATERAL: ICD-10-CM

## 2024-01-19 PROCEDURE — 99396 PREV VISIT EST AGE 40-64: CPT | Performed by: FAMILY MEDICINE

## 2024-01-19 PROCEDURE — 99214 OFFICE O/P EST MOD 30 MIN: CPT | Performed by: FAMILY MEDICINE

## 2024-01-19 RX ORDER — MONTELUKAST SODIUM 10 MG/1
10 TABLET ORAL
Qty: 30 TABLET | Refills: 5 | Status: SHIPPED | OUTPATIENT
Start: 2024-01-19

## 2024-01-19 RX ORDER — VITAMIN B COMPLEX
1 CAPSULE ORAL DAILY
COMMUNITY

## 2024-01-19 RX ORDER — PREDNISONE 10 MG/1
TABLET ORAL
Qty: 26 TABLET | Refills: 0 | Status: SHIPPED | OUTPATIENT
Start: 2024-01-19

## 2024-01-19 NOTE — PROGRESS NOTES
ADULT ANNUAL PHYSICAL  [facility] MEDICINE [facility]    NAME: Veronica Herrera  AGE: 58 y.o. SEX: female  : 1965     DATE: 2024     Assessment and Plan:     Problem List Items Addressed This Visit    None      Immunizations and preventive care screenings were discussed with patient today. Appropriate education was printed on patient's after visit summary.    Counseling:  Exercise: the importance of regular exercise/physical activity was discussed. Recommend exercise 3-5 times per week for at least 30 minutes.          No follow-ups on file.     Chief Complaint:     Chief Complaint   Patient presents with    Annual Exam      History of Present Illness:     Adult Annual Physical   Patient here for a comprehensive physical exam. The patient reports no problems.    Diet and Physical Activity  Diet/Nutrition: well balanced diet.   Exercise: no formal exercise.      Depression Screening  PHQ-2/9 Depression Screening    Little interest or pleasure in doing things: 0 - not at all  Feeling down, depressed, or hopeless: 0 - not at all  PHQ-2 Score: 0  PHQ-2 Interpretation: Negative depression screen       General Health  Sleep: sleeps well.   Hearing: normal - bilateral.  Vision: no vision problems.   Dental: regular dental visits.       /GYN Health  Follows with gynecology? yes   Patient is: postmenopausal  Last menstrual period:   Contraceptive method: .    Advanced Care Planning  Do you have an advanced directive? no  Do you have a durable medical power of ? no     Review of Systems:     Review of Systems   Constitutional:  Positive for unexpected weight change. Negative for appetite change, chills and fever.        + weight gain   HENT:  Positive for postnasal drip and sneezing. Negative for ear pain, facial swelling, rhinorrhea, sinus pain, sore throat and trouble swallowing.         + ear pressure and pain   Eyes:  Negative for discharge and redness.    Respiratory:  Negative for chest tightness, shortness of breath and wheezing.    Cardiovascular:  Negative for chest pain and palpitations.   Gastrointestinal:  Negative for abdominal pain, diarrhea, nausea and vomiting.   Endocrine: Negative for polyuria.   Genitourinary:  Negative for dysuria and urgency.   Musculoskeletal:  Negative for arthralgias and back pain.   Skin:  Negative for rash.   Neurological:  Positive for light-headedness. Negative for dizziness, weakness and headaches.        + dizziness with positional changes   Hematological:  Negative for adenopathy.   Psychiatric/Behavioral:  Negative for behavioral problems, confusion and sleep disturbance.    All other systems reviewed and are negative.     Past Medical History:     Past Medical History:   Diagnosis Date    Hypercholesterolemia     Palpitations     Paroxysmal SVT (supraventricular tachycardia)       Past Surgical History:     Past Surgical History:   Procedure Laterality Date    BREAST BIOPSY Right 03/03/2003    stereotactic core bx    DILATION AND CURETTAGE OF UTERUS  2003    ENDOMETRIAL ABLATION  11/2009    OOPHORECTOMY Right 04/2012    Robotic assisted laparoscopic RSO, excision endometriosis, elevated       Social History:     Social History     Socioeconomic History    Marital status: /Civil Union     Spouse name: Not on file    Number of children: Not on file    Years of education: Not on file    Highest education level: Not on file   Occupational History    Not on file   Tobacco Use    Smoking status: Former    Smokeless tobacco: Never    Tobacco comments:     quit in 20s    Vaping Use    Vaping status: Never Used   Substance and Sexual Activity    Alcohol use: Yes    Drug use: No    Sexual activity: Yes     Partners: Male     Birth control/protection: Post-menopausal   Other Topics Concern    Not on file   Social History Narrative    Not on file     Social Determinants of Health     Financial Resource Strain: Not on  file   Food Insecurity: Not on file   Transportation Needs: Not on file   Physical Activity: Not on file   Stress: Not on file   Social Connections: Not on file   Intimate Partner Violence: Not on file   Housing Stability: Not on file      Family History:     Family History   Problem Relation Age of Onset    Cancer Mother     Stroke Mother     Dementia Mother     Breast cancer Mother 64    Hyperlipidemia Mother     Arrhythmia Father     Hypertension Father     Atrial fibrillation Father     Melanoma Sister 48        with mets    No Known Problems Brother     No Known Problems Maternal Grandmother     Cancer Maternal Grandfather     No Known Problems Paternal Grandmother     No Known Problems Paternal Grandfather     No Known Problems Daughter     No Known Problems Daughter     No Known Problems Paternal Aunt       Current Medications:     Current Outpatient Medications   Medication Sig Dispense Refill    Calcium Carb-Cholecalciferol 1000-800 MG-UNIT TABS Take 1 tablet by mouth daily      cetirizine (ZyrTEC) 10 mg tablet Take 10 mg by mouth daily      Cyanocobalamin (VITAMIN B 12 PO) Take by mouth      diltiazem (CARDIZEM CD) 120 mg 24 hr capsule TAKE 1 CAPSULE DAILY 90 capsule 3    hyoscyamine (ANASPAZ,LEVSIN) 0.125 MG tablet Take 1 tablet (0.125 mg total) by mouth every 4 (four) hours as needed for cramping 30 tablet 0    Magnesium 100 MG CAPS Take by mouth      methylPREDNISolone 4 MG tablet therapy pack Use as directed on package (Patient not taking: Reported on 11/15/2023) 21 each 0    Misc Natural Products (OSTEO BI-FLEX/5-LOXIN ADVANCED PO) Take by mouth      Omega-3 Fatty Acids (FISH OIL) 645 MG CAPS Take 1 capsule by mouth 2 (two) times a day       raloxifene (EVISTA) 60 mg tablet Take 1 tablet (60 mg total) by mouth daily (Patient not taking: Reported on 10/27/2023) 30 tablet 3    valACYclovir (VALTREX) 1,000 mg tablet Take 1 tablet by mouth 3 (three) times a day as needed       No current  facility-administered medications for this visit.      Allergies:     No Known Allergies   Physical Exam:     LMP 02/28/2019 (Exact Date)     Physical Exam  Vitals and nursing note reviewed.   Constitutional:       General: She is not in acute distress.     Appearance: Normal appearance. She is not ill-appearing or diaphoretic.   HENT:      Head: Normocephalic and atraumatic.      Right Ear: Tympanic membrane, ear canal and external ear normal.      Left Ear: Tympanic membrane, ear canal and external ear normal.      Nose: Congestion present. No rhinorrhea.      Mouth/Throat:      Mouth: Mucous membranes are moist.      Pharynx: Oropharynx is clear. No posterior oropharyngeal erythema.   Eyes:      General:         Right eye: No discharge.         Left eye: No discharge.      Extraocular Movements: Extraocular movements intact.      Conjunctiva/sclera: Conjunctivae normal.      Pupils: Pupils are equal, round, and reactive to light.   Neck:      Vascular: No carotid bruit.   Cardiovascular:      Rate and Rhythm: Normal rate and regular rhythm.      Pulses: Normal pulses.      Heart sounds: Normal heart sounds. No murmur heard.  Pulmonary:      Effort: Pulmonary effort is normal. No respiratory distress.      Breath sounds: Normal breath sounds. No wheezing or rhonchi.   Abdominal:      General: Abdomen is flat. Bowel sounds are normal. There is no distension.      Palpations: There is no mass.      Tenderness: There is no abdominal tenderness.   Musculoskeletal:         General: No swelling or deformity. Normal range of motion.      Cervical back: Normal range of motion and neck supple. No rigidity.      Right lower leg: No edema.      Left lower leg: No edema.   Lymphadenopathy:      Cervical: No cervical adenopathy.   Skin:     General: Skin is warm and dry.      Capillary Refill: Capillary refill takes less than 2 seconds.      Coloration: Skin is not jaundiced.      Findings: No bruising, erythema or rash.    Neurological:      General: No focal deficit present.      Mental Status: She is alert and oriented to person, place, and time.      Cranial Nerves: No cranial nerve deficit.      Sensory: No sensory deficit.      Gait: Gait normal.      Deep Tendon Reflexes: Reflexes normal.   Psychiatric:         Mood and Affect: Mood normal.         Behavior: Behavior normal.         Thought Content: Thought content normal.         Judgment: Judgment normal.          Kalie Strickland MA  [facility]

## 2024-03-05 DIAGNOSIS — J30.89 PERENNIAL ALLERGIC RHINITIS: ICD-10-CM

## 2024-03-05 DIAGNOSIS — M85.80 OSTEOPENIA, UNSPECIFIED LOCATION: ICD-10-CM

## 2024-03-05 RX ORDER — MONTELUKAST SODIUM 10 MG/1
10 TABLET ORAL
Qty: 90 TABLET | Refills: 1 | Status: SHIPPED | OUTPATIENT
Start: 2024-03-05

## 2024-03-05 NOTE — TELEPHONE ENCOUNTER
Reason for call:   [x] Refill   [] Prior Auth  [x] Other: changing from [facility] to [facility].     Office:   [x] PCP/Provider - Dago - Jf العلي  [] Specialty/Provider -         Pharmacy: [facility]     Does the patient have enough for 3 days?   [x] Yes   [] No - Send as HP to POD

## 2024-03-06 RX ORDER — RALOXIFENE HYDROCHLORIDE 60 MG/1
60 TABLET, FILM COATED ORAL DAILY
Qty: 90 TABLET | Refills: 1 | Status: SHIPPED | OUTPATIENT
Start: 2024-03-06

## 2024-03-19 DIAGNOSIS — R63.8 INCREASED BMI: Primary | ICD-10-CM

## 2024-03-19 RX ORDER — TIRZEPATIDE 2.5 MG/.5ML
2.5 INJECTION, SOLUTION SUBCUTANEOUS WEEKLY
Qty: 2 ML | Refills: 0 | Status: SHIPPED | OUTPATIENT
Start: 2024-03-19 | End: 2024-04-16

## 2024-03-20 ENCOUNTER — TELEPHONE (OUTPATIENT)
Age: 59
End: 2024-03-20

## 2024-03-20 NOTE — TELEPHONE ENCOUNTER
PA for tirzepatide (Zepbound) 2.5 mg/0.5 mL auto-injector    Submitted via    [][employer]-KEY   [x][employer]-Case ID # [id number]  []Faxed to plan   []Other website   []Phone call Case ID #     Office notes sent, clinical questions answered. Awaiting determination    Turnaround time for your insurance to make a decision on your Prior Authorization can take 7-21 business days.

## 2024-03-22 NOTE — TELEPHONE ENCOUNTER
PA for tirzepatide (Zepbound) 2.5 mg/0.5 mL auto-injector Approved     Date(s) approved 2- - 11-          Patient advised by [x] Belanit Message                      [] Phone call       Pharmacy advised by [x]Fax                                     []Phone call    Approval letter scanned into Media no not available at this time

## 2024-03-29 ENCOUNTER — APPOINTMENT (OUTPATIENT)
Dept: LAB | Facility: MEDICAL CENTER | Age: 59
End: 2024-03-29
Payer: COMMERCIAL

## 2024-03-29 DIAGNOSIS — Z00.00 ANNUAL PHYSICAL EXAM: ICD-10-CM

## 2024-03-29 DIAGNOSIS — R63.5 WEIGHT GAIN: ICD-10-CM

## 2024-03-29 DIAGNOSIS — E03.9 HYPOTHYROIDISM, UNSPECIFIED TYPE: Primary | ICD-10-CM

## 2024-03-29 LAB
ALBUMIN SERPL BCP-MCNC: 4.3 G/DL (ref 3.5–5)
ALP SERPL-CCNC: 66 U/L (ref 34–104)
ALT SERPL W P-5'-P-CCNC: 15 U/L (ref 7–52)
ANION GAP SERPL CALCULATED.3IONS-SCNC: 8 MMOL/L (ref 4–13)
AST SERPL W P-5'-P-CCNC: 17 U/L (ref 13–39)
BILIRUB SERPL-MCNC: 0.59 MG/DL (ref 0.2–1)
BUN SERPL-MCNC: 18 MG/DL (ref 5–25)
CALCIUM SERPL-MCNC: 9.2 MG/DL (ref 8.4–10.2)
CHLORIDE SERPL-SCNC: 106 MMOL/L (ref 96–108)
CHOLEST SERPL-MCNC: 249 MG/DL
CO2 SERPL-SCNC: 27 MMOL/L (ref 21–32)
CREAT SERPL-MCNC: 0.92 MG/DL (ref 0.6–1.3)
GFR SERPL CREATININE-BSD FRML MDRD: 69 ML/MIN/1.73SQ M
GLUCOSE P FAST SERPL-MCNC: 83 MG/DL (ref 65–99)
HDLC SERPL-MCNC: 65 MG/DL
LDLC SERPL CALC-MCNC: 166 MG/DL (ref 0–100)
NONHDLC SERPL-MCNC: 184 MG/DL
POTASSIUM SERPL-SCNC: 4.3 MMOL/L (ref 3.5–5.3)
PROT SERPL-MCNC: 6.6 G/DL (ref 6.4–8.4)
SODIUM SERPL-SCNC: 141 MMOL/L (ref 135–147)
T4 FREE SERPL-MCNC: 0.74 NG/DL (ref 0.61–1.12)
TRIGL SERPL-MCNC: 92 MG/DL
TSH SERPL DL<=0.05 MIU/L-ACNC: 5.09 UIU/ML (ref 0.45–4.5)

## 2024-03-29 PROCEDURE — 84443 ASSAY THYROID STIM HORMONE: CPT

## 2024-03-29 PROCEDURE — 84439 ASSAY OF FREE THYROXINE: CPT

## 2024-03-29 RX ORDER — LEVOTHYROXINE SODIUM 0.03 MG/1
25 TABLET ORAL
Qty: 90 TABLET | Refills: 3 | Status: SHIPPED | OUTPATIENT
Start: 2024-03-29

## 2024-04-01 DIAGNOSIS — E03.9 HYPOTHYROIDISM, UNSPECIFIED TYPE: Primary | ICD-10-CM

## 2024-04-04 DIAGNOSIS — R63.8 INCREASED BMI: ICD-10-CM

## 2024-04-04 RX ORDER — TIRZEPATIDE 2.5 MG/.5ML
2.5 INJECTION, SOLUTION SUBCUTANEOUS WEEKLY
Qty: 2 ML | Refills: 0 | Status: SHIPPED | OUTPATIENT
Start: 2024-04-04 | End: 2024-05-02

## 2024-04-04 NOTE — TELEPHONE ENCOUNTER
Reason for call:   [x] Refill   [] Prior Auth  [] Other:     Office:   [x] PCP/Provider -   [] Specialty/Provider -     Medication: tirzepatide (Zepbound) 2.5 mg/0.5 mL auto-injector     Dose/Frequency:  Inject 0.5 mL (2.5 mg total) under the skin once a week for 28 days     Quantity: 2ml    [id number] [place] [facility] [address] [facility] [phone]     Does the patient have enough for 3 days?   [x] Yes   [] No - Send as HP to POD

## 2024-05-01 ENCOUNTER — TELEPHONE (OUTPATIENT)
Age: 59
End: 2024-05-01

## 2024-05-01 DIAGNOSIS — R63.8 INCREASED BMI: Primary | ICD-10-CM

## 2024-05-01 RX ORDER — TIRZEPATIDE 5 MG/.5ML
5 INJECTION, SOLUTION SUBCUTANEOUS WEEKLY
Qty: 2 ML | Refills: 0 | Status: SHIPPED | OUTPATIENT
Start: 2024-05-01

## 2024-05-01 NOTE — TELEPHONE ENCOUNTER
Patient is requesting the next strength, Zepbound 5mg, be sent to [facility]. Please review and send prescription if appropriate.

## 2024-05-16 ENCOUNTER — OFFICE VISIT (OUTPATIENT)
Dept: FAMILY MEDICINE CLINIC | Facility: CLINIC | Age: 59
End: 2024-05-16

## 2024-05-16 VITALS
WEIGHT: 216.8 LBS | HEIGHT: 65 IN | BODY MASS INDEX: 36.12 KG/M2 | SYSTOLIC BLOOD PRESSURE: 108 MMHG | DIASTOLIC BLOOD PRESSURE: 78 MMHG | OXYGEN SATURATION: 98 % | TEMPERATURE: 97.5 F | HEART RATE: 65 BPM

## 2024-05-16 DIAGNOSIS — H10.12 ALLERGIC CONJUNCTIVITIS OF LEFT EYE: ICD-10-CM

## 2024-05-16 DIAGNOSIS — H69.93 EUSTACHIAN TUBE DYSFUNCTION, BILATERAL: Primary | ICD-10-CM

## 2024-05-16 RX ORDER — PREDNISONE 10 MG/1
TABLET ORAL
Qty: 26 TABLET | Refills: 0 | Status: SHIPPED | OUTPATIENT
Start: 2024-05-16

## 2024-05-16 RX ORDER — AZELASTINE HYDROCHLORIDE 0.5 MG/ML
1 SOLUTION/ DROPS OPHTHALMIC 2 TIMES DAILY
Qty: 6 ML | Refills: 3 | Status: SHIPPED | OUTPATIENT
Start: 2024-05-16

## 2024-05-17 DIAGNOSIS — R63.8 INCREASED BMI: ICD-10-CM

## 2024-05-17 RX ORDER — TIRZEPATIDE 5 MG/.5ML
5 INJECTION, SOLUTION SUBCUTANEOUS WEEKLY
Qty: 2 ML | Refills: 0 | Status: SHIPPED | OUTPATIENT
Start: 2024-05-17

## 2024-05-17 NOTE — PROGRESS NOTES
Patient ID: Veronica Herrera is a 58 y.o. female.    HPI: 58 y.o.female presenting with symptoms of ear pressure, crackling of ears and dizziness associated with positional changes.  His left eye is itchy and keeps watering.      SUBJECTIVE    Family History   Problem Relation Age of Onset    Cancer Mother     Stroke Mother     Dementia Mother     Breast cancer Mother 64    Hyperlipidemia Mother     Arrhythmia Father     Hypertension Father     Atrial fibrillation Father     Melanoma Sister 48        with mets    No Known Problems Brother     No Known Problems Maternal Grandmother     Cancer Maternal Grandfather     No Known Problems Paternal Grandmother     No Known Problems Paternal Grandfather     No Known Problems Daughter     No Known Problems Daughter     No Known Problems Paternal Aunt      Social History     Socioeconomic History    Marital status: /Civil Union     Spouse name: Not on file    Number of children: Not on file    Years of education: Not on file    Highest education level: Not on file   Occupational History    Not on file   Tobacco Use    Smoking status: Former     Passive exposure: Never    Smokeless tobacco: Never    Tobacco comments:     quit in 20s    Vaping Use    Vaping status: Never Used   Substance and Sexual Activity    Alcohol use: Yes    Drug use: No    Sexual activity: Yes     Partners: Male     Birth control/protection: Post-menopausal   Other Topics Concern    Not on file   Social History Narrative    Not on file     Social Determinants of Health     Financial Resource Strain: Not on file   Food Insecurity: Not on file   Transportation Needs: Not on file   Physical Activity: Not on file   Stress: Not on file   Social Connections: Not on file   Intimate Partner Violence: Not on file   Housing Stability: Not on file     Past Medical History:   Diagnosis Date    Hypercholesterolemia     Palpitations     Paroxysmal SVT (supraventricular tachycardia)      Past Surgical History:    Procedure Laterality Date    BREAST BIOPSY Right 03/03/2003    stereotactic core bx    DILATION AND CURETTAGE OF UTERUS  2003    ENDOMETRIAL ABLATION  11/2009    OOPHORECTOMY Right 04/2012    Robotic assisted laparoscopic RSO, excision endometriosis, elevated      No Known Allergies    Current Outpatient Medications:     azelastine (OPTIVAR) 0.05 % ophthalmic solution, Administer 1 drop into the left eye 2 (two) times a day, Disp: 6 mL, Rfl: 3    b complex vitamins capsule, Take 1 capsule by mouth daily, Disp: , Rfl:     Calcium Carb-Cholecalciferol 1000-800 MG-UNIT TABS, Take 1 tablet by mouth daily, Disp: , Rfl:     cetirizine (ZyrTEC) 10 mg tablet, Take 10 mg by mouth daily, Disp: , Rfl:     diltiazem (CARDIZEM CD) 120 mg 24 hr capsule, TAKE 1 CAPSULE DAILY, Disp: 90 capsule, Rfl: 3    hyoscyamine (ANASPAZ,LEVSIN) 0.125 MG tablet, Take 1 tablet (0.125 mg total) by mouth every 4 (four) hours as needed for cramping, Disp: 30 tablet, Rfl: 0    levothyroxine (Euthyrox) 25 mcg tablet, Take 1 tablet (25 mcg total) by mouth daily in the early morning, Disp: 90 tablet, Rfl: 3    Magnesium 100 MG CAPS, Take by mouth, Disp: , Rfl:     Misc Natural Products (OSTEO BI-FLEX/5-LOXIN ADVANCED PO), Take by mouth, Disp: , Rfl:     montelukast (SINGULAIR) 10 mg tablet, Take 1 tablet (10 mg total) by mouth daily at bedtime, Disp: 90 tablet, Rfl: 1    Omega-3 Fatty Acids (FISH OIL) 645 MG CAPS, Take 1 capsule by mouth 2 (two) times a day , Disp: , Rfl:     predniSONE 10 mg tablet, 3 tabs po bid x2 days, then 2 tabs po bid x2 days, then 1 tab bid x2 days, then 1 daily until done., Disp: 26 tablet, Rfl: 0    raloxifene (EVISTA) 60 mg tablet, Take 1 tablet (60 mg total) by mouth daily, Disp: 90 tablet, Rfl: 1    Red Yeast Rice Extract (RED YEAST RICE PO), Take by mouth, Disp: , Rfl:     tirzepatide (Zepbound) 5 mg/0.5 mL auto-injector, Inject 0.5 mL (5 mg total) under the skin once a week, Disp: 2 mL, Rfl: 0    valACYclovir  "(VALTREX) 1,000 mg tablet, Take 1 tablet by mouth 3 (three) times a day as needed, Disp: , Rfl:     predniSONE 10 mg tablet, 3 tabs po bid x2 days, then 2 tabs po bid x2 days, then 1 tab bid x2 days, then 1 daily until done. (Patient not taking: Reported on 5/16/2024), Disp: 26 tablet, Rfl: 0    Review of Systems  Constitutional:     Denies fever, chills, fatigue, weakness ,weight loss, weight gain       ENT: Denies earache, loss of hearing, nosebleed, nasal discharge,nasal congestion, sore throat,hoarseness, but complains of ear pressure,and crackling  and left eye pink, itchy and tearing    Pulmonary: Denies shortness of breath ,cough , dyspnea on exertionon, orthopnea , PND   Cardiovascular:  Denies bradycardia , tachycardia ,palpations, lower extremity, edema leg, claudication  Breast:  Denies new or changing breast lumps,  nipple discharge, nipple changes,  Abdomen:  Denies abdominal pain , anorexia ,indigestion, nausea ,vomiting, constipation , diarrhea  Musculoskeletal: Denies myalgias, arthralgias, joint swelling, joint stiffness ,limb pain, limb swelling  Lymph:denies swollen glands  Gu: no dysuria or urinary frequency  Skin: Denies skin rash, skin lesion, skin wound, itching,dry skin  Neuro: Denies headache, numbness, tingling, confusion, loss of consciousness, but complains of postitional vertigo  Psychiatric: Denies feelings of depression, suicidal ideation, anxiety, sleep disturbances    OBJECTIVE  /78   Pulse 65   Temp 97.5 °F (36.4 °C)   Ht 5' 5\" (1.651 m)   Wt 98.3 kg (216 lb 12.8 oz)   LMP 02/28/2019 (Exact Date)   SpO2 98%   BMI 36.08 kg/m²   Constitutional:   NAD, well appearing and well nourished      ENT:   Conjunctiva and lids: no injection, edema, or discharge throat,hoarseness, + left conjunctiveal injection wit slight pink sclera    Pupils and iris: BELEM bilaterally    External inspection of ears and nose: normal without deformities or discharge.      Otoscopic exam: Canals " patent with tm dull, no erythema,but large effusions noted bilaterally  ENasal mucosa, septum and turbinates: Turbinae injection with discharge   Oropharynx:  Moist mucosa, normal tongue and tonsils without lesions.Erythema and injection  of post pharynx with pnd      Pulmonary:Respiratory effort normal rate and rhythm, no increased work of breathing. Auscultation of lungs:  Clear bilaterally with no adventitious breath sounds       Cardiovascular: regular rate and rhythm, S1 and S2, no murmur, no edema and/or varicosities of LE      Abdomen: Soft and non-distended     Positive bowel sounds      No heptomegaly or splenomegaly      Lymphatic: Anterior and posterior cervical lymphadenopathy         Muscskeletal:  Gait and station: Normal gait      Digits and nails normal without clubbing or cyanosis       Inspection/palpation of joints, bones, and muscles:  No joint tenderness, swelling, full active and passive range of motion       Gu: no suprabubic tenderness, CVA tenderness or urethral discharge  Skin: Normal skin turgor and no rashes      Neuro:    Normal reflexes      Psych:   alert and oriented to person, place and time     normal mood and affect       Assessment/Plan:Diagnoses and all orders for this visit:    Eustachian tube dysfunction, bilateral  -     predniSONE 10 mg tablet; 3 tabs po bid x2 days, then 2 tabs po bid x2 days, then 1 tab bid x2 days, then 1 daily until done.    Allergic conjunctivitis of left eye  -     azelastine (OPTIVAR) 0.05 % ophthalmic solution; Administer 1 drop into the left eye 2 (two) times a day        Reviewed with patient plan to treat with above plan.    Patient instructed to call in 72 hours if not feeling better or if symptoms worsen

## 2024-06-07 ENCOUNTER — HOSPITAL ENCOUNTER (OUTPATIENT)
Dept: RADIOLOGY | Facility: MEDICAL CENTER | Age: 59
Discharge: HOME | End: 2024-06-07
Payer: COMMERCIAL

## 2024-06-07 VITALS — BODY MASS INDEX: 34.82 KG/M2 | HEIGHT: 65 IN | WEIGHT: 209 LBS

## 2024-06-07 DIAGNOSIS — Z12.31 ENCOUNTER FOR SCREENING MAMMOGRAM FOR MALIGNANT NEOPLASM OF BREAST: ICD-10-CM

## 2024-06-07 PROCEDURE — 77063 BREAST TOMOSYNTHESIS BI: CPT

## 2024-06-07 PROCEDURE — 77067 SCR MAMMO BI INCL CAD: CPT

## 2024-06-11 DIAGNOSIS — R63.8 INCREASED BMI: Primary | ICD-10-CM

## 2024-06-11 DIAGNOSIS — R63.8 INCREASED BMI: ICD-10-CM

## 2024-06-11 RX ORDER — TIRZEPATIDE 5 MG/.5ML
5 INJECTION, SOLUTION SUBCUTANEOUS WEEKLY
Qty: 2 ML | Refills: 0 | OUTPATIENT
Start: 2024-06-11

## 2024-06-11 RX ORDER — TIRZEPATIDE 7.5 MG/.5ML
7.5 INJECTION, SOLUTION SUBCUTANEOUS WEEKLY
Qty: 2 ML | Refills: 0 | Status: SHIPPED | OUTPATIENT
Start: 2024-06-11

## 2024-06-11 NOTE — TELEPHONE ENCOUNTER
*Pt does NOT want dose increase*  She wants to remain on the same dose for now.    Reason for call:   [x] Refill   [] Prior Auth  [] Other:     Office:   [x] PCP/Provider - [facility]   [] Specialty/Provider -     Medication:   tirzepatide (Zepbound) 5 mg/0.5 mL auto-injector - Inject 0.5 mL (5 mg total) under the skin once a week     Pharmacy:   [id number] - [place] - [address] [place] [address]     Does the patient have enough for 3 days?   [x] Yes   [] No - Send as HP to POD

## 2024-08-14 DIAGNOSIS — J30.89 PERENNIAL ALLERGIC RHINITIS: ICD-10-CM

## 2024-08-14 DIAGNOSIS — M85.80 OSTEOPENIA, UNSPECIFIED LOCATION: ICD-10-CM

## 2024-08-14 RX ORDER — RALOXIFENE HYDROCHLORIDE 60 MG/1
60 TABLET, FILM COATED ORAL DAILY
Qty: 90 TABLET | Refills: 3 | Status: SHIPPED | OUTPATIENT
Start: 2024-08-14

## 2024-08-14 RX ORDER — MONTELUKAST SODIUM 10 MG/1
10 TABLET ORAL
Qty: 100 TABLET | Refills: 1 | Status: SHIPPED | OUTPATIENT
Start: 2024-08-14

## 2024-08-19 DIAGNOSIS — R63.8 INCREASED BMI: ICD-10-CM

## 2024-08-19 NOTE — TELEPHONE ENCOUNTER
Reason for call:   [x] Refill   [] Prior Auth  [] Other:     Office:   [x] PCP/Provider -   [] Specialty/Provider -     Medication: ZEPBOUND    Dose/Frequency: 10 MG    Quantity:     [id number] [place] - [address] [address]  Phone: [phone]  Fax: [phone]     Does the patient have enough for 3 days?   [x] Yes   [] No - Send as HP to POD    How are you tolerating the medication?   [] Nausea  [] Vomiting  [] Diarrhea  [x] Asymptomatic     Would you like an increase in your dose?  [] Yes   [x] No

## 2024-08-20 DIAGNOSIS — R63.8 INCREASED BMI: Primary | ICD-10-CM

## 2024-08-20 RX ORDER — TIRZEPATIDE 12.5 MG/.5ML
12.5 INJECTION, SOLUTION SUBCUTANEOUS WEEKLY
Qty: 2 ML | Refills: 0 | Status: SHIPPED | OUTPATIENT
Start: 2024-08-20

## 2024-08-20 RX ORDER — TIRZEPATIDE 10 MG/.5ML
10 INJECTION, SOLUTION SUBCUTANEOUS WEEKLY
Qty: 2 ML | Refills: 0 | OUTPATIENT
Start: 2024-08-20

## 2024-08-22 ENCOUNTER — OFFICE VISIT (OUTPATIENT)
Dept: FAMILY MEDICINE CLINIC | Facility: CLINIC | Age: 59
End: 2024-08-22
Payer: COMMERCIAL

## 2024-08-22 VITALS
WEIGHT: 194.5 LBS | BODY MASS INDEX: 32.4 KG/M2 | HEART RATE: 64 BPM | OXYGEN SATURATION: 99 % | TEMPERATURE: 97.1 F | SYSTOLIC BLOOD PRESSURE: 118 MMHG | DIASTOLIC BLOOD PRESSURE: 78 MMHG | HEIGHT: 65 IN

## 2024-08-22 DIAGNOSIS — R10.31 RLQ ABDOMINAL PAIN: Primary | ICD-10-CM

## 2024-08-22 PROCEDURE — 99214 OFFICE O/P EST MOD 30 MIN: CPT | Performed by: NURSE PRACTITIONER

## 2024-08-22 NOTE — PROGRESS NOTES
Ambulatory Visit  Name: Veronica Herrera      : 1965      MRN: [id number]  Encounter Provider: CASH Carpio  Encounter Date: 2024   Encounter department: [facility]    Assessment & Plan   1. RLQ abdominal pain  -     US abdomen complete; Future; Expected date: 2024    Discussed with patient plan to obtain an abdominal ultrasound to further evaluate  Patient instructed to call if no improvement in 72 hours or symptoms worsen       History of Present Illness     58 y.o.female presenting with a lump in her right groin for the past 8 months. She reports that she noticed a small lump back in 2024 while shower but a few weeks ago she noticed the lump seem to have increased in size. She usually feels it when standing up or laying down with head flat.        Review of Systems   Constitutional: Negative.    Respiratory: Negative.     Cardiovascular: Negative.    Gastrointestinal:  Positive for abdominal pain (right groin lump). Negative for abdominal distention, constipation and diarrhea.   Genitourinary: Negative.    Musculoskeletal: Negative.    Neurological: Negative.    Hematological: Negative.    Psychiatric/Behavioral: Negative.       Past Medical History:   Diagnosis Date   • Hypercholesterolemia    • Palpitations    • Paroxysmal SVT (supraventricular tachycardia)      Past Surgical History:   Procedure Laterality Date   • BREAST BIOPSY Right 2003    stereotactic core bx   • DILATION AND CURETTAGE OF UTERUS     • ENDOMETRIAL ABLATION  2009   • OOPHORECTOMY Right 2012    Robotic assisted laparoscopic RSO, excision endometriosis, elevated      Family History   Problem Relation Age of Onset   • Cancer Mother    • Stroke Mother    • Dementia Mother    • Breast cancer Mother 64   • Hyperlipidemia Mother    • Arrhythmia Father    • Hypertension Father    • Atrial fibrillation Father    • Melanoma Sister 48        with mets   • No Known  Problems Brother    • No Known Problems Maternal Grandmother    • Cancer Maternal Grandfather    • No Known Problems Paternal Grandmother    • No Known Problems Paternal Grandfather    • No Known Problems Daughter    • No Known Problems Daughter    • No Known Problems Paternal Aunt      Social History     Tobacco Use   • Smoking status: Former     Passive exposure: Never   • Smokeless tobacco: Never   • Tobacco comments:     quit in 20s    Vaping Use   • Vaping status: Never Used   Substance and Sexual Activity   • Alcohol use: Yes   • Drug use: No   • Sexual activity: Yes     Partners: Male     Birth control/protection: Post-menopausal     Current Outpatient Medications on File Prior to Visit   Medication Sig   • Calcium Carb-Cholecalciferol 1000-800 MG-UNIT TABS Take 1 tablet by mouth daily   • cetirizine (ZyrTEC) 10 mg tablet Take 10 mg by mouth daily   • diltiazem (CARDIZEM CD) 120 mg 24 hr capsule TAKE 1 CAPSULE DAILY   • hyoscyamine (ANASPAZ,LEVSIN) 0.125 MG tablet Take 1 tablet (0.125 mg total) by mouth every 4 (four) hours as needed for cramping   • levothyroxine (Euthyrox) 25 mcg tablet Take 1 tablet (25 mcg total) by mouth daily in the early morning   • Magnesium 100 MG CAPS Take by mouth   • Misc Natural Products (OSTEO BI-FLEX/5-LOXIN ADVANCED PO) Take by mouth   • montelukast (SINGULAIR) 10 mg tablet TAKE 1 TABLET DAILY AT BEDTIME   • Omega-3 Fatty Acids (FISH OIL) 645 MG CAPS Take 1 capsule by mouth 2 (two) times a day    • raloxifene (EVISTA) 60 mg tablet TAKE 1 TABLET DAILY   • Red Yeast Rice Extract (RED YEAST RICE PO) Take by mouth   • tirzepatide (Zepbound) 10 mg/0.5 mL auto-injector Inject 0.5 mL (10 mg total) under the skin once a week   • tirzepatide (Zepbound) 12.5 mg/0.5 mL auto-injector Inject 0.5 mL (12.5 mg total) under the skin once a week (Patient not taking: Reported on 8/22/2024)   • valACYclovir (VALTREX) 1,000 mg tablet Take 1 tablet by mouth 3 (three) times a day as needed   •  "[DISCONTINUED] azelastine (OPTIVAR) 0.05 % ophthalmic solution Administer 1 drop into the left eye 2 (two) times a day   • [DISCONTINUED] b complex vitamins capsule Take 1 capsule by mouth daily   • [DISCONTINUED] predniSONE 10 mg tablet 3 tabs po bid x2 days, then 2 tabs po bid x2 days, then 1 tab bid x2 days, then 1 daily until done. (Patient not taking: Reported on 5/16/2024)   • [DISCONTINUED] predniSONE 10 mg tablet 3 tabs po bid x2 days, then 2 tabs po bid x2 days, then 1 tab bid x2 days, then 1 daily until done.     No Known Allergies  Immunization History   Administered Date(s) Administered   • COVID-19 PFIZER VACCINE 0.3 ML IM 03/24/2021, 04/14/2021, 12/29/2021   • INFLUENZA 11/12/2022   • Tdap 10/12/2020   • Zoster Vaccine Recombinant 01/17/2023, 07/20/2023     Objective     /78 (BP Location: Right arm, Patient Position: Sitting, Cuff Size: Large)   Pulse 64   Temp (!) 97.1 °F (36.2 °C)   Ht 5' 5\" (1.651 m)   Wt 88.2 kg (194 lb 8 oz)   LMP 02/28/2019 (Exact Date)   SpO2 99%   BMI 32.37 kg/m² (Reviewed)    Physical Exam  Vitals reviewed.   Constitutional:       General: She is not in acute distress.     Appearance: She is not ill-appearing.   HENT:      Head: Normocephalic and atraumatic.      Right Ear: External ear normal.      Left Ear: External ear normal.   Eyes:      Extraocular Movements: Extraocular movements intact.      Conjunctiva/sclera: Conjunctivae normal.      Pupils: Pupils are equal, round, and reactive to light.   Cardiovascular:      Rate and Rhythm: Normal rate and regular rhythm.   Pulmonary:      Effort: Pulmonary effort is normal.   Abdominal:      General: Abdomen is flat. Bowel sounds are normal. There is no distension.      Palpations: Abdomen is soft.      Tenderness: There is abdominal tenderness in the right lower quadrant.      Hernia: No hernia is present.   Skin:     General: Skin is warm and dry.   Neurological:      Mental Status: She is alert and oriented to " person, place, and time.   Psychiatric:         Mood and Affect: Mood normal.         Behavior: Behavior normal.

## 2024-09-03 ENCOUNTER — HOSPITAL ENCOUNTER (OUTPATIENT)
Dept: ULTRASOUND IMAGING | Facility: HOSPITAL | Age: 59
Discharge: HOME | End: 2024-09-03
Payer: COMMERCIAL

## 2024-09-03 DIAGNOSIS — R10.31 RLQ ABDOMINAL PAIN: ICD-10-CM

## 2024-09-03 PROCEDURE — 76705 ECHO EXAM OF ABDOMEN: CPT

## 2024-09-24 DIAGNOSIS — R63.8 INCREASED BMI: ICD-10-CM

## 2024-09-24 NOTE — TELEPHONE ENCOUNTER
Reason for call:   [x] Refill   [] Prior Auth  [] Other:     Office:   [x] PCP/Provider - Julia Loza  [] Specialty/Provider -     Medication: Zepbound    Dose/Frequency: 12.5 mg Weekly     Quantity: 2 ml    Pharmacy: [facility] [facility]    Does the patient have enough for 3 days?   [x] Yes   [] No - Send as HP to POD

## 2024-09-25 DIAGNOSIS — R63.8 INCREASED BMI: Primary | ICD-10-CM

## 2024-09-25 RX ORDER — TIRZEPATIDE 12.5 MG/.5ML
12.5 INJECTION, SOLUTION SUBCUTANEOUS WEEKLY
Qty: 2 ML | Refills: 0 | OUTPATIENT
Start: 2024-09-25

## 2024-09-25 RX ORDER — TIRZEPATIDE 15 MG/.5ML
15 INJECTION, SOLUTION SUBCUTANEOUS WEEKLY
Qty: 2 ML | Refills: 0 | Status: SHIPPED | OUTPATIENT
Start: 2024-09-25

## 2024-10-31 ENCOUNTER — TELEPHONE (OUTPATIENT)
Age: 59
End: 2024-10-31

## 2024-10-31 DIAGNOSIS — R63.8 INCREASED BMI: ICD-10-CM

## 2024-10-31 RX ORDER — TIRZEPATIDE 15 MG/.5ML
15 INJECTION, SOLUTION SUBCUTANEOUS WEEKLY
Qty: 6 ML | Refills: 1 | Status: SHIPPED | OUTPATIENT
Start: 2024-10-31

## 2024-10-31 NOTE — TELEPHONE ENCOUNTER
[place] Zepbound 15 mg/0.5 mL APPROVED     Date(s) approved October 1, 2024 to October 31, 2025     Case # [id number]     Patient advised by          [x][employer] (STIE)hart Message  []Phone call   [x]LMOM - unverified VM that did not say pt's name  []L/M to call office as no active Communication consent on file  []Unable to leave detailed message as VM not approved on Communication consent       Pharmacy advised by    [x]Fax  []Phone call

## 2024-10-31 NOTE — TELEPHONE ENCOUNTER
[place] Zepbound 15 mg/0.5 mL SUBMITTED     via    []CMM-KEY:    [x]Surescripts-Case ID # [id number]   []Availity-Auth ID #  NDC #    []Faxed to plan   []Other website    []Phone call Case ID #      Office notes sent, clinical questions answered. Awaiting determination    Turnaround time for your insurance to make a decision on your Prior Authorization can take 7-21 business days.

## 2024-10-31 NOTE — TELEPHONE ENCOUNTER
Reason for call:   [x] Refill   [] Prior Auth  [] Other:     Office:   [x] PCP/Provider -  [facility]  Authorized By: Julia Loza DO  [] Specialty/Provider -     Medication:   tirzepatide (Zepbound) 15 mg/0.5 mL auto-injector 15 mg, Weekly         Pharmacy: [id number] [place] - [address] [facility]      Does the patient have enough for 3 days?   [] Yes   [x] No - Send as HP to POD

## 2024-11-06 ENCOUNTER — TELEPHONE (OUTPATIENT)
Dept: CARDIOLOGY CLINIC | Facility: CLINIC | Age: 59
End: 2024-11-06

## 2024-11-06 ENCOUNTER — PREP FOR PROCEDURE (OUTPATIENT)
Dept: SURGERY | Facility: CLINIC | Age: 59
End: 2024-11-06

## 2024-11-06 ENCOUNTER — OFFICE VISIT (OUTPATIENT)
Dept: SURGERY | Facility: CLINIC | Age: 59
End: 2024-11-06
Payer: COMMERCIAL

## 2024-11-06 VITALS
OXYGEN SATURATION: 99 % | DIASTOLIC BLOOD PRESSURE: 70 MMHG | HEIGHT: 65 IN | WEIGHT: 182 LBS | BODY MASS INDEX: 30.32 KG/M2 | SYSTOLIC BLOOD PRESSURE: 114 MMHG | HEART RATE: 63 BPM

## 2024-11-06 DIAGNOSIS — K40.20 BILATERAL INGUINAL HERNIA: Primary | ICD-10-CM

## 2024-11-06 PROCEDURE — 99204 OFFICE O/P NEW MOD 45 MIN: CPT | Performed by: SURGERY

## 2024-11-06 NOTE — PROGRESS NOTES
Ambulatory Visit  Name: Veronica Herrera      : 1965      MRN: [id number]  Encounter Provider: Eduardo Reyes DO  Encounter Date: 2024   Encounter department: [facility]    Assessment & Plan  Bilateral inguinal hernia  Discussed risks and benefits of a robotic assisted laparoscopic bilateral inguinal hernia repair including the potential for recurrence, bowel injury, pain issues or even open surgery and she agrees to proceed\  CBC, BMP, EKG preoperatively.    Will request cardiac clearance from Dr. Trinh, her cardiologist         History of Present Illness     Veronica Herrera is a 59 y.o. female who presents for ventral hernia consult.  States she has had bulges on her right lower abdomen and left lower abdomen for 11 months.  The bulge on the right side has increased in size.  She has intermittent achy pain.  Limits her activities.  Ultrasound abdominal wall 9/3/2024  FINDINGS:  6.8 x 4.4 x 2.7 cm (length X depth X width) right lower abdominal hernia containing fat and bowel loops. Neck of hernia measures approximately 0.8 cm, may be undermeasured. Hernia increases with standing.  Normal bowel peristalsis. No bowel dilation.  IMPRESSION:  Right lower abdominal hernia.    Had noticed a bulge in each lower groin over the last year.  Right side seems to be more prominent.  Ultrasound was noted as above.  Both lump seem to disappear when laying flat.  She did have a robotic assisted right oophorectomy approximately 12 years ago.  Had subsequent episodes of diverticulitis although this seemed to be a few months after oophorectomy.  She was diagnosed with PACs and follows with cardiology.  This has been quite stable for years.  She is not on anticoagulation.       History obtained from : patient  Review of Systems   Gastrointestinal:  Positive for abdominal pain.   All other systems reviewed and are negative.    Medical History Reviewed by provider this encounter:      "      Objective     /70   Pulse 63   Ht 5' 5\" (1.651 m)   Wt 82.6 kg (182 lb)   LMP 02/28/2019 (Exact Date)   SpO2 99%   BMI 30.29 kg/m²     Physical Exam  Constitutional:       General: She is not in acute distress.  HENT:      Head: Atraumatic.      Mouth/Throat:      Mouth: Mucous membranes are moist.   Eyes:      Extraocular Movements: Extraocular movements intact.   Cardiovascular:      Rate and Rhythm: Normal rate.   Pulmonary:      Effort: Pulmonary effort is normal.   Abdominal:      General: Abdomen is flat. Bowel sounds are normal.      Palpations: Abdomen is soft.      Hernia: A hernia (Examined in a standing position.  Bilateral inguinal hernias noted on standing examination.  Well-healed laparoscopic scars of her mid abdomen.) is present.   Musculoskeletal:      Cervical back: Normal range of motion.   Skin:     General: Skin is warm and dry.   Neurological:      Mental Status: She is alert.         "

## 2024-11-06 NOTE — TELEPHONE ENCOUNTER
Please call pt and give appt before 12/27/27. She needs clearance for robotic lap B/L inguinal hernia repair.  She is Dr. Trinh's pt.    Thank you

## 2024-11-06 NOTE — LETTER
2024     Maria Trinh DO  [address] [facility] [address]    Patient: Veronica Herrera   YOB: 1965   Date of Visit: 2024       Dear Dr. Trinh:    Thank you for referring Veronica Herrera to me for evaluation. Below are my notes for this consultation.    If you have questions, please do not hesitate to call me. I look forward to following your patient along with you.         Sincerely,        Eduardo Reyes DO        CC: No Recipients    Eduardo Reyes DO  2024 10:15 AM  Sign when Signing Visit  Ambulatory Visit  Name: Veronica Herrera      : 1965      MRN: [id number]  Encounter Provider: Eduardo Reyes DO  Encounter Date: 2024   Encounter department: [facility]    Assessment & Plan  Bilateral inguinal hernia  Discussed risks and benefits of a robotic assisted laparoscopic bilateral inguinal hernia repair including the potential for recurrence, bowel injury, pain issues or even open surgery and she agrees to proceed\  CBC, BMP, EKG preoperatively.    Will request cardiac clearance from Dr. Trinh, her cardiologist         History of Present Illness    Veronica Herrera is a 59 y.o. female who presents for ventral hernia consult.  States she has had bulges on her right lower abdomen and left lower abdomen for 11 months.  The bulge on the right side has increased in size.  She has intermittent achy pain.  Limits her activities.  Ultrasound abdominal wall 9/3/2024  FINDINGS:  6.8 x 4.4 x 2.7 cm (length X depth X width) right lower abdominal hernia containing fat and bowel loops. Neck of hernia measures approximately 0.8 cm, may be undermeasured. Hernia increases with standing.  Normal bowel peristalsis. No bowel dilation.  IMPRESSION:  Right lower abdominal hernia.    Had noticed a bulge in each lower groin over the last year.  Right side seems to be more prominent.  Ultrasound was noted as above.  Both lump seem  "to disappear when laying flat.  She did have a robotic assisted right oophorectomy approximately 12 years ago.  Had subsequent episodes of diverticulitis although this seemed to be a few months after oophorectomy.  She was diagnosed with PACs and follows with cardiology.  This has been quite stable for years.  She is not on anticoagulation.       History obtained from : patient  Review of Systems   Gastrointestinal:  Positive for abdominal pain.   All other systems reviewed and are negative.    Medical History Reviewed by provider this encounter:           Objective    /70   Pulse 63   Ht 5' 5\" (1.651 m)   Wt 82.6 kg (182 lb)   LMP 02/28/2019 (Exact Date)   SpO2 99%   BMI 30.29 kg/m²     Physical Exam  Constitutional:       General: She is not in acute distress.  HENT:      Head: Atraumatic.      Mouth/Throat:      Mouth: Mucous membranes are moist.   Eyes:      Extraocular Movements: Extraocular movements intact.   Cardiovascular:      Rate and Rhythm: Normal rate.   Pulmonary:      Effort: Pulmonary effort is normal.   Abdominal:      General: Abdomen is flat. Bowel sounds are normal.      Palpations: Abdomen is soft.      Hernia: A hernia (Examined in a standing position.  Bilateral inguinal hernias noted on standing examination.  Well-healed laparoscopic scars of her mid abdomen.) is present.   Musculoskeletal:      Cervical back: Normal range of motion.   Skin:     General: Skin is warm and dry.   Neurological:      Mental Status: She is alert.         "

## 2024-11-06 NOTE — ASSESSMENT & PLAN NOTE
Discussed risks and benefits of a robotic assisted laparoscopic bilateral inguinal hernia repair including the potential for recurrence, bowel injury, pain issues or even open surgery and she agrees to proceed\  CBC, BMP, EKG preoperatively.    Will request cardiac clearance from Dr. Trinh, her cardiologist

## 2024-11-08 ENCOUNTER — OFFICE VISIT (OUTPATIENT)
Dept: FAMILY MEDICINE CLINIC | Facility: CLINIC | Age: 59
End: 2024-11-08
Payer: COMMERCIAL

## 2024-11-08 VITALS
WEIGHT: 184.5 LBS | OXYGEN SATURATION: 100 % | HEART RATE: 71 BPM | DIASTOLIC BLOOD PRESSURE: 80 MMHG | TEMPERATURE: 97.5 F | HEIGHT: 65 IN | SYSTOLIC BLOOD PRESSURE: 104 MMHG | BODY MASS INDEX: 30.74 KG/M2

## 2024-11-08 DIAGNOSIS — H01.00B BLEPHARITIS OF BOTH UPPER AND LOWER EYELID OF LEFT EYE, UNSPECIFIED TYPE: Primary | ICD-10-CM

## 2024-11-08 PROCEDURE — 99213 OFFICE O/P EST LOW 20 MIN: CPT | Performed by: FAMILY MEDICINE

## 2024-11-08 RX ORDER — NEOMYCIN SULFATE, POLYMYXIN B SULFATE AND BACITRACIN ZINC 3.5; 10000; 4 MG/G; [USP'U]/G; [USP'U]/G
0.5 OINTMENT OPHTHALMIC 3 TIMES DAILY
Qty: 5.3 G | Refills: 0 | Status: SHIPPED | OUTPATIENT
Start: 2024-11-08 | End: 2024-11-15

## 2024-11-08 NOTE — PROGRESS NOTES
"Ambulatory Visit  Name: Veronica Herrera      : 1965      MRN: [id number]  Encounter Provider: Renée Koroma MD  Encounter Date: 2024   Encounter department: [facility]    Assessment & Plan  Blepharitis of both upper and lower eyelid of left eye, unspecified type  Counseled the patient regarding supportive care.  They are to call or return to the office if not improving.   Orders:    neomycin-bacitracin-polymyxin (NEOSPORIN) ophthalmic ointment; Administer 0.5 inches into the left eye 3 (three) times a day for 7 days       History of Present Illness     HPI Patient presents for left eye irritation. Started a month ago with eyelid irritation (she does have a history of eczema). No pain in the eye, vision disturbances.     History obtained from : patient  Review of Systems   Constitutional:  Negative for chills and fever.   HENT:  Negative for congestion and sore throat.    Eyes:  Positive for discharge and itching. Negative for pain and visual disturbance.   Respiratory:  Negative for cough and shortness of breath.    Cardiovascular:  Negative for chest pain and palpitations.   Gastrointestinal:  Negative for abdominal pain and nausea.   Genitourinary:  Negative for dysuria.   Musculoskeletal:  Negative for arthralgias and myalgias.   Skin:  Negative for rash and wound.   Neurological:  Negative for dizziness and headaches.   All other systems reviewed and are negative.    Medical History Reviewed by provider this encounter:           Objective     /80   Pulse 71   Temp 97.5 °F (36.4 °C)   Ht 5' 5\" (1.651 m)   Wt 83.7 kg (184 lb 8 oz)   LMP 2019 (Exact Date)   SpO2 100%   BMI 30.70 kg/m²     Physical Exam  Vitals and nursing note reviewed.   Constitutional:       General: She is not in acute distress.     Appearance: She is well-developed.   HENT:      Head: Normocephalic and atraumatic.      Right Ear: External ear normal.      Left Ear: External " ear normal.      Nose: Nose normal.   Eyes:      General: Lids are normal.         Right eye: No foreign body or discharge.         Left eye: No foreign body or discharge.      Extraocular Movements: Extraocular movements intact.      Conjunctiva/sclera: Conjunctivae normal.      Comments: Blepharitis, left    Neck:      Trachea: No tracheal deviation.   Pulmonary:      Effort: Pulmonary effort is normal.   Abdominal:      Tenderness: There is no abdominal tenderness.   Lymphadenopathy:      Cervical: No cervical adenopathy.   Skin:     General: Skin is warm and dry.      Capillary Refill: Capillary refill takes less than 2 seconds.      Findings: No rash.   Neurological:      Mental Status: She is alert.      Cranial Nerves: No cranial nerve deficit.

## 2024-11-14 ENCOUNTER — OFFICE VISIT (OUTPATIENT)
Dept: CARDIOLOGY CLINIC | Facility: CLINIC | Age: 59
End: 2024-11-14
Payer: COMMERCIAL

## 2024-11-14 VITALS
SYSTOLIC BLOOD PRESSURE: 110 MMHG | OXYGEN SATURATION: 99 % | WEIGHT: 185 LBS | BODY MASS INDEX: 30.82 KG/M2 | DIASTOLIC BLOOD PRESSURE: 62 MMHG | HEIGHT: 65 IN | HEART RATE: 72 BPM

## 2024-11-14 DIAGNOSIS — I47.10 PSVT (PAROXYSMAL SUPRAVENTRICULAR TACHYCARDIA) (HCC): Primary | ICD-10-CM

## 2024-11-14 DIAGNOSIS — E78.5 DYSLIPIDEMIA: ICD-10-CM

## 2024-11-14 DIAGNOSIS — Z01.810 PREOP CARDIOVASCULAR EXAM: ICD-10-CM

## 2024-11-14 PROCEDURE — 99214 OFFICE O/P EST MOD 30 MIN: CPT | Performed by: INTERNAL MEDICINE

## 2024-11-14 PROCEDURE — 93000 ELECTROCARDIOGRAM COMPLETE: CPT | Performed by: INTERNAL MEDICINE

## 2024-11-14 RX ORDER — ROSUVASTATIN CALCIUM 5 MG/1
5 TABLET, COATED ORAL DAILY
Qty: 90 TABLET | Refills: 3 | Status: SHIPPED | OUTPATIENT
Start: 2024-11-14

## 2024-11-14 RX ORDER — DILTIAZEM HYDROCHLORIDE 180 MG/1
180 CAPSULE, COATED, EXTENDED RELEASE ORAL DAILY
Qty: 90 CAPSULE | Refills: 3 | Status: SHIPPED | OUTPATIENT
Start: 2024-11-14

## 2024-11-14 NOTE — PATIENT INSTRUCTIONS
Recommendations:  Start rosuvastatin 5mg daily.  Increase Diltiazem CD to 180mg daily.  Continue remainder of medications.  Check fasting lipid panel and complete metabolic profile in 6 months.  Proceed with surgery.   Follow up in 6 months.

## 2024-11-14 NOTE — PROGRESS NOTES
Cardiology   Veronica Herrera 59 y.o. female MRN: [id number]        Impression:  SVT - occasional palpitations.   Dyslipidemia - .   Hernia surgery - patient at acceptable cardiovascular risk to proceed with surgery.      Recommendations:  Start rosuvastatin 5mg daily.  Increase Diltiazem CD to 180mg daily.  Continue remainder of medications.  Check fasting lipid panel and complete metabolic profile in 6 months.  Proceed with surgery.   Follow up in 6 months.       HPI: Veronica Herrera is a 59 y.o. year old female with SVT, Dyslipidemia who returns for cardiac clearance prior to hernia surgery. Has palpitations several times a week lasting several minutes. No chest pain, shortness of breath.        Review of Systems   Constitutional: Negative.    HENT: Negative.     Eyes: Negative.    Respiratory:  Negative for chest tightness and shortness of breath.    Cardiovascular:  Positive for palpitations. Negative for chest pain and leg swelling.   Gastrointestinal: Negative.    Endocrine: Negative.    Genitourinary: Negative.    Musculoskeletal: Negative.    Skin: Negative.    Allergic/Immunologic: Negative.    Neurological: Negative.    Hematological: Negative.    Psychiatric/Behavioral: Negative.     All other systems reviewed and are negative.        Past Medical History:   Diagnosis Date    Abnormal ECG     Colon polyp     Last colonoscopy    Hypercholesterolemia     Palpitations     Paroxysmal SVT (supraventricular tachycardia) (HCC)      Past Surgical History:   Procedure Laterality Date    BREAST BIOPSY Right 03/03/2003    stereotactic core bx    COLONOSCOPY      DILATION AND CURETTAGE OF UTERUS  2003    ENDOMETRIAL ABLATION  11/2009    OOPHORECTOMY Right 04/2012    Robotic assisted laparoscopic RSO, excision endometriosis, elevated      Social History     Substance and Sexual Activity   Alcohol Use Yes     Social History     Substance and Sexual Activity   Drug Use No     Social History     Tobacco  Use   Smoking Status Former    Passive exposure: Never   Smokeless Tobacco Never   Tobacco Comments    quit in 20s      Family History   Problem Relation Age of Onset    Cancer Mother         Non hodgkins lymphoma    Stroke Mother     Dementia Mother     Breast cancer Mother 64    Hyperlipidemia Mother     Arrhythmia Father     Hypertension Father     Atrial fibrillation Father     Melanoma Sister 48        with mets    No Known Problems Brother     No Known Problems Maternal Grandmother     Cancer Maternal Grandfather     No Known Problems Paternal Grandmother     No Known Problems Paternal Grandfather     No Known Problems Daughter     No Known Problems Daughter     No Known Problems Paternal Aunt        Allergies:  No Known Allergies    Medications:     Current Outpatient Medications:     Calcium Carb-Cholecalciferol 1000-800 MG-UNIT TABS, Take 1 tablet by mouth daily, Disp: , Rfl:     cetirizine (ZyrTEC) 10 mg tablet, Take 10 mg by mouth daily, Disp: , Rfl:     diltiazem (CARDIZEM CD) 120 mg 24 hr capsule, TAKE 1 CAPSULE DAILY, Disp: 90 capsule, Rfl: 3    hyoscyamine (ANASPAZ,LEVSIN) 0.125 MG tablet, Take 1 tablet (0.125 mg total) by mouth every 4 (four) hours as needed for cramping, Disp: 30 tablet, Rfl: 0    levothyroxine (Euthyrox) 25 mcg tablet, Take 1 tablet (25 mcg total) by mouth daily in the early morning, Disp: 90 tablet, Rfl: 3    Magnesium 100 MG CAPS, Take by mouth, Disp: , Rfl:     Misc Natural Products (OSTEO BI-FLEX/5-LOXIN ADVANCED PO), Take by mouth, Disp: , Rfl:     montelukast (SINGULAIR) 10 mg tablet, TAKE 1 TABLET DAILY AT BEDTIME, Disp: 100 tablet, Rfl: 1    neomycin-bacitracin-polymyxin (NEOSPORIN) ophthalmic ointment, Administer 0.5 inches into the left eye 3 (three) times a day for 7 days, Disp: 5.3 g, Rfl: 0    Omega-3 Fatty Acids (FISH OIL) 645 MG CAPS, Take 1 capsule by mouth 2 (two) times a day , Disp: , Rfl:     raloxifene (EVISTA) 60 mg tablet, TAKE 1 TABLET DAILY, Disp: 90 tablet,  Rfl: 3    tirzepatide (Zepbound) 12.5 mg/0.5 mL auto-injector, Inject 0.5 mL (12.5 mg total) under the skin once a week (Patient not taking: Reported on 8/22/2024), Disp: 2 mL, Rfl: 0    valACYclovir (VALTREX) 1,000 mg tablet, Take 1 tablet by mouth 3 (three) times a day as needed, Disp: , Rfl:     Red Yeast Rice Extract (RED YEAST RICE PO), Take by mouth (Patient not taking: Reported on 11/8/2024), Disp: , Rfl:     tirzepatide (Zepbound) 10 mg/0.5 mL auto-injector, Inject 0.5 mL (10 mg total) under the skin once a week (Patient not taking: Reported on 11/8/2024), Disp: 2 mL, Rfl: 0    tirzepatide (Zepbound) 15 mg/0.5 mL auto-injector, Inject 0.5 mL (15 mg total) under the skin once a week (Patient not taking: Reported on 11/8/2024), Disp: 6 mL, Rfl: 1      Wt Readings from Last 3 Encounters:   11/14/24 83.9 kg (185 lb)   11/08/24 83.7 kg (184 lb 8 oz)   11/06/24 82.6 kg (182 lb)     Temp Readings from Last 3 Encounters:   11/08/24 97.5 °F (36.4 °C)   08/22/24 (!) 97.1 °F (36.2 °C)   05/16/24 97.5 °F (36.4 °C)     BP Readings from Last 3 Encounters:   11/14/24 110/62   11/08/24 104/80   11/06/24 114/70     Pulse Readings from Last 3 Encounters:   11/14/24 72   11/08/24 71   11/06/24 63         Physical Exam  HENT:      Head: Atraumatic.      Mouth/Throat:      Mouth: Mucous membranes are moist.   Eyes:      Extraocular Movements: Extraocular movements intact.   Cardiovascular:      Rate and Rhythm: Normal rate and regular rhythm.      Heart sounds: Normal heart sounds.   Pulmonary:      Effort: Pulmonary effort is normal.      Breath sounds: Normal breath sounds.   Abdominal:      General: Abdomen is flat.   Musculoskeletal:         General: Normal range of motion.      Cervical back: Normal range of motion.   Skin:     General: Skin is warm.   Neurological:      General: No focal deficit present.      Mental Status: She is alert and oriented to person, place, and time.   Psychiatric:         Mood and Affect: Mood  "normal.         Behavior: Behavior normal.           Laboratory Studies:  CMP:  Lab Results   Component Value Date    K 4.3 03/29/2024     03/29/2024    CO2 27 03/29/2024    BUN 18 03/29/2024    CREATININE 0.92 03/29/2024    AST 17 03/29/2024    ALT 15 03/29/2024    EGFR 69 03/29/2024       Lipid Profile:   No results found for: \"CHOL\"  Lab Results   Component Value Date    HDL 65 03/29/2024     Lab Results   Component Value Date    LDLCALC 166 (H) 03/29/2024     Lab Results   Component Value Date    TRIG 92 03/29/2024       Cardiac testing:   EKG reviewed personally: NSR 53 1st AV block          "

## 2024-12-17 ENCOUNTER — RESULTS FOLLOW-UP (OUTPATIENT)
Dept: FAMILY MEDICINE CLINIC | Facility: CLINIC | Age: 59
End: 2024-12-17

## 2024-12-17 ENCOUNTER — APPOINTMENT (OUTPATIENT)
Dept: LAB | Facility: CLINIC | Age: 59
End: 2024-12-17
Payer: COMMERCIAL

## 2024-12-17 DIAGNOSIS — E78.5 DYSLIPIDEMIA: ICD-10-CM

## 2024-12-17 DIAGNOSIS — E03.9 HYPOTHYROIDISM, UNSPECIFIED TYPE: ICD-10-CM

## 2024-12-17 DIAGNOSIS — K40.20 BILATERAL INGUINAL HERNIA: ICD-10-CM

## 2024-12-17 LAB
ANION GAP SERPL CALCULATED.3IONS-SCNC: 7 MMOL/L (ref 4–13)
BUN SERPL-MCNC: 19 MG/DL (ref 5–25)
CALCIUM SERPL-MCNC: 9.6 MG/DL (ref 8.4–10.2)
CHLORIDE SERPL-SCNC: 104 MMOL/L (ref 96–108)
CO2 SERPL-SCNC: 30 MMOL/L (ref 21–32)
CREAT SERPL-MCNC: 0.8 MG/DL (ref 0.6–1.3)
ERYTHROCYTE [DISTWIDTH] IN BLOOD BY AUTOMATED COUNT: 14 % (ref 11.6–15.1)
GFR SERPL CREATININE-BSD FRML MDRD: 81 ML/MIN/1.73SQ M
GLUCOSE SERPL-MCNC: 100 MG/DL (ref 65–140)
HCT VFR BLD AUTO: 41.7 % (ref 34.8–46.1)
HGB BLD-MCNC: 13.3 G/DL (ref 11.5–15.4)
MCH RBC QN AUTO: 29.2 PG (ref 26.8–34.3)
MCHC RBC AUTO-ENTMCNC: 31.9 G/DL (ref 31.4–37.4)
MCV RBC AUTO: 92 FL (ref 82–98)
PLATELET # BLD AUTO: 360 THOUSANDS/UL (ref 149–390)
PMV BLD AUTO: 10.3 FL (ref 8.9–12.7)
POTASSIUM SERPL-SCNC: 4.3 MMOL/L (ref 3.5–5.3)
RBC # BLD AUTO: 4.55 MILLION/UL (ref 3.81–5.12)
SODIUM SERPL-SCNC: 141 MMOL/L (ref 135–147)
TSH SERPL DL<=0.05 MIU/L-ACNC: 2.91 UIU/ML (ref 0.45–4.5)
WBC # BLD AUTO: 5.61 THOUSAND/UL (ref 4.31–10.16)

## 2024-12-17 PROCEDURE — 84443 ASSAY THYROID STIM HORMONE: CPT

## 2024-12-17 PROCEDURE — 85027 COMPLETE CBC AUTOMATED: CPT

## 2024-12-17 PROCEDURE — 36415 COLL VENOUS BLD VENIPUNCTURE: CPT

## 2024-12-17 PROCEDURE — 80048 BASIC METABOLIC PNL TOTAL CA: CPT

## 2024-12-17 NOTE — PRE-PROCEDURE INSTRUCTIONS
Pre-Surgery Instructions:   Medication Instructions    Calcium Carb-Cholecalciferol 1000-800 MG-UNIT TABS Stop taking 7 days prior to surgery.    cetirizine (ZyrTEC) 10 mg tablet Hold day of surgery.    diltiazem (CARDIZEM CD) 180 mg 24 hr capsule Take day of surgery.    levothyroxine (Euthyrox) 25 mcg tablet Take day of surgery.    Magnesium 100 MG CAPS Stop taking 7 days prior to surgery.    Misc Natural Products (OSTEO BI-FLEX/5-LOXIN ADVANCED PO) Stop taking 7 days prior to surgery.    montelukast (SINGULAIR) 10 mg tablet Take night before surgery    Omega-3 Fatty Acids (FISH OIL) 645 MG CAPS Stop taking 7 days prior to surgery.    raloxifene (EVISTA) 60 mg tablet Take day of surgery.    rosuvastatin (CRESTOR) 5 mg tablet Take day of surgery.    Medication instructions for day surgery reviewed. Please use only a sip of water to take your instructed medications. Avoid all over the counter vitamins, supplements and NSAIDS for one week prior to surgery per anesthesia guidelines. Tylenol is ok to take as needed.     You will receive a call one business day prior to surgery with an arrival time and hospital directions. If your surgery is scheduled on a Monday, the hospital will be calling you on the Friday prior to your surgery. If you have not heard from anyone by 8pm, please call the hospital supervisor through the hospital  at [phone]. ([place] [phone] or [place] [phone]).    Do not eat or drink anything after midnight the night before your surgery, including candy, mints, lifesavers, or chewing gum. Do not drink alcohol 24hrs before your surgery. Try not to smoke at least 24hrs before your surgery.       Follow the pre surgery showering instructions as listed in the “My Surgical Experience Booklet” or otherwise provided by your surgeon's office. Do not use a blade to shave the surgical area 1 week before surgery. It is okay to use a clean electric clippers up to 24 hours before surgery.  Do not apply any lotions, creams, including makeup, cologne, deodorant, or perfumes after showering on the day of your surgery. Do not use dry shampoo, hair spray, hair gel, or any type of hair products.     No contact lenses, eye make-up, or artificial eyelashes. Remove nail polish, including gel polish, and any artificial, gel, or acrylic nails if possible. Remove all jewelry including rings and body piercing jewelry.     Wear causal clothing that is easy to take on and off. Consider your type of surgery.    Keep any valuables, jewelry, piercings at home. Please bring any specially ordered equipment (sling, braces) if indicated.    Arrange for a responsible person to drive you to and from the hospital on the day of your surgery. Please confirm the visitor policy for the day of your procedure when you receive your phone call with an arrival time.     Call the surgeon's office with any new illnesses, exposures, or additional questions prior to surgery.    Please reference your “My Surgical Experience Booklet” for additional information to prepare for your upcoming surgery.

## 2024-12-26 ENCOUNTER — ANESTHESIA EVENT (OUTPATIENT)
Dept: PERIOP | Facility: HOSPITAL | Age: 59
End: 2024-12-26
Payer: COMMERCIAL

## 2024-12-27 ENCOUNTER — HOSPITAL ENCOUNTER (OUTPATIENT)
Facility: HOSPITAL | Age: 59
Setting detail: OUTPATIENT SURGERY
Discharge: HOME | End: 2024-12-27
Attending: SURGERY | Admitting: SURGERY
Payer: COMMERCIAL

## 2024-12-27 ENCOUNTER — ANESTHESIA (OUTPATIENT)
Dept: PERIOP | Facility: HOSPITAL | Age: 59
End: 2024-12-27
Payer: COMMERCIAL

## 2024-12-27 VITALS
SYSTOLIC BLOOD PRESSURE: 103 MMHG | WEIGHT: 195 LBS | HEIGHT: 65 IN | OXYGEN SATURATION: 98 % | HEART RATE: 58 BPM | BODY MASS INDEX: 32.49 KG/M2 | DIASTOLIC BLOOD PRESSURE: 66 MMHG | RESPIRATION RATE: 16 BRPM | TEMPERATURE: 97.6 F

## 2024-12-27 DIAGNOSIS — K40.20 NON-RECURRENT BILATERAL INGUINAL HERNIA WITHOUT OBSTRUCTION OR GANGRENE: Primary | ICD-10-CM

## 2024-12-27 PROCEDURE — C1781 MESH (IMPLANTABLE): HCPCS | Performed by: SURGERY

## 2024-12-27 PROCEDURE — 49650 LAP ING HERNIA REPAIR INIT: CPT | Performed by: PHYSICIAN ASSISTANT

## 2024-12-27 PROCEDURE — NC001 PR NO CHARGE: Performed by: SURGERY

## 2024-12-27 PROCEDURE — 49650 LAP ING HERNIA REPAIR INIT: CPT | Performed by: SURGERY

## 2024-12-27 PROCEDURE — S2900 ROBOTIC SURGICAL SYSTEM: HCPCS | Performed by: SURGERY

## 2024-12-27 DEVICE — 3DMAX MID ANATOMICAL MESH, 10 CM X 16 CM (4" X 6"), LARGE, RIGHT
Type: IMPLANTABLE DEVICE | Site: INGUINAL | Status: FUNCTIONAL
Brand: 3DMAX

## 2024-12-27 DEVICE — 3DMAX MID ANATOMICAL MESH, 10 CM X 16 CM (4" X 6"), LARGE, LEFT
Type: IMPLANTABLE DEVICE | Site: INGUINAL | Status: FUNCTIONAL
Brand: 3DMAX

## 2024-12-27 RX ORDER — BUPIVACAINE HYDROCHLORIDE AND EPINEPHRINE 2.5; 5 MG/ML; UG/ML
INJECTION, SOLUTION EPIDURAL; INFILTRATION; INTRACAUDAL; PERINEURAL AS NEEDED
Status: DISCONTINUED | OUTPATIENT
Start: 2024-12-27 | End: 2024-12-27 | Stop reason: HOSPADM

## 2024-12-27 RX ORDER — DEXAMETHASONE SODIUM PHOSPHATE 10 MG/ML
INJECTION, SOLUTION INTRAMUSCULAR; INTRAVENOUS AS NEEDED
Status: DISCONTINUED | OUTPATIENT
Start: 2024-12-27 | End: 2024-12-27

## 2024-12-27 RX ORDER — SODIUM CHLORIDE, SODIUM LACTATE, POTASSIUM CHLORIDE, CALCIUM CHLORIDE 600; 310; 30; 20 MG/100ML; MG/100ML; MG/100ML; MG/100ML
125 INJECTION, SOLUTION INTRAVENOUS CONTINUOUS
Status: DISCONTINUED | OUTPATIENT
Start: 2024-12-27 | End: 2024-12-27 | Stop reason: HOSPADM

## 2024-12-27 RX ORDER — ONDANSETRON 2 MG/ML
4 INJECTION INTRAMUSCULAR; INTRAVENOUS EVERY 4 HOURS PRN
Status: DISCONTINUED | OUTPATIENT
Start: 2024-12-27 | End: 2024-12-27 | Stop reason: HOSPADM

## 2024-12-27 RX ORDER — KETOROLAC TROMETHAMINE 30 MG/ML
INJECTION, SOLUTION INTRAMUSCULAR; INTRAVENOUS AS NEEDED
Status: DISCONTINUED | OUTPATIENT
Start: 2024-12-27 | End: 2024-12-27

## 2024-12-27 RX ORDER — HYDROMORPHONE HCL/PF 1 MG/ML
0.5 SYRINGE (ML) INJECTION
Refills: 0 | Status: DISCONTINUED | OUTPATIENT
Start: 2024-12-27 | End: 2024-12-27 | Stop reason: HOSPADM

## 2024-12-27 RX ORDER — PROPOFOL 10 MG/ML
INJECTION, EMULSION INTRAVENOUS CONTINUOUS PRN
Status: DISCONTINUED | OUTPATIENT
Start: 2024-12-27 | End: 2024-12-27

## 2024-12-27 RX ORDER — LIDOCAINE HYDROCHLORIDE 10 MG/ML
INJECTION, SOLUTION EPIDURAL; INFILTRATION; INTRACAUDAL; PERINEURAL AS NEEDED
Status: DISCONTINUED | OUTPATIENT
Start: 2024-12-27 | End: 2024-12-27

## 2024-12-27 RX ORDER — SODIUM CHLORIDE, SODIUM LACTATE, POTASSIUM CHLORIDE, CALCIUM CHLORIDE 600; 310; 30; 20 MG/100ML; MG/100ML; MG/100ML; MG/100ML
INJECTION, SOLUTION INTRAVENOUS CONTINUOUS PRN
Status: DISCONTINUED | OUTPATIENT
Start: 2024-12-27 | End: 2024-12-27

## 2024-12-27 RX ORDER — OXYCODONE HYDROCHLORIDE 5 MG/1
5 TABLET ORAL EVERY 4 HOURS PRN
Qty: 10 TABLET | Refills: 0 | Status: SHIPPED | OUTPATIENT
Start: 2024-12-27 | End: 2025-01-06

## 2024-12-27 RX ORDER — KETAMINE HCL IN NACL, ISO-OSM 100MG/10ML
SYRINGE (ML) INJECTION AS NEEDED
Status: DISCONTINUED | OUTPATIENT
Start: 2024-12-27 | End: 2024-12-27

## 2024-12-27 RX ORDER — FENTANYL CITRATE/PF 50 MCG/ML
25 SYRINGE (ML) INJECTION
Status: DISCONTINUED | OUTPATIENT
Start: 2024-12-27 | End: 2024-12-27 | Stop reason: HOSPADM

## 2024-12-27 RX ORDER — CEFAZOLIN SODIUM 2 G/50ML
2000 SOLUTION INTRAVENOUS ONCE
Status: COMPLETED | OUTPATIENT
Start: 2024-12-27 | End: 2024-12-27

## 2024-12-27 RX ORDER — HYDROMORPHONE HCL IN WATER/PF 6 MG/30 ML
0.2 PATIENT CONTROLLED ANALGESIA SYRINGE INTRAVENOUS ONCE AS NEEDED
Status: DISCONTINUED | OUTPATIENT
Start: 2024-12-27 | End: 2024-12-27 | Stop reason: HOSPADM

## 2024-12-27 RX ORDER — PROPOFOL 10 MG/ML
INJECTION, EMULSION INTRAVENOUS AS NEEDED
Status: DISCONTINUED | OUTPATIENT
Start: 2024-12-27 | End: 2024-12-27

## 2024-12-27 RX ORDER — GLYCOPYRROLATE 0.2 MG/ML
INJECTION INTRAMUSCULAR; INTRAVENOUS AS NEEDED
Status: DISCONTINUED | OUTPATIENT
Start: 2024-12-27 | End: 2024-12-27

## 2024-12-27 RX ORDER — OXYCODONE HYDROCHLORIDE 5 MG/1
5 TABLET ORAL EVERY 4 HOURS PRN
Refills: 0 | Status: DISCONTINUED | OUTPATIENT
Start: 2024-12-27 | End: 2024-12-27 | Stop reason: HOSPADM

## 2024-12-27 RX ORDER — EPHEDRINE SULFATE 50 MG/ML
INJECTION INTRAVENOUS AS NEEDED
Status: DISCONTINUED | OUTPATIENT
Start: 2024-12-27 | End: 2024-12-27

## 2024-12-27 RX ORDER — MIDAZOLAM HYDROCHLORIDE 2 MG/2ML
INJECTION, SOLUTION INTRAMUSCULAR; INTRAVENOUS AS NEEDED
Status: DISCONTINUED | OUTPATIENT
Start: 2024-12-27 | End: 2024-12-27

## 2024-12-27 RX ORDER — ONDANSETRON 2 MG/ML
INJECTION INTRAMUSCULAR; INTRAVENOUS AS NEEDED
Status: DISCONTINUED | OUTPATIENT
Start: 2024-12-27 | End: 2024-12-27

## 2024-12-27 RX ORDER — ROCURONIUM BROMIDE 10 MG/ML
INJECTION, SOLUTION INTRAVENOUS AS NEEDED
Status: DISCONTINUED | OUTPATIENT
Start: 2024-12-27 | End: 2024-12-27

## 2024-12-27 RX ORDER — FENTANYL CITRATE 50 UG/ML
INJECTION, SOLUTION INTRAMUSCULAR; INTRAVENOUS AS NEEDED
Status: DISCONTINUED | OUTPATIENT
Start: 2024-12-27 | End: 2024-12-27

## 2024-12-27 RX ORDER — DIPHENHYDRAMINE HYDROCHLORIDE 50 MG/ML
12.5 INJECTION INTRAMUSCULAR; INTRAVENOUS ONCE AS NEEDED
Status: DISCONTINUED | OUTPATIENT
Start: 2024-12-27 | End: 2024-12-27 | Stop reason: HOSPADM

## 2024-12-27 RX ORDER — ONDANSETRON 2 MG/ML
4 INJECTION INTRAMUSCULAR; INTRAVENOUS ONCE AS NEEDED
Status: DISCONTINUED | OUTPATIENT
Start: 2024-12-27 | End: 2024-12-27 | Stop reason: HOSPADM

## 2024-12-27 RX ADMIN — DEXMEDETOMIDINE 8 MCG: 100 INJECTION, SOLUTION INTRAVENOUS at 07:33

## 2024-12-27 RX ADMIN — Medication 30 MG: at 07:33

## 2024-12-27 RX ADMIN — EPHEDRINE SULFATE 15 MG: 50 INJECTION INTRAVENOUS at 07:51

## 2024-12-27 RX ADMIN — FENTANYL CITRATE 50 MCG: 50 INJECTION INTRAMUSCULAR; INTRAVENOUS at 07:33

## 2024-12-27 RX ADMIN — SUGAMMADEX 200 MG: 100 INJECTION, SOLUTION INTRAVENOUS at 08:58

## 2024-12-27 RX ADMIN — GLYCOPYRROLATE 0.2 MG: 0.2 INJECTION INTRAMUSCULAR; INTRAVENOUS at 07:43

## 2024-12-27 RX ADMIN — PROPOFOL 50 MG: 10 INJECTION, EMULSION INTRAVENOUS at 08:57

## 2024-12-27 RX ADMIN — KETOROLAC TROMETHAMINE 30 MG: 30 INJECTION, SOLUTION INTRAMUSCULAR; INTRAVENOUS at 08:51

## 2024-12-27 RX ADMIN — PROPOFOL 50 MG: 10 INJECTION, EMULSION INTRAVENOUS at 07:35

## 2024-12-27 RX ADMIN — CEFAZOLIN SODIUM 2000 MG: 2 SOLUTION INTRAVENOUS at 07:40

## 2024-12-27 RX ADMIN — Medication 10 MG: at 08:08

## 2024-12-27 RX ADMIN — ONDANSETRON 4 MG: 2 INJECTION, SOLUTION INTRAMUSCULAR; INTRAVENOUS at 08:54

## 2024-12-27 RX ADMIN — LIDOCAINE HYDROCHLORIDE 50 MG: 10 INJECTION, SOLUTION EPIDURAL; INFILTRATION; INTRACAUDAL at 07:33

## 2024-12-27 RX ADMIN — SODIUM CHLORIDE, SODIUM LACTATE, POTASSIUM CHLORIDE, AND CALCIUM CHLORIDE: .6; .31; .03; .02 INJECTION, SOLUTION INTRAVENOUS at 07:18

## 2024-12-27 RX ADMIN — PROPOFOL 120 MG: 10 INJECTION, EMULSION INTRAVENOUS at 07:33

## 2024-12-27 RX ADMIN — FENTANYL CITRATE 50 MCG: 50 INJECTION INTRAMUSCULAR; INTRAVENOUS at 07:40

## 2024-12-27 RX ADMIN — ROCURONIUM 50 MG: 50 INJECTION, SOLUTION INTRAVENOUS at 07:33

## 2024-12-27 RX ADMIN — PROPOFOL 50 MCG/KG/MIN: 10 INJECTION, EMULSION INTRAVENOUS at 07:37

## 2024-12-27 RX ADMIN — ROCURONIUM 5 MG: 50 INJECTION, SOLUTION INTRAVENOUS at 08:41

## 2024-12-27 RX ADMIN — MIDAZOLAM 2 MG: 1 INJECTION INTRAMUSCULAR; INTRAVENOUS at 07:25

## 2024-12-27 RX ADMIN — DEXAMETHASONE SODIUM PHOSPHATE 10 MG: 10 INJECTION INTRAMUSCULAR; INTRAVENOUS at 07:33

## 2024-12-27 RX ADMIN — ROCURONIUM 20 MG: 50 INJECTION, SOLUTION INTRAVENOUS at 08:02

## 2024-12-27 RX ADMIN — PROPOFOL 50 MG: 10 INJECTION, EMULSION INTRAVENOUS at 08:53

## 2024-12-27 RX ADMIN — Medication 10 MG: at 08:04

## 2024-12-27 RX ADMIN — DEXMEDETOMIDINE 4 MCG: 100 INJECTION, SOLUTION INTRAVENOUS at 09:01

## 2024-12-27 RX ADMIN — SODIUM CHLORIDE, SODIUM LACTATE, POTASSIUM CHLORIDE, CALCIUM CHLORIDE: 600; 310; 30; 20 INJECTION, SOLUTION INTRAVENOUS at 07:37

## 2024-12-27 NOTE — ANESTHESIA PREPROCEDURE EVALUATION
Procedure:  REPAIR HERNIA INGUINAL LAPAROSCOPIC W/ ROBOTICS (Bilateral: Groin)    EKG 11/2024  NSR 53 1st AV block     Relevant Problems   CARDIO   (+) PAC (premature atrial contraction)   (+) PSVT (paroxysmal supraventricular tachycardia) (HCC)      MUSCULOSKELETAL   (+) Primary osteoarthritis of right knee     Chest palpitations, cleared for surgery by cardiology Dr Garza in note 11/14/24     Meds  Diltiazem this AM  Montelukast LAST NIGHT    Mets  >4    NPO?: 1030pm    Physical Exam    Airway    Mallampati score: II         Dental   No notable dental hx     Cardiovascular  Cardiovascular exam normal    Pulmonary  Pulmonary exam normal     Other Findings        Anesthesia Plan  ASA Score- 2     Anesthesia Type- general with ASA Monitors.         Additional Monitors:     Airway Plan: ETT.           Plan Factors-Exercise tolerance (METS): >4 METS.    Chart reviewed. EKG reviewed.   Patient summary reviewed.                  Induction- intravenous.    Postoperative Plan- Plan for postoperative opioid use. Planned trial extubation        Informed Consent- Anesthetic plan and risks discussed with patient.  I personally reviewed this patient with the CRNA. Discussed and agreed on the Anesthesia Plan with the CRNA..

## 2024-12-27 NOTE — ANESTHESIA POSTPROCEDURE EVALUATION
Post-Op Assessment Note    CV Status:  Stable  Pain Score: 0    Pain management: adequate       Mental Status:  Alert   Hydration Status:  Stable   PONV Controlled:  None   Airway Patency:  Patent     Post Op Vitals Reviewed: Yes    No anethesia notable event occurred.    Staff: CRNA           Last Filed PACU Vitals:  Vitals Value Taken Time   Temp 97    Pulse 80    /77    Resp 14    SpO2 97        Modified Dustin:  No data recorded

## 2024-12-27 NOTE — H&P
History and Physical -General Surgical Care   Veronica Herrera 59 y.o. female MRN: [id number]  Unit/Bed#: OR POOL Encounter: [id number]       Principal Problem: B/L inguinal hernias    HPI: Veronica Herrera is a 59 y.o. year old female who presents with above.  [facility] office notes 11/06/24      Review of Systems    Historical Information   Past Medical History:   Diagnosis Date    Abnormal ECG     Arthritis     Asthma     exercised induced    Colon polyp     Last colonoscopy    Disease of thyroid gland     Hypercholesterolemia     PAC (premature atrial contraction)     Palpitations     Paroxysmal SVT (supraventricular tachycardia) (HCC)      Past Surgical History:   Procedure Laterality Date    BREAST BIOPSY Right 03/03/2003    stereotactic core bx    COLONOSCOPY      DILATION AND CURETTAGE OF UTERUS  2003    ENDOMETRIAL ABLATION  11/2009    OOPHORECTOMY Right 04/2012    Robotic assisted laparoscopic RSO, excision endometriosis, elevated      Social History   Social History     Substance and Sexual Activity   Alcohol Use Yes    Comment: occassional     Social History     Substance and Sexual Activity   Drug Use No     Social History     Tobacco Use   Smoking Status Former    Passive exposure: Never   Smokeless Tobacco Never   Tobacco Comments    quit in 20s      Family History   Problem Relation Age of Onset    Cancer Mother         Non hodgkins lymphoma    Stroke Mother     Dementia Mother     Breast cancer Mother 64    Hyperlipidemia Mother     Arrhythmia Father     Hypertension Father     Atrial fibrillation Father     Melanoma Sister 48        with mets    No Known Problems Brother     No Known Problems Maternal Grandmother     Cancer Maternal Grandfather     No Known Problems Paternal Grandmother     No Known Problems Paternal Grandfather     No Known Problems Daughter     No Known Problems Daughter     No Known Problems Paternal Aunt        Meds/Allergies       Medications Prior to Admission:      "Calcium Carb-Cholecalciferol 1000-800 MG-UNIT TABS    cetirizine (ZyrTEC) 10 mg tablet    diltiazem (CARDIZEM CD) 180 mg 24 hr capsule    levothyroxine (Euthyrox) 25 mcg tablet    Magnesium 100 MG CAPS    Misc Natural Products (OSTEO BI-FLEX/5-LOXIN ADVANCED PO)    montelukast (SINGULAIR) 10 mg tablet    Omega-3 Fatty Acids (FISH OIL) 645 MG CAPS    raloxifene (EVISTA) 60 mg tablet    rosuvastatin (CRESTOR) 5 mg tablet    hyoscyamine (ANASPAZ,LEVSIN) 0.125 MG tablet    valACYclovir (VALTREX) 1,000 mg tablet    Current Facility-Administered Medications:     ceFAZolin (ANCEF) IVPB (premix in dextrose) 2,000 mg 50 mL, Once    No Known Allergies        Blood pressure 124/68, pulse (!) 51, temperature 97.5 °F (36.4 °C), temperature source Temporal, resp. rate 18, height 5' 5\" (1.651 m), weight 88.5 kg (195 lb), last menstrual period 02/28/2019, SpO2 98%, not currently breastfeeding.    No intake or output data in the 24 hours ending 12/27/24 0716    PHYSICAL EXAM  General appearance: alert and oriented, in no acute distress  Lungs: clear to auscultation bilaterally  Heart: regular rate and rhythm, S1, S2 normal, no murmur, click, rub or gallop  Abdomen: soft, non-tender; bowel sounds normal; no masses,  no organomegaly. B/L Inguinal hernias on standin position  Rectal: deferred  Skin: Skin color, texture, turgor normal. No rashes or lesions    Lab Results:   No visits with results within 1 Day(s) from this visit.   Latest known visit with results is:   Appointment on 12/17/2024   Component Date Value    WBC 12/17/2024 5.61     RBC 12/17/2024 4.55     Hemoglobin 12/17/2024 13.3     Hematocrit 12/17/2024 41.7     MCV 12/17/2024 92     MCH 12/17/2024 29.2     MCHC 12/17/2024 31.9     RDW 12/17/2024 14.0     Platelets 12/17/2024 360     MPV 12/17/2024 10.3     Sodium 12/17/2024 141     Potassium 12/17/2024 4.3     Chloride 12/17/2024 104     CO2 12/17/2024 30     ANION GAP 12/17/2024 7     BUN 12/17/2024 19     Creatinine " 12/17/2024 0.80     Glucose 12/17/2024 100     Calcium 12/17/2024 9.6     eGFR 12/17/2024 81     TSH 3RD GENERATON 12/17/2024 2.906      Imaging Studies:     ASSESSMENT: B/L Inguinal hernias    PLAN: reviewed risks/benefits of robotic B/L inguinal hernia repair and she agrees  Has seen cardiology for clearance    Counseling / Coordination of Care  Total time spent today  20 minutes. Greater than 50% of total time was spent with the patient and / or family counseling and / or coordination of care.

## 2024-12-27 NOTE — OP NOTE
OPERATIVE REPORT  PATIENT NAME: Veronica Herrera    :  1965  MRN: [id number]  Pt Location: AN OR ROOM 04    SURGERY DATE: 2024    Surgeons and Role:     * Eduardo Reyes, DO - Primary     * Maria G Terrazas PA-C - Assisting  I was present for the entire procedure. A qualified resident physician was not available, and a physician assistant was necessary to provide expert assistance in the form of providing optimal exposure with retraction, suturing, and assistance with dissection in order to perform the most efficient operation and in order to optimize patient safety in the absence of a qualified surgical resident.  \    Preop Diagnosis:  Bilateral inguinal hernia [K40.20]    Post-Op Diagnosis Codes:     * Bilateral inguinal hernia [K40.20]  Umbilical hernia      Procedure(s):  Bilateral - REPAIR HERNIA INGUINAL LAPAROSCOPIC W/ ROBOTICS AND BILATERAL MESH; UMBILICAL HERNIA REPAIR, no mesh    Specimen(s):  * No specimens in log *    Estimated Blood Loss:   25 mL    Drains:  * No LDAs found *    Anesthesia Type:   General    Operative Indications:  Bilateral inguinal hernia [K40.20]      Operative Findings:  Bilateral inguinal hernia (left indirect and right direct) incidental umbilical hernia, 1 cm  Height 65 inches weight 89 kg / 195 pounds.  BMI 32 ASA 2.  Wound class      Complications:   None    Procedure and Technique:  Patient was brought the operative suite and identified by visualization, conversation, by armband sequential compression pumps were placed.  She was given Ancef perioperatively.  Once under anesthesia abdomen was prepped and draped in a sterile fashion.  Timeout was performed and was assured that the prep was dry.  Local was instilled at the supraumbilical fold.  Curvilinear skin incision was made.  In dissecting down to the fascia small umbilical hernia was noted with some preperitoneal fat this was trimmed away.  Small 1 cm defect was noted.  8 mm working robotic port  was placed through this and pneumoperitoneum was established.  2 other 8 mm working robotic ports were then placed in their respective positions in each side.  She was placed in Trendelenburg.  The robot was then docked.  I then went to the robotic console.  The left-hand side was initially addressed  Preperitoneal flap was scored and raised to expose an indirect left inguinal hernia.  This was carefully dissected out until the entire myopectineal orifice was noted.  A large 3D max mesh was chosen and placed across the orifice to adequately cover the defect.  2-0 Vicryl was used to secure to the pubic tubercle, rectus muscle, and lateral to the epigastric vessels.  Back pressure was turned down to 10 mmHg.  Peritoneal flap was reapproximated to completely cover the mesh with a 2-0 PDS STRATAFIX suture.  Needles were then retrieved.    We then went to the right side.  Back pressure was turned back up to 15 mmHg.  In a similar fashion peritoneum was scored and the peritoneal flap was raised to expose the myopectineal orifice.  She had a direct inguinal hernia on this side.  Once all the peritoneum was completely dissected out to completely identify the myopectineal orifice another large 3D max mesh was chosen.  In a similar fashion was anchored to the pubic tubercle, rectus muscle, and lateral to the epigastric vessels with 2-0 Vicryl adequately covering the defect.  Back pressure was turned back down to 10 mmHg.    Flap was reapproximated with a 2-0 PDS STRATAFIX suture small hole in the peritoneum was closed using the same tube PDS STRATAFIX.  Some of the preperitoneal fat was also used to cover this area.  Both sides were inspected.  All mesh was completely covered.  Needles were retrieved.    I then returned to the operative field.  Robotic instruments were removed and pneumoperitoneum was released.  Trocars were also removed.  Fascia at the umbilical trocar/hernia site was closed using 0 Vicryl in a  figure-of-eight fashion.  Local was instilled.  4-0 Monocryl was used to close skin incisions in a subcuticular fashion.  Wounds were washed and dried.  Sterile skin glue was applied.    She was awakened in the operating room and returned to the recovery area in stable condition having tolerated the procedure well.   I was present for the entire procedure.    Patient Disposition:  PACU              SIGNATURE: Eduardo Reyes DO  DATE: December 27, 2024  TIME: 8:54 AM

## 2024-12-27 NOTE — DISCHARGE INSTR - AVS FIRST PAGE
Please call the office when you leave to schedule an appointment to be seen in 2-3 weeks    Activity:    Do not lift more than 25 pounds for 4 weeks post-operatively                 Walking is encouraged  Normal daily activities including climbing steps are okay  Do not engage in strenuous activity or contact sports for 4-6 weeks post-operatively    Return to work:   Return to work to be discussed at first post-operative visit    Diet:   You may return to your normal heart healthy diet    Wound Care:  Your wound is closed with skin glue  It is okay to shower. Wash incision gently with soap and water and pat dry. Do not soak incisions in bath water or swim for two weeks. Do not apply any creams or ointments.  Apply ice as needed to incisions as well as both groins.  Recommend use ice for the next 4 to 5 days  Pain Medication:   Please take as directed  No driving while taking narcotic pain medications    Other:  If you have questions after discharge please call the office.    If you have increased pain, fever >101.5, increased drainage, redness or a bad smell at your surgery site, please call us immediately or come directly to the Emergency Room

## 2024-12-28 NOTE — ANESTHESIA POSTPROCEDURE EVALUATION
Post-Op Assessment Note    CV Status:  Stable    Pain management: adequate       Mental Status:  Alert and awake   Hydration Status:  Euvolemic   PONV Controlled:  Controlled   Airway Patency:  Patent     Post Op Vitals Reviewed: Yes    No anethesia notable event occurred.    Staff: Anesthesiologist, CRNA           Last Filed PACU Vitals:  Vitals Value Taken Time   Temp 97.6 °F (36.4 °C) 12/27/24 0930   Pulse 60 12/27/24 0945   /64 12/27/24 0945   Resp 12 12/27/24 0945   SpO2 97 % 12/27/24 0945       Modified Dustin:  Activity: 2 (12/27/2024 10:15 AM)  Respiration: 2 (12/27/2024 10:15 AM)  Circulation: 2 (12/27/2024 10:15 AM)  Consciousness: 2 (12/27/2024 10:15 AM)  Oxygen Saturation: 2 (12/27/2024 10:15 AM)  Modified Dustin Score: 10 (12/27/2024 10:15 AM)

## 2025-01-09 ENCOUNTER — OFFICE VISIT (OUTPATIENT)
Dept: SURGERY | Facility: CLINIC | Age: 60
End: 2025-01-09

## 2025-01-09 DIAGNOSIS — K40.20 BILATERAL INGUINAL HERNIA: Primary | ICD-10-CM

## 2025-01-09 DIAGNOSIS — K42.9 UMBILICAL HERNIA WITHOUT OBSTRUCTION AND WITHOUT GANGRENE: ICD-10-CM

## 2025-01-09 PROCEDURE — 99024 POSTOP FOLLOW-UP VISIT: CPT | Performed by: SURGERY

## 2025-01-09 NOTE — LETTER
January 9, 2025     Julia Loza,   [address] [facility]  [address]  [facility] [address]    Patient: Veronica Herrera   YOB: 1965   Date of Visit: 1/9/2025       Dear Dr. Zohra Loza:    Thank you for referring Veronica Herrera to me for evaluation. Below are my notes for this consultation.    If you have questions, please do not hesitate to call me. I look forward to following your patient along with you.         Sincerely,        Eduardo Reyes DO        CC: No Recipients    Eduardo Reyes DO  1/9/2025  9:39 AM  Sign when Signing Visit  Assessment/Plan:    Diagnoses and all orders for this visit:    Bilateral inguinal hernia    Umbilical hernia without obstruction and without gangrene    Status post robotic assisted bilateral inguinal hernia repair as well as incidental umbilical hernia repair (no mesh).  Doing quite well.  Expect some soreness over the next several weeks.  May resume unrestricted activity as of January 27      Postoperative restrictions reviewed. All questions answered.       ______________________________________________________  HPI: Patient presents post operatively.  Bilateral inguinal hernia repair laparoscopic with robotics with mesh and umbilical hernia repair 12/27/2024.    Initial soreness noted the first week after surgery.  Dramatically improved at this point.  No particular issues          ROS:  General ROS: negative for - chills, fatigue, fever or night sweats, weight loss  Respiratory ROS: no cough, shortness of breath, or wheezing  Cardiovascular ROS: no chest pain or dyspnea on exertion  Genito-Urinary ROS: no dysuria, trouble voiding, or hematuria  Musculoskeletal ROS: negative for - gait disturbance, joint pain or muscle pain  Neurological ROS: no TIA or stroke symptoms  GI ROS: see HPI  Skin ROS: no new rashes or lesions   Lymphatic ROS: no new adenopathy noted by pt.   GYN ROS: see HPI, no new GYN history or bleeding noted  Psy ROS: no  new mental or behavioral disturbances         Patient Active Problem List   Diagnosis   • Dyslipidemia   • IBS (irritable bowel syndrome)   • Diverticulitis   • Corneal abrasion   • PSVT (paroxysmal supraventricular tachycardia) (HCC)   • Seasonal allergies   • PAC (premature atrial contraction)   • Mixed dyslipidemia   • Palpitation   • Achilles tendinitis of right lower extremity   • Acute medial meniscus tear, right, subsequent encounter   • Acute pain of right knee   • Primary osteoarthritis of right knee   • Bilateral inguinal hernia   • Preop cardiovascular exam   • Umbilical hernia without obstruction and without gangrene       Allergies:  Patient has no known allergies.      Current Outpatient Medications:   •  Calcium Carb-Cholecalciferol 1000-800 MG-UNIT TABS, Take 1 tablet by mouth daily, Disp: , Rfl:   •  cetirizine (ZyrTEC) 10 mg tablet, Take 10 mg by mouth daily, Disp: , Rfl:   •  diltiazem (CARDIZEM CD) 180 mg 24 hr capsule, Take 1 capsule (180 mg total) by mouth daily, Disp: 90 capsule, Rfl: 3  •  hyoscyamine (ANASPAZ,LEVSIN) 0.125 MG tablet, Take 1 tablet (0.125 mg total) by mouth every 4 (four) hours as needed for cramping, Disp: 30 tablet, Rfl: 0  •  levothyroxine (Euthyrox) 25 mcg tablet, Take 1 tablet (25 mcg total) by mouth daily in the early morning, Disp: 90 tablet, Rfl: 3  •  Magnesium 100 MG CAPS, Take by mouth in the morning, Disp: , Rfl:   •  Misc Natural Products (OSTEO BI-FLEX/5-LOXIN ADVANCED PO), Take by mouth in the morning, Disp: , Rfl:   •  montelukast (SINGULAIR) 10 mg tablet, TAKE 1 TABLET DAILY AT BEDTIME, Disp: 100 tablet, Rfl: 1  •  Omega-3 Fatty Acids (FISH OIL) 645 MG CAPS, Take 1 capsule by mouth 2 (two) times a day , Disp: , Rfl:   •  raloxifene (EVISTA) 60 mg tablet, TAKE 1 TABLET DAILY, Disp: 90 tablet, Rfl: 3  •  rosuvastatin (CRESTOR) 5 mg tablet, Take 1 tablet (5 mg total) by mouth daily, Disp: 90 tablet, Rfl: 3  •  valACYclovir (VALTREX) 1,000 mg tablet, Take 1  tablet by mouth 3 (three) times a day as needed, Disp: , Rfl:     Past Medical History:   Diagnosis Date   • Abnormal ECG    • Arthritis    • Asthma     exercised induced   • Colon polyp     Last colonoscopy   • Disease of thyroid gland    • Hypercholesterolemia    • PAC (premature atrial contraction)    • Palpitations    • Paroxysmal SVT (supraventricular tachycardia) (HCC)        Past Surgical History:   Procedure Laterality Date   • BREAST BIOPSY Right 03/03/2003    stereotactic core bx   • COLONOSCOPY     • DILATION AND CURETTAGE OF UTERUS  2003   • ENDOMETRIAL ABLATION  11/2009   • OOPHORECTOMY Right 04/2012    Robotic assisted laparoscopic RSO, excision endometriosis, elevated    • [place] LAPAROSCOPY SURG RPR INITIAL INGUINAL HERNIA Bilateral 12/27/2024    Procedure: REPAIR HERNIA INGUINAL LAPAROSCOPIC W/ ROBOTICS AND BILATERAL MESH; UMBILICAL HERNIA REPAIR;  Surgeon: Eduardo Reyes DO;  Location: [facility];  Service: General       Family History   Problem Relation Age of Onset   • Cancer Mother         Non hodgkins lymphoma   • Stroke Mother    • Dementia Mother    • Breast cancer Mother 64   • Hyperlipidemia Mother    • Arrhythmia Father    • Hypertension Father    • Atrial fibrillation Father    • Melanoma Sister 48        with mets   • No Known Problems Brother    • No Known Problems Maternal Grandmother    • Cancer Maternal Grandfather    • No Known Problems Paternal Grandmother    • No Known Problems Paternal Grandfather    • No Known Problems Daughter    • No Known Problems Daughter    • No Known Problems Paternal Aunt         reports that she has quit smoking. She has never been exposed to tobacco smoke. She has never used smokeless tobacco. She reports current alcohol use. She reports that she does not use drugs.    PHYSICAL EXAM    LMP 02/28/2019 (Exact Date)     General: normal, cooperative, no distress  Abdominal: soft, nondistended, or nontender  Incision: clean, dry, and intact and  healing well.  Well-healed trocar sites.  No signs of hernia recurrence at either groin or umbilicus      Eduardo Reyes,     Date: 1/9/2025 Time: 9:38 AM

## 2025-01-09 NOTE — PROGRESS NOTES
Assessment/Plan:    Diagnoses and all orders for this visit:    Bilateral inguinal hernia    Umbilical hernia without obstruction and without gangrene    Status post robotic assisted bilateral inguinal hernia repair as well as incidental umbilical hernia repair (no mesh).  Doing quite well.  Expect some soreness over the next several weeks.  May resume unrestricted activity as of January 27      Postoperative restrictions reviewed. All questions answered.       ______________________________________________________  HPI: Patient presents post operatively.  Bilateral inguinal hernia repair laparoscopic with robotics with mesh and umbilical hernia repair 12/27/2024.    Initial soreness noted the first week after surgery.  Dramatically improved at this point.  No particular issues          ROS:  General ROS: negative for - chills, fatigue, fever or night sweats, weight loss  Respiratory ROS: no cough, shortness of breath, or wheezing  Cardiovascular ROS: no chest pain or dyspnea on exertion  Genito-Urinary ROS: no dysuria, trouble voiding, or hematuria  Musculoskeletal ROS: negative for - gait disturbance, joint pain or muscle pain  Neurological ROS: no TIA or stroke symptoms  GI ROS: see HPI  Skin ROS: no new rashes or lesions   Lymphatic ROS: no new adenopathy noted by pt.   GYN ROS: see HPI, no new GYN history or bleeding noted  Psy ROS: no new mental or behavioral disturbances         Patient Active Problem List   Diagnosis    Dyslipidemia    IBS (irritable bowel syndrome)    Diverticulitis    Corneal abrasion    PSVT (paroxysmal supraventricular tachycardia) (HCC)    Seasonal allergies    PAC (premature atrial contraction)    Mixed dyslipidemia    Palpitation    Achilles tendinitis of right lower extremity    Acute medial meniscus tear, right, subsequent encounter    Acute pain of right knee    Primary osteoarthritis of right knee    Bilateral inguinal hernia    Preop cardiovascular exam    Umbilical hernia  without obstruction and without gangrene       Allergies:  Patient has no known allergies.      Current Outpatient Medications:     Calcium Carb-Cholecalciferol 1000-800 MG-UNIT TABS, Take 1 tablet by mouth daily, Disp: , Rfl:     cetirizine (ZyrTEC) 10 mg tablet, Take 10 mg by mouth daily, Disp: , Rfl:     diltiazem (CARDIZEM CD) 180 mg 24 hr capsule, Take 1 capsule (180 mg total) by mouth daily, Disp: 90 capsule, Rfl: 3    hyoscyamine (ANASPAZ,LEVSIN) 0.125 MG tablet, Take 1 tablet (0.125 mg total) by mouth every 4 (four) hours as needed for cramping, Disp: 30 tablet, Rfl: 0    levothyroxine (Euthyrox) 25 mcg tablet, Take 1 tablet (25 mcg total) by mouth daily in the early morning, Disp: 90 tablet, Rfl: 3    Magnesium 100 MG CAPS, Take by mouth in the morning, Disp: , Rfl:     Misc Natural Products (OSTEO BI-FLEX/5-LOXIN ADVANCED PO), Take by mouth in the morning, Disp: , Rfl:     montelukast (SINGULAIR) 10 mg tablet, TAKE 1 TABLET DAILY AT BEDTIME, Disp: 100 tablet, Rfl: 1    Omega-3 Fatty Acids (FISH OIL) 645 MG CAPS, Take 1 capsule by mouth 2 (two) times a day , Disp: , Rfl:     raloxifene (EVISTA) 60 mg tablet, TAKE 1 TABLET DAILY, Disp: 90 tablet, Rfl: 3    rosuvastatin (CRESTOR) 5 mg tablet, Take 1 tablet (5 mg total) by mouth daily, Disp: 90 tablet, Rfl: 3    valACYclovir (VALTREX) 1,000 mg tablet, Take 1 tablet by mouth 3 (three) times a day as needed, Disp: , Rfl:     Past Medical History:   Diagnosis Date    Abnormal ECG     Arthritis     Asthma     exercised induced    Colon polyp     Last colonoscopy    Disease of thyroid gland     Hypercholesterolemia     PAC (premature atrial contraction)     Palpitations     Paroxysmal SVT (supraventricular tachycardia) (HCC)        Past Surgical History:   Procedure Laterality Date    BREAST BIOPSY Right 03/03/2003    stereotactic core bx    COLONOSCOPY      DILATION AND CURETTAGE OF UTERUS  2003    ENDOMETRIAL ABLATION  11/2009    OOPHORECTOMY Right 04/2012     Robotic assisted laparoscopic RSO, excision endometriosis, elevated     [place] LAPAROSCOPY SURG RPR INITIAL INGUINAL HERNIA Bilateral 12/27/2024    Procedure: REPAIR HERNIA INGUINAL LAPAROSCOPIC W/ ROBOTICS AND BILATERAL MESH; UMBILICAL HERNIA REPAIR;  Surgeon: Eduardo Reyes DO;  Location: [facility];  Service: General       Family History   Problem Relation Age of Onset    Cancer Mother         Non hodgkins lymphoma    Stroke Mother     Dementia Mother     Breast cancer Mother 64    Hyperlipidemia Mother     Arrhythmia Father     Hypertension Father     Atrial fibrillation Father     Melanoma Sister 48        with mets    No Known Problems Brother     No Known Problems Maternal Grandmother     Cancer Maternal Grandfather     No Known Problems Paternal Grandmother     No Known Problems Paternal Grandfather     No Known Problems Daughter     No Known Problems Daughter     No Known Problems Paternal Aunt         reports that she has quit smoking. She has never been exposed to tobacco smoke. She has never used smokeless tobacco. She reports current alcohol use. She reports that she does not use drugs.    PHYSICAL EXAM    LMP 02/28/2019 (Exact Date)     General: normal, cooperative, no distress  Abdominal: soft, nondistended, or nontender  Incision: clean, dry, and intact and healing well.  Well-healed trocar sites.  No signs of hernia recurrence at either groin or umbilicus      Eduardo Reyes DO    Date: 1/9/2025 Time: 9:38 AM

## 2025-01-24 ENCOUNTER — OFFICE VISIT (OUTPATIENT)
Dept: FAMILY MEDICINE CLINIC | Facility: CLINIC | Age: 60
End: 2025-01-24
Payer: COMMERCIAL

## 2025-01-24 VITALS
SYSTOLIC BLOOD PRESSURE: 118 MMHG | TEMPERATURE: 97 F | DIASTOLIC BLOOD PRESSURE: 80 MMHG | HEART RATE: 59 BPM | BODY MASS INDEX: 32.99 KG/M2 | WEIGHT: 198 LBS | HEIGHT: 65 IN | OXYGEN SATURATION: 98 %

## 2025-01-24 DIAGNOSIS — H01.004 BLEPHARITIS OF LEFT UPPER EYELID, UNSPECIFIED TYPE: ICD-10-CM

## 2025-01-24 DIAGNOSIS — I47.10 PSVT (PAROXYSMAL SUPRAVENTRICULAR TACHYCARDIA) (HCC): ICD-10-CM

## 2025-01-24 DIAGNOSIS — Z00.00 ANNUAL PHYSICAL EXAM: Primary | ICD-10-CM

## 2025-01-24 DIAGNOSIS — R63.8 INCREASED BMI: ICD-10-CM

## 2025-01-24 DIAGNOSIS — E03.9 HYPOTHYROIDISM, UNSPECIFIED TYPE: ICD-10-CM

## 2025-01-24 PROCEDURE — 99396 PREV VISIT EST AGE 40-64: CPT | Performed by: FAMILY MEDICINE

## 2025-01-24 PROCEDURE — 99214 OFFICE O/P EST MOD 30 MIN: CPT | Performed by: FAMILY MEDICINE

## 2025-01-24 RX ORDER — TIRZEPATIDE 5 MG/.5ML
5 INJECTION, SOLUTION SUBCUTANEOUS WEEKLY
Qty: 2 ML | Refills: 4 | Status: SHIPPED | OUTPATIENT
Start: 2025-01-24

## 2025-01-24 RX ORDER — TOBRAMYCIN 3 MG/ML
1 SOLUTION/ DROPS OPHTHALMIC 4 TIMES DAILY
Qty: 5 ML | Refills: 1 | Status: SHIPPED | OUTPATIENT
Start: 2025-01-24

## 2025-01-24 RX ORDER — PIMECROLIMUS 10 MG/G
CREAM TOPICAL 2 TIMES DAILY
Qty: 30 G | Refills: 3 | Status: SHIPPED | OUTPATIENT
Start: 2025-01-24

## 2025-01-24 RX ORDER — TIRZEPATIDE 5 MG/.5ML
5 INJECTION, SOLUTION SUBCUTANEOUS WEEKLY
Qty: 2 ML | Refills: 0 | Status: SHIPPED | OUTPATIENT
Start: 2025-01-24 | End: 2025-01-24

## 2025-01-24 NOTE — PROGRESS NOTES
Adult Annual Physical  Name: Veronica Herrera      : 1965      MRN: [id number]  Encounter Provider: Julia Loza DO  Encounter Date: 2025   Encounter department: [facility]    Assessment & Plan  Annual physical exam         Blepharitis of left upper eyelid, unspecified type    Orders:    pimecrolimus (ELIDEL) 1 % cream; Apply topically 2 (two) times a day    tobramycin (TOBREX) 0.3 % SOLN; Administer 1 drop into the left eye 4 (four) times a day    Hypothyroidism, unspecified type         Increased BMI    Orders:    Tirzepatide-Weight Management (Zepbound) 5 MG/0.5ML SOLN; Inject 5 mg under the skin once a week    PSVT (paroxysmal supraventricular tachycardia) (HCC)  Continue with diltiazem therapy       Immunizations and preventive care screenings were discussed with patient today. Appropriate education was printed on patient's after visit summary.    Counseling:  Exercise: the importance of regular exercise/physical activity was discussed. Recommend exercise 3-5 times per week for at least 30 minutes.          History of Present Illness     Adult Annual Physical:  Patient presents for annual physical. The patient presents for an annual physical.  She continues with blepharitis of the left upper eyelid despite using triple antibiotic ointment which worsened it.  She has a little irritation and tearing of her eye but no discolored drainage from it.  The bottom eyelid has some small stye leg formation of the lower lid.  Her hypothyroidism is well-controlled, she is interested in going on meds to help lose weight at this time and we discussed her options and the patient would like to continue with Zepbound therapy, and she has not had any PSVT since being put on cardiac meds..     Diet and Physical Activity:  - Diet/Nutrition: well balanced diet.  - Exercise: moderate cardiovascular exercise.    Depression Screening:  - PHQ-2 Score: 0    General Health:  - Sleep:  sleeps well.  - Hearing: normal hearing bilateral ears.  - Vision: no vision problems.  - Dental: regular dental visits.    /GYN Health:  - Follows with GYN: no.   - Menopause: postmenopausal.   - History of STDs: no  - Contraception: menopause.      Advanced Care Planning:  - Has an advanced directive?: no    - Has a durable medical POA?: no    - ACP document given to patient?: yes      Review of Systems   Constitutional:  Negative for appetite change, chills and fever.   HENT:  Negative for ear pain, facial swelling, rhinorrhea, sinus pain, sore throat and trouble swallowing.         Irritation of the left upper eyelid with redness and scaling and small(2 styes) on the left lower eyelid area with watering of the eye   Eyes:  Negative for discharge and redness.   Respiratory:  Negative for chest tightness, shortness of breath and wheezing.    Cardiovascular:  Negative for chest pain and palpitations.   Gastrointestinal:  Negative for abdominal pain, diarrhea, nausea and vomiting.   Endocrine: Negative for polyuria.   Genitourinary:  Negative for dysuria and urgency.   Musculoskeletal:  Negative for arthralgias and back pain.   Skin:  Negative for rash.   Neurological:  Negative for dizziness, weakness and headaches.   Hematological:  Negative for adenopathy.   Psychiatric/Behavioral:  Negative for behavioral problems, confusion and sleep disturbance.    All other systems reviewed and are negative.    Pertinent Medical History   Hypothyroidism, hypertension and hypercholesterolemia      Objective   LMP 02/28/2019 (Exact Date)     Physical Exam  Vitals and nursing note reviewed.   Constitutional:       General: She is not in acute distress.     Appearance: Normal appearance. She is well-developed. She is not ill-appearing or diaphoretic.   HENT:      Head: Normocephalic and atraumatic.      Right Ear: Tympanic membrane, ear canal and external ear normal.      Left Ear: Tympanic membrane, ear canal and external ear  normal.      Nose: Nose normal. No congestion or rhinorrhea.      Mouth/Throat:      Mouth: Mucous membranes are moist.      Pharynx: Oropharynx is clear. No oropharyngeal exudate or posterior oropharyngeal erythema.   Eyes:      General: No scleral icterus.        Right eye: No discharge.         Left eye: No discharge.      Extraocular Movements: Extraocular movements intact.      Conjunctiva/sclera: Conjunctivae normal.      Pupils: Pupils are equal, round, and reactive to light.      Comments: Positive left upper eyelid erythematous and scaling with 2 small styes on lower tarsal edge of the eyelid and clear watering of the left eye without erythema of the conjunctiva   Neck:      Thyroid: No thyromegaly.      Vascular: No carotid bruit or JVD.      Trachea: No tracheal deviation.   Cardiovascular:      Rate and Rhythm: Normal rate and regular rhythm.      Pulses: Normal pulses.      Heart sounds: Normal heart sounds. No murmur heard.  Pulmonary:      Effort: Pulmonary effort is normal. No respiratory distress.      Breath sounds: Normal breath sounds. No stridor. No wheezing, rhonchi or rales.   Abdominal:      General: Abdomen is flat. Bowel sounds are normal. There is no distension.      Palpations: Abdomen is soft. There is no mass.      Tenderness: There is no abdominal tenderness. There is no guarding or rebound.   Musculoskeletal:         General: No swelling, tenderness or deformity. Normal range of motion.      Cervical back: Normal range of motion and neck supple. No rigidity.      Right lower leg: No edema.      Left lower leg: No edema.   Lymphadenopathy:      Cervical: No cervical adenopathy.   Skin:     General: Skin is warm and dry.      Capillary Refill: Capillary refill takes less than 2 seconds.      Coloration: Skin is not jaundiced.      Findings: No bruising, erythema or rash.   Neurological:      General: No focal deficit present.      Mental Status: She is alert and oriented to person,  place, and time.      Cranial Nerves: No cranial nerve deficit.      Sensory: No sensory deficit.      Motor: No abnormal muscle tone.      Coordination: Coordination normal.      Gait: Gait normal.      Deep Tendon Reflexes: Reflexes are normal and symmetric. Reflexes normal.   Psychiatric:         Mood and Affect: Mood normal.         Behavior: Behavior normal.         Thought Content: Thought content normal.         Judgment: Judgment normal.       Administrative Statements   I have spent a total time of 20 minutes in caring for this patient on the day of the visit/encounter including Diagnostic results, Prognosis, Risks and benefits of tx options, Instructions for management, and Patient and family education. Topics discussed with the patient / family include symptom assessment and management, medication review, medication adjustment, psychosocial support, and goals of care.

## 2025-01-28 ENCOUNTER — TELEPHONE (OUTPATIENT)
Age: 60
End: 2025-01-28

## 2025-01-28 NOTE — TELEPHONE ENCOUNTER
PA for pimecrolimus 1% cream SUBMITTED to [employer]    via    []CMM-KEY:   [x]Surescripts-Case ID # [id number]   []Availity-Auth ID # NDC #   []Faxed to plan   []Other website   []Phone call Case ID #     [x]PA sent as URGENT    All office notes, labs and other pertaining documents and studies sent. Clinical questions answered. Awaiting determination from insurance company.     Turnaround time for your insurance to make a decision on your Prior Authorization can take 7-21 business days.

## 2025-02-05 ENCOUNTER — TELEPHONE (OUTPATIENT)
Age: 60
End: 2025-02-05

## 2025-02-05 DIAGNOSIS — Z12.31 ENCOUNTER FOR SCREENING MAMMOGRAM FOR MALIGNANT NEOPLASM OF BREAST: Primary | ICD-10-CM

## 2025-02-05 NOTE — TELEPHONE ENCOUNTER
Patient called she rescheduled her annual from 2/6 due to weather .     is booking for August.      Patient is aware she due for mammogram in June.        Mammogram script needed

## 2025-02-10 DIAGNOSIS — J30.89 PERENNIAL ALLERGIC RHINITIS: ICD-10-CM

## 2025-02-11 RX ORDER — MONTELUKAST SODIUM 10 MG/1
10 TABLET ORAL
Qty: 90 TABLET | Refills: 1 | Status: SHIPPED | OUTPATIENT
Start: 2025-02-11

## 2025-02-17 ENCOUNTER — ANNUAL EXAM (OUTPATIENT)
Dept: OBGYN CLINIC | Facility: CLINIC | Age: 60
End: 2025-02-17
Payer: COMMERCIAL

## 2025-02-17 VITALS
BODY MASS INDEX: 34.19 KG/M2 | HEIGHT: 65 IN | SYSTOLIC BLOOD PRESSURE: 124 MMHG | WEIGHT: 205.2 LBS | DIASTOLIC BLOOD PRESSURE: 80 MMHG

## 2025-02-17 DIAGNOSIS — Z01.419 ENCOUNTER FOR ANNUAL ROUTINE GYNECOLOGICAL EXAMINATION: Primary | ICD-10-CM

## 2025-02-17 DIAGNOSIS — Z80.3 FAMILY HISTORY OF BREAST CANCER IN FIRST DEGREE RELATIVE: ICD-10-CM

## 2025-02-17 DIAGNOSIS — Z90.721 HISTORY OF RIGHT OOPHORECTOMY: ICD-10-CM

## 2025-02-17 DIAGNOSIS — Z91.89 AT HIGH RISK FOR BREAST CANCER: ICD-10-CM

## 2025-02-17 DIAGNOSIS — Z12.31 ENCOUNTER FOR SCREENING MAMMOGRAM FOR MALIGNANT NEOPLASM OF BREAST: ICD-10-CM

## 2025-02-17 DIAGNOSIS — Z98.890 HISTORY OF ENDOMETRIAL ABLATION: ICD-10-CM

## 2025-02-17 PROCEDURE — S0612 ANNUAL GYNECOLOGICAL EXAMINA: HCPCS | Performed by: OBSTETRICS & GYNECOLOGY

## 2025-02-17 PROCEDURE — G0145 SCR C/V CYTO,THINLAYER,RESCR: HCPCS | Performed by: OBSTETRICS & GYNECOLOGY

## 2025-02-17 RX ORDER — TACROLIMUS 1 MG/G
OINTMENT TOPICAL
COMMUNITY
Start: 2025-01-28

## 2025-02-17 NOTE — PROGRESS NOTES
Name: Veronica Herrera      : 1965      MRN: [id number]  Encounter Provider: Hafsa Rose DO  Encounter Date: 2025   Encounter department: [facility]  :  Assessment & Plan  Encounter for annual routine gynecological examination         Encounter for screening mammogram for malignant neoplasm of breast    Orders:    Mammo screening bilateral w 3d and cad; Future    History of endometrial ablation         History of right oophorectomy         At high risk for breast cancer    Orders:    US breast screening bilateral complete (ABUS); Future    Family history of breast cancer in first degree relative    Orders:    US breast screening bilateral complete (ABUS); Future    Pap smear done for cytology, reflex HPV.  Encouraged self breast examination as well as calcium supplementation.  Continue annual mammogram.  Reviewed automated breast ultrasound given high risk of breast cancer with family history.  She will check to see if this is covered by her insurance.  Reviewed colon cancer screening, up to date.  Also reviewed molecular helix screening, she may be interested in this, reviewed process.  All questions answered.  She will continue to follow-up with primary care as scheduled.  Return to office in 1 year or as needed    History of Present Illness   HPI  Veronica Herrera is a 59 y.o. female who presents     This is a pleasant 59-year-old female P3 ( x 3, age 32, 30, 29) presents for her GYN exam.  She went through menopause at age 54.  Infrequent hot flashes, tolerable she has never been on hormone replacement therapy.  She denies any vaginal bleeding or spotting.  No changes in bowel or bladder function.  She has been in a monogamous relationship with her .  Pap smears have been normal.    Underwent bilateral inguinal hernia repair 2024 with incidental umbilical hernia repair      Colon  3/3023 f/u 7 ys    Mammo 2024    Pap    Dexa 2023 osteopenia on evista, managed  through PCP    2012, right oophorectomy/excision of endometriosis, robotic assisted     history of endometrial ablation 2009    History obtained from: patient    Review of Systems   Constitutional:  Negative for fatigue, fever and unexpected weight change.   Respiratory:  Negative for cough, chest tightness, shortness of breath and wheezing.    Cardiovascular: Negative.  Negative for chest pain and palpitations.   Gastrointestinal: Negative.  Negative for abdominal distention, abdominal pain, blood in stool, constipation, diarrhea, nausea and vomiting.   Genitourinary: Negative.  Negative for difficulty urinating, dyspareunia, dysuria, flank pain, frequency, genital sores, hematuria, pelvic pain, urgency, vaginal bleeding, vaginal discharge and vaginal pain.   Skin:  Negative for rash.     Current Outpatient Medications on File Prior to Visit   Medication Sig Dispense Refill    Calcium Carb-Cholecalciferol 1000-800 MG-UNIT TABS Take 1 tablet by mouth daily      cetirizine (ZyrTEC) 10 mg tablet Take 10 mg by mouth daily      diltiazem (CARDIZEM CD) 180 mg 24 hr capsule Take 1 capsule (180 mg total) by mouth daily 90 capsule 3    hyoscyamine (ANASPAZ,LEVSIN) 0.125 MG tablet Take 1 tablet (0.125 mg total) by mouth every 4 (four) hours as needed for cramping 30 tablet 0    levothyroxine (Euthyrox) 25 mcg tablet Take 1 tablet (25 mcg total) by mouth daily in the early morning 90 tablet 3    Magnesium 100 MG CAPS Take by mouth in the morning      Misc Natural Products (OSTEO BI-FLEX/5-LOXIN ADVANCED PO) Take by mouth in the morning      montelukast (SINGULAIR) 10 mg tablet TAKE 1 TABLET DAILY AT BEDTIME 90 tablet 1    Omega-3 Fatty Acids (FISH OIL) 645 MG CAPS Take 1 capsule by mouth 2 (two) times a day       pimecrolimus (ELIDEL) 1 % cream Apply topically 2 (two) times a day 30 g 3    raloxifene (EVISTA) 60 mg tablet TAKE 1 TABLET DAILY 90 tablet 3    rosuvastatin (CRESTOR) 5 mg tablet Take 1 tablet (5 mg total) by mouth  "daily 90 tablet 3    tacrolimus (PROTOPIC) 0.1 % ointment Apply twice A DAY TO eyelid AS needed      Tirzepatide-Weight Management (Zepbound) 5 MG/0.5ML SOLN Inject 5 mg under the skin once a week 2 mL 4    tobramycin (TOBREX) 0.3 % SOLN Administer 1 drop into the left eye 4 (four) times a day 5 mL 1    valACYclovir (VALTREX) 1,000 mg tablet Take 1 tablet by mouth 3 (three) times a day as needed       No current facility-administered medications on file prior to visit.         Objective   /80   Ht 5' 5\" (1.651 m)   Wt 93.1 kg (205 lb 3.2 oz)   LMP 02/28/2019 (Exact Date)   BMI 34.15 kg/m²      Physical Exam  Constitutional:       Appearance: Normal appearance. She is well-developed.   HENT:      Head: Normocephalic and atraumatic.   Cardiovascular:      Rate and Rhythm: Normal rate and regular rhythm.   Pulmonary:      Effort: Pulmonary effort is normal.      Breath sounds: Normal breath sounds.   Chest:   Breasts:     Right: No inverted nipple, mass, nipple discharge, skin change or tenderness.      Left: No inverted nipple, mass, nipple discharge, skin change or tenderness.   Abdominal:      General: Bowel sounds are normal. There is no distension.      Palpations: Abdomen is soft.      Tenderness: There is no abdominal tenderness. There is no guarding or rebound.   Genitourinary:     Labia:         Right: No rash, tenderness or lesion.         Left: No rash, tenderness or lesion.       Vagina: Normal. No signs of injury. No vaginal discharge or tenderness.      Cervix: No cervical motion tenderness, discharge, friability, lesion or cervical bleeding.      Uterus: Not enlarged and not tender.       Adnexa:         Right: No mass, tenderness or fullness.          Left: No mass, tenderness or fullness.     Neurological:      Mental Status: She is alert.   Psychiatric:         Behavior: Behavior normal.         Administrative Statements   I have spent a total time of 25 minutes in caring for this patient on " the day of the visit/encounter including Diagnostic results, Instructions for management, Impressions, Counseling / Coordination of care, Documenting in the medical record, Reviewing/placing orders in the medical record (including tests, medications, and/or procedures), and Obtaining or reviewing history  .

## 2025-02-19 DIAGNOSIS — Z00.6 ENCOUNTER FOR EXAMINATION FOR NORMAL COMPARISON OR CONTROL IN CLINICAL RESEARCH PROGRAM: ICD-10-CM

## 2025-02-20 ENCOUNTER — APPOINTMENT (OUTPATIENT)
Dept: LAB | Facility: CLINIC | Age: 60
End: 2025-02-20
Payer: COMMERCIAL

## 2025-02-20 DIAGNOSIS — Z00.6 ENCOUNTER FOR EXAMINATION FOR NORMAL COMPARISON OR CONTROL IN CLINICAL RESEARCH PROGRAM: ICD-10-CM

## 2025-02-20 PROCEDURE — 36415 COLL VENOUS BLD VENIPUNCTURE: CPT

## 2025-02-21 LAB
LAB AP GYN PRIMARY INTERPRETATION: NORMAL
Lab: NORMAL

## 2025-03-02 LAB
APOB+LDLR+PCSK9 GENE MUT ANL BLD/T: NOT DETECTED
BRCA1+BRCA2 DEL+DUP + FULL MUT ANL BLD/T: NOT DETECTED
MLH1+MSH2+MSH6+PMS2 GN DEL+DUP+FUL M: NOT DETECTED

## 2025-03-12 DIAGNOSIS — E03.9 HYPOTHYROIDISM, UNSPECIFIED TYPE: ICD-10-CM

## 2025-03-12 RX ORDER — LEVOTHYROXINE SODIUM 25 UG/1
TABLET ORAL
Qty: 90 TABLET | Refills: 1 | Status: SHIPPED | OUTPATIENT
Start: 2025-03-12

## 2025-05-08 DIAGNOSIS — R63.8 INCREASED BMI: ICD-10-CM

## 2025-05-09 DIAGNOSIS — R63.8 INCREASED BMI: Primary | ICD-10-CM

## 2025-05-09 RX ORDER — TIRZEPATIDE 5 MG/.5ML
INJECTION, SOLUTION SUBCUTANEOUS
Qty: 2 ML | Refills: 4 | OUTPATIENT
Start: 2025-05-09

## 2025-05-09 RX ORDER — TIRZEPATIDE 7.5 MG/.5ML
7.5 INJECTION, SOLUTION SUBCUTANEOUS WEEKLY
Qty: 2 ML | Refills: 0 | Status: SHIPPED | OUTPATIENT
Start: 2025-05-09

## 2025-06-04 DIAGNOSIS — R63.8 INCREASED BMI: ICD-10-CM

## 2025-06-04 NOTE — TELEPHONE ENCOUNTER
PHARMACY CHANGE  Patient states she never picked up the script sent to [facility] on 5/9/25, states she needs the med refill to go to [facility]    Reason for call:   [x] Refill   [] Prior Auth  [] Other:     Office:   [x] PCP/Provider - [facility]  [] Specialty/Provider -     Medication:   Zepbound 7.5 mg      Pharmacy:   [facility]    Local Pharmacy   Does the patient have enough for 3 days?   [] Yes   [] No - Send as HP to POD    Mail Away Pharmacy   Does the patient have enough for 10 days?   [] Yes   [] No - Send as HP to POD     No

## 2025-06-05 DIAGNOSIS — R63.8 INCREASED BMI: ICD-10-CM

## 2025-06-05 RX ORDER — TIRZEPATIDE 7.5 MG/.5ML
7.5 INJECTION, SOLUTION SUBCUTANEOUS WEEKLY
Qty: 2 ML | Refills: 0 | Status: SHIPPED | OUTPATIENT
Start: 2025-06-05

## 2025-06-05 RX ORDER — TIRZEPATIDE 5 MG/.5ML
5 INJECTION, SOLUTION SUBCUTANEOUS WEEKLY
Qty: 2 ML | Refills: 4 | OUTPATIENT
Start: 2025-06-05

## 2025-07-02 ENCOUNTER — TELEPHONE (OUTPATIENT)
Dept: FAMILY MEDICINE CLINIC | Facility: CLINIC | Age: 60
End: 2025-07-02

## 2025-07-02 DIAGNOSIS — R63.8 INCREASED BMI: ICD-10-CM

## 2025-07-02 RX ORDER — TIRZEPATIDE 7.5 MG/.5ML
7.5 INJECTION, SOLUTION SUBCUTANEOUS WEEKLY
Qty: 2 ML | Refills: 0 | Status: SHIPPED | OUTPATIENT
Start: 2025-07-02

## 2025-07-02 NOTE — TELEPHONE ENCOUNTER
Patient called the RX Refill Line. Message is being forwarded to the office.     Patient called and states that [facility] reached out to the doctor to get more information on Zepbound 7.5 mg. Scanned in media. States that she has not heard anything and requested this in the beginning of June    Please contact patient at [phone] to let her know.

## 2025-07-11 DIAGNOSIS — I47.10 PSVT (PAROXYSMAL SUPRAVENTRICULAR TACHYCARDIA) (HCC): ICD-10-CM

## 2025-07-11 DIAGNOSIS — E78.5 DYSLIPIDEMIA: ICD-10-CM

## 2025-07-11 RX ORDER — ROSUVASTATIN CALCIUM 5 MG/1
5 TABLET, COATED ORAL DAILY
Qty: 90 TABLET | Refills: 3 | Status: SHIPPED | OUTPATIENT
Start: 2025-07-11

## 2025-07-11 RX ORDER — DILTIAZEM HYDROCHLORIDE 180 MG/1
180 CAPSULE, COATED, EXTENDED RELEASE ORAL DAILY
Qty: 90 CAPSULE | Refills: 3 | Status: SHIPPED | OUTPATIENT
Start: 2025-07-11

## 2025-08-19 ENCOUNTER — APPOINTMENT (OUTPATIENT)
Dept: LAB | Facility: AMBULARY SURGERY CENTER | Age: 60
End: 2025-08-19
Payer: COMMERCIAL

## 2025-08-19 LAB
ALBUMIN SERPL BCG-MCNC: 4 G/DL (ref 3.5–5)
ALP SERPL-CCNC: 68 U/L (ref 34–104)
ALT SERPL W P-5'-P-CCNC: 15 U/L (ref 7–52)
ANION GAP SERPL CALCULATED.3IONS-SCNC: 8 MMOL/L (ref 4–13)
AST SERPL W P-5'-P-CCNC: 18 U/L (ref 13–39)
BILIRUB SERPL-MCNC: 0.32 MG/DL (ref 0.2–1)
BUN SERPL-MCNC: 18 MG/DL (ref 5–25)
CALCIUM SERPL-MCNC: 8.8 MG/DL (ref 8.4–10.2)
CHLORIDE SERPL-SCNC: 109 MMOL/L (ref 96–108)
CHOLEST SERPL-MCNC: 158 MG/DL (ref ?–200)
CO2 SERPL-SCNC: 26 MMOL/L (ref 21–32)
CREAT SERPL-MCNC: 1.02 MG/DL (ref 0.6–1.3)
GFR SERPL CREATININE-BSD FRML MDRD: 60 ML/MIN/1.73SQ M
GLUCOSE P FAST SERPL-MCNC: 87 MG/DL (ref 65–99)
HDLC SERPL-MCNC: 59 MG/DL
LDLC SERPL CALC-MCNC: 87 MG/DL (ref 0–100)
POTASSIUM SERPL-SCNC: 4.1 MMOL/L (ref 3.5–5.3)
PROT SERPL-MCNC: 6.2 G/DL (ref 6.4–8.4)
SODIUM SERPL-SCNC: 143 MMOL/L (ref 135–147)
TRIGL SERPL-MCNC: 62 MG/DL (ref ?–150)

## 2025-08-20 ENCOUNTER — OFFICE VISIT (OUTPATIENT)
Dept: CARDIOLOGY CLINIC | Facility: CLINIC | Age: 60
End: 2025-08-20
Payer: COMMERCIAL

## 2025-08-20 VITALS
HEART RATE: 66 BPM | SYSTOLIC BLOOD PRESSURE: 110 MMHG | WEIGHT: 198.4 LBS | OXYGEN SATURATION: 98 % | DIASTOLIC BLOOD PRESSURE: 70 MMHG | BODY MASS INDEX: 33.02 KG/M2

## 2025-08-20 DIAGNOSIS — I47.10 PSVT (PAROXYSMAL SUPRAVENTRICULAR TACHYCARDIA) (HCC): Primary | ICD-10-CM

## 2025-08-20 DIAGNOSIS — E78.5 DYSLIPIDEMIA: ICD-10-CM

## 2025-08-20 PROCEDURE — 99214 OFFICE O/P EST MOD 30 MIN: CPT | Performed by: INTERNAL MEDICINE

## (undated) DEVICE — CANNULA SEAL

## (undated) DEVICE — GLOVE SRG BIOGEL ORTHOPEDIC 8

## (undated) DEVICE — HEAVY DUTY TABLE COVER: Brand: CONVERTORS

## (undated) DEVICE — ASTOUND STANDARD SURGICAL GOWN, XL: Brand: CONVERTORS

## (undated) DEVICE — NEEDLE 23G X 1 1/2 SAFETY-GLIDE THIN WALL

## (undated) DEVICE — SUT VICRYL 0 UR-6 27 IN J603H

## (undated) DEVICE — SUT STRATAFIX SPIRAL PDS PLUS 2-0 CT-2 23CM SXPP1B432

## (undated) DEVICE — VISUALIZATION SYSTEM: Brand: CLEARIFY

## (undated) DEVICE — TIP COVER ACCESSORY

## (undated) DEVICE — DECANTER: Brand: UNBRANDED

## (undated) DEVICE — MONOPOLAR CURVED SCISSORS: Brand: ENDOWRIST

## (undated) DEVICE — COLUMN DRAPE

## (undated) DEVICE — SUT VICRYL 2-0 SH 27 IN UNDYED J417H

## (undated) DEVICE — PROGRASP FORCEPS: Brand: ENDOWRIST

## (undated) DEVICE — DRAPE SHEET THREE QUARTER

## (undated) DEVICE — PACK PBDS LAP CHOLE RF

## (undated) DEVICE — MAYO STAND COVER: Brand: CONVERTORS

## (undated) DEVICE — ARM DRAPE

## (undated) DEVICE — EXOFIN PRECISION PEN HIGH VISCOSITY TOPICAL SKIN ADHESIVE: Brand: EXOFIN PRECISION PEN, 1G

## (undated) DEVICE — INTENDED FOR TISSUE SEPARATION, AND OTHER PROCEDURES THAT REQUIRE A SHARP SURGICAL BLADE TO PUNCTURE OR CUT.: Brand: BARD-PARKER SAFETY BLADES SIZE 11, STERILE

## (undated) DEVICE — SUT MONOCRYL 4-0 PS-2 27 IN Y426H

## (undated) DEVICE — SEAL

## (undated) DEVICE — MEGA SUTURECUT ND: Brand: ENDOWRIST